# Patient Record
Sex: MALE | Race: WHITE | NOT HISPANIC OR LATINO | Employment: FULL TIME | URBAN - METROPOLITAN AREA
[De-identification: names, ages, dates, MRNs, and addresses within clinical notes are randomized per-mention and may not be internally consistent; named-entity substitution may affect disease eponyms.]

---

## 2017-01-19 ENCOUNTER — OFFICE VISIT (OUTPATIENT)
Dept: SPORTS MEDICINE | Facility: CLINIC | Age: 57
End: 2017-01-19

## 2017-01-19 VITALS
RESPIRATION RATE: 16 BRPM | WEIGHT: 236.7 LBS | SYSTOLIC BLOOD PRESSURE: 124 MMHG | BODY MASS INDEX: 32.06 KG/M2 | HEART RATE: 80 BPM | HEIGHT: 72 IN | DIASTOLIC BLOOD PRESSURE: 80 MMHG | OXYGEN SATURATION: 95 %

## 2017-01-19 DIAGNOSIS — I82.431 DEEP VEIN THROMBOSIS (DVT) OF POPLITEAL VEIN OF RIGHT LOWER EXTREMITY, UNSPECIFIED CHRONICITY (HCC): ICD-10-CM

## 2017-01-19 DIAGNOSIS — R06.83 SNORING: ICD-10-CM

## 2017-01-19 DIAGNOSIS — R10.11 RUQ ABDOMINAL PAIN: Primary | ICD-10-CM

## 2017-01-19 PROCEDURE — 99213 OFFICE O/P EST LOW 20 MIN: CPT | Performed by: FAMILY MEDICINE

## 2017-01-19 NOTE — MR AVS SNAPSHOT
Ck Arreguin   2017 11:40 AM   Office Visit    Dept Phone:  [phone]   Encounter #:  [id number]    Provider:  Eduard Bee MD   Department:  [facility]                Your Full Care Plan              Today's Medication Changes          These changes are accurate as of: 17 12:07 PM.  If you have any questions, ask your nurse or doctor.               Stop taking medication(s)listed here:     rivaroxaban 20 MG tablet   Commonly known as:  XARELTO   Stopped by:  Eduard Bee MD                      Your Updated Medication List      Notice  As of 2017 12:07 PM    You have not been prescribed any medications.            You Were Diagnosed With        Codes Comments    RUQ abdominal pain    -  Primary ICD-10-CM: R10.11  ICD-9-CM: 789.01       Instructions     None    Patient Instructions History      Upcoming Appointments     Visit Type Date Time Department    OFFICE VISIT 2017 11:40 AM [facility]      Mobilligy Signup     [facility] Mob[employer] allows you to send messages to your doctor, view your test results, renew your prescriptions, schedule appointments, and more. To sign up, go to [employer] and click on the Sign Up Now link in the New User? box. Enter your Mobilligy Activation Code exactly as it appears below along with the last four digits of your Social Security Number and your Date of Birth () to complete the sign-up process. If you do not sign up before the expiration date, you must request a new code.    Mobilligy Activation Code: [email or web address]  Expires: 2017 12:07 PM    If you have questions, you can email [email or web address] or call [phone] to talk to our Mobillig[employer] staff. Remember, Mobilligy is NOT to be used for urgent needs. For medical emergencies, dial 911.               Other Info from Your Visit           Allergies     No Known Allergies       "  Reason for Visit     Follow-up follow up from DVT last year      Vital Signs     Blood Pressure Pulse Respirations Height Weight Oxygen Saturation    124/80 (BP Location: Right arm, Patient Position: Sitting, Cuff Size: Adult) 80 16 72\" (182.9 cm) 236 lb 11.2 oz (107 kg) 95%    Body Mass Index Smoking Status                32.1 kg/m2 Current Some Day Smoker          Problems and Diagnoses Noted     Abdominal pain, right upper quadrant    -  Primary        "

## 2017-01-19 NOTE — PROGRESS NOTES
"Subjective   Ck Arreguin is a 56 y.o. male.     History of Present Illness   1.  R popliteal vein DVT, finished 3 months of Xarelto, He was unable to get the repeat doppler done due to work schedule but he is having no pain or swelling.   2.  4 years ago after eating Mexican food and \"lots of chips\"  patient had fairly significant right upper quadrant pain.  Also an episode of emesis.  A few weeks ago [place] patient had similar circumstances and had right upper quadrant pain with emesis.  Also had noticed some abdominal bloating.  After a period of about 12 hours symptoms resolved and he is no longer had any other episodes.  No fever or chills.  No change in color of his urine or stool.  No melena or hematochezia.  3.  Urologist found \"a hernia\" on his last exam.  He was told to observe for now.  It sounds as if it were an indirect hernia.  4.  Wife has noted episodes of snoring and possible apnea.  Patient is in the process of losing weight.  No daytime somnolence.  No trouble with drowsiness during driving.    I have reviewed the patient's medical history in detail and updated the computerized patient record.        Review of Systems   Constitutional: Negative.    HENT: Negative.    Respiratory: Negative.         Per history of present illness    Cardiovascular: Negative.    Gastrointestinal:        Per history of present illness     Visit Vitals   • /80 (BP Location: Right arm, Patient Position: Sitting, Cuff Size: Adult)   • Pulse 80   • Resp 16   • Ht 72\" (182.9 cm)   • Wt 236 lb 11.2 oz (107 kg)   • SpO2 95%   • BMI 32.1 kg/m2         Objective   Physical Exam   Constitutional: He is oriented to person, place, and time. He appears well-developed and well-nourished.   HENT:   Head: Normocephalic and atraumatic.   Eyes: Conjunctivae and EOM are normal. Pupils are equal, round, and reactive to light.   Cardiovascular: Normal rate and regular rhythm.    No peripheral edema   Pulmonary/Chest: Effort " normal and breath sounds normal.   Abdominal: Soft. Bowel sounds are normal.   Musculoskeletal:   Right lower extremity normal in general appearance, no edema, no cords, negative Hever.   Neurological: He is alert and oriented to person, place, and time.   Skin: Skin is warm and dry.   Psychiatric: He has a normal mood and affect. His behavior is normal.   Vitals reviewed.      Assessment/Plan   Ck was seen today for follow-up.    Diagnoses and all orders for this visit:    RUQ abdominal pain  -     US gallbladder; Future    Snoring    Deep vein thrombosis (DVT) of popliteal vein of right lower extremity, unspecified chronicity      1.  Right upper quadrant pain, sounds that this could be gallstone related, we'll check ultrasound and if it is clear or if there is just one large stone that we can observe.  If there are multiple stones then would consider general surgery evaluation.  Patient given warning signs of cholecystitis and these occur he'll go straight to the emergency room.  2.  Right popliteal vein DVT, clinically resolved, at this point I don't think a repeat Doppler is necessary we'll just observe.  3.  Snoring with possible witnessed apnea, recommend sleep study but patient prefers to see if it improves with weight loss, if it does not then he'll let me know.

## 2017-01-30 ENCOUNTER — HOSPITAL ENCOUNTER (OUTPATIENT)
Dept: ULTRASOUND IMAGING | Facility: HOSPITAL | Age: 57
Discharge: HOME | End: 2017-01-30
Admitting: FAMILY MEDICINE

## 2017-01-30 DIAGNOSIS — R10.11 RUQ ABDOMINAL PAIN: ICD-10-CM

## 2017-01-30 PROCEDURE — 76705 ECHO EXAM OF ABDOMEN: CPT

## 2017-02-08 ENCOUNTER — TELEPHONE (OUTPATIENT)
Dept: SPORTS MEDICINE | Facility: CLINIC | Age: 57
End: 2017-02-08

## 2017-02-08 NOTE — TELEPHONE ENCOUNTER
----- Message from Anjana Cross sent at 2/1/2017  3:10 PM EST -----      ----- Message -----     From: Eduard Bee MD     Sent: 2/1/2017   9:37 AM       To: Anjana Cross    Gallbladder US is normal

## 2018-07-23 ENCOUNTER — APPOINTMENT (OUTPATIENT)
Dept: GENERAL RADIOLOGY | Facility: HOSPITAL | Age: 58
End: 2018-07-23

## 2018-07-23 ENCOUNTER — HOSPITAL ENCOUNTER (OUTPATIENT)
Dept: CARDIOLOGY | Facility: HOSPITAL | Age: 58
Discharge: HOME | End: 2018-07-23
Admitting: FAMILY MEDICINE

## 2018-07-23 DIAGNOSIS — Z86.718 HISTORY OF DVT (DEEP VEIN THROMBOSIS): ICD-10-CM

## 2018-07-23 DIAGNOSIS — M79.89 CALF SWELLING: Primary | ICD-10-CM

## 2018-07-23 LAB
BH CV LOW VAS RIGHT GASTRONEMIUS VESSEL: 1
BH CV LOWER VASCULAR LEFT COMMON FEMORAL AUGMENT: NORMAL
BH CV LOWER VASCULAR LEFT COMMON FEMORAL COMPETENT: NORMAL
BH CV LOWER VASCULAR LEFT COMMON FEMORAL COMPRESS: NORMAL
BH CV LOWER VASCULAR LEFT COMMON FEMORAL PHASIC: NORMAL
BH CV LOWER VASCULAR LEFT COMMON FEMORAL SPONT: NORMAL
BH CV LOWER VASCULAR RIGHT COMMON FEMORAL AUGMENT: NORMAL
BH CV LOWER VASCULAR RIGHT COMMON FEMORAL COMPETENT: NORMAL
BH CV LOWER VASCULAR RIGHT COMMON FEMORAL COMPRESS: NORMAL
BH CV LOWER VASCULAR RIGHT COMMON FEMORAL PHASIC: NORMAL
BH CV LOWER VASCULAR RIGHT COMMON FEMORAL SPONT: NORMAL
BH CV LOWER VASCULAR RIGHT DISTAL FEMORAL COMPRESS: NORMAL
BH CV LOWER VASCULAR RIGHT GASTRONEMIUS COMPRESS: NORMAL
BH CV LOWER VASCULAR RIGHT GASTRONEMIUS THROMBUS: NORMAL
BH CV LOWER VASCULAR RIGHT GREATER SAPH AK COMPRESS: NORMAL
BH CV LOWER VASCULAR RIGHT GREATER SAPH BK COMPRESS: NORMAL
BH CV LOWER VASCULAR RIGHT LESSER SAPH COMPRESS: NORMAL
BH CV LOWER VASCULAR RIGHT MID FEMORAL AUGMENT: NORMAL
BH CV LOWER VASCULAR RIGHT MID FEMORAL COMPETENT: NORMAL
BH CV LOWER VASCULAR RIGHT MID FEMORAL COMPRESS: NORMAL
BH CV LOWER VASCULAR RIGHT MID FEMORAL PHASIC: NORMAL
BH CV LOWER VASCULAR RIGHT MID FEMORAL SPONT: NORMAL
BH CV LOWER VASCULAR RIGHT PERONEAL COMPRESS: NORMAL
BH CV LOWER VASCULAR RIGHT POPLITEAL AUGMENT: NORMAL
BH CV LOWER VASCULAR RIGHT POPLITEAL COMPETENT: NORMAL
BH CV LOWER VASCULAR RIGHT POPLITEAL COMPRESS: NORMAL
BH CV LOWER VASCULAR RIGHT POPLITEAL PHASIC: NORMAL
BH CV LOWER VASCULAR RIGHT POPLITEAL SPONT: NORMAL
BH CV LOWER VASCULAR RIGHT POSTERIOR TIBIAL COMPRESS: NORMAL
BH CV LOWER VASCULAR RIGHT PROXIMAL FEMORAL COMPRESS: NORMAL
BH CV LOWER VASCULAR RIGHT SAPHENOFEMORAL JUNCTION AUGMENT: NORMAL
BH CV LOWER VASCULAR RIGHT SAPHENOFEMORAL JUNCTION COMPETENT: NORMAL
BH CV LOWER VASCULAR RIGHT SAPHENOFEMORAL JUNCTION COMPRESS: NORMAL
BH CV LOWER VASCULAR RIGHT SAPHENOFEMORAL JUNCTION PHASIC: NORMAL
BH CV LOWER VASCULAR RIGHT SAPHENOFEMORAL JUNCTION SPONT: NORMAL

## 2018-07-23 PROCEDURE — 73630 X-RAY EXAM OF FOOT: CPT | Performed by: GENERAL PRACTICE

## 2018-07-23 PROCEDURE — 93971 EXTREMITY STUDY: CPT

## 2018-07-23 NOTE — PROGRESS NOTES
RLE Venous doppler study complete. Preliminary positive for chronic calf DVT called to dr. Bee cell phone and message left with results. Patient released

## 2018-07-24 ENCOUNTER — TELEPHONE (OUTPATIENT)
Dept: SPORTS MEDICINE | Facility: CLINIC | Age: 58
End: 2018-07-24

## 2018-07-24 NOTE — TELEPHONE ENCOUNTER
PT NOTIFIED OF RESULTS.  ----- Message from Eduard Bee MD sent at 7/24/2018  8:20 AM EDT -----  The ultrasound shows no acute blood clots.

## 2018-09-26 ENCOUNTER — HOSPITAL ENCOUNTER (OUTPATIENT)
Dept: CARDIOLOGY | Facility: HOSPITAL | Age: 58
Discharge: HOME | End: 2018-09-26
Admitting: FAMILY MEDICINE

## 2018-09-26 ENCOUNTER — OFFICE VISIT (OUTPATIENT)
Dept: SPORTS MEDICINE | Facility: CLINIC | Age: 58
End: 2018-09-26

## 2018-09-26 VITALS
WEIGHT: 231 LBS | DIASTOLIC BLOOD PRESSURE: 60 MMHG | BODY MASS INDEX: 31.29 KG/M2 | HEIGHT: 72 IN | SYSTOLIC BLOOD PRESSURE: 114 MMHG

## 2018-09-26 DIAGNOSIS — M79.662 PAIN OF LEFT CALF: Primary | ICD-10-CM

## 2018-09-26 DIAGNOSIS — Z86.718 HISTORY OF DEEP VENOUS THROMBOSIS (DVT) OF DISTAL VEIN OF RIGHT LOWER EXTREMITY: ICD-10-CM

## 2018-09-26 DIAGNOSIS — Z86.718 HISTORY OF DVT (DEEP VEIN THROMBOSIS): Primary | ICD-10-CM

## 2018-09-26 DIAGNOSIS — M79.662 PAIN OF LEFT CALF: ICD-10-CM

## 2018-09-26 DIAGNOSIS — Z86.718 HISTORY OF DVT (DEEP VEIN THROMBOSIS): ICD-10-CM

## 2018-09-26 LAB
BH CV LOW VAS LEFT GASTRONEMIUS VESSEL: 1
BH CV LOW VAS LEFT PERONEAL VESSEL: 1
BH CV LOW VAS LEFT POSTERIOR TIBIAL VESSEL: 1
BH CV LOWER VASCULAR LEFT COMMON FEMORAL AUGMENT: NORMAL
BH CV LOWER VASCULAR LEFT COMMON FEMORAL COMPETENT: NORMAL
BH CV LOWER VASCULAR LEFT COMMON FEMORAL COMPRESS: NORMAL
BH CV LOWER VASCULAR LEFT COMMON FEMORAL PHASIC: NORMAL
BH CV LOWER VASCULAR LEFT COMMON FEMORAL SPONT: NORMAL
BH CV LOWER VASCULAR LEFT DISTAL FEMORAL COMPRESS: NORMAL
BH CV LOWER VASCULAR LEFT GASTRONEMIUS COMPRESS: NORMAL
BH CV LOWER VASCULAR LEFT GASTRONEMIUS THROMBUS: NORMAL
BH CV LOWER VASCULAR LEFT GREATER SAPH AK COMPRESS: NORMAL
BH CV LOWER VASCULAR LEFT GREATER SAPH BK COMPRESS: NORMAL
BH CV LOWER VASCULAR LEFT LESSER SAPH COMPRESS: NORMAL
BH CV LOWER VASCULAR LEFT MID FEMORAL AUGMENT: NORMAL
BH CV LOWER VASCULAR LEFT MID FEMORAL COMPETENT: NORMAL
BH CV LOWER VASCULAR LEFT MID FEMORAL COMPRESS: NORMAL
BH CV LOWER VASCULAR LEFT MID FEMORAL PHASIC: NORMAL
BH CV LOWER VASCULAR LEFT MID FEMORAL SPONT: NORMAL
BH CV LOWER VASCULAR LEFT PERONEAL COMPRESS: NORMAL
BH CV LOWER VASCULAR LEFT PERONEAL THROMBUS: NORMAL
BH CV LOWER VASCULAR LEFT POPLITEAL AUGMENT: NORMAL
BH CV LOWER VASCULAR LEFT POPLITEAL COMPETENT: NORMAL
BH CV LOWER VASCULAR LEFT POPLITEAL COMPRESS: NORMAL
BH CV LOWER VASCULAR LEFT POPLITEAL PHASIC: NORMAL
BH CV LOWER VASCULAR LEFT POPLITEAL SPONT: NORMAL
BH CV LOWER VASCULAR LEFT POSTERIOR TIBIAL COMPRESS: NORMAL
BH CV LOWER VASCULAR LEFT POSTERIOR TIBIAL THROMBUS: NORMAL
BH CV LOWER VASCULAR LEFT PROXIMAL FEMORAL COMPRESS: NORMAL
BH CV LOWER VASCULAR LEFT SAPHENOFEMORAL JUNCTION AUGMENT: NORMAL
BH CV LOWER VASCULAR LEFT SAPHENOFEMORAL JUNCTION COMPETENT: NORMAL
BH CV LOWER VASCULAR LEFT SAPHENOFEMORAL JUNCTION COMPRESS: NORMAL
BH CV LOWER VASCULAR LEFT SAPHENOFEMORAL JUNCTION PHASIC: NORMAL
BH CV LOWER VASCULAR LEFT SAPHENOFEMORAL JUNCTION SPONT: NORMAL
BH CV LOWER VASCULAR RIGHT COMMON FEMORAL AUGMENT: NORMAL
BH CV LOWER VASCULAR RIGHT COMMON FEMORAL COMPETENT: NORMAL
BH CV LOWER VASCULAR RIGHT COMMON FEMORAL COMPRESS: NORMAL
BH CV LOWER VASCULAR RIGHT COMMON FEMORAL PHASIC: NORMAL
BH CV LOWER VASCULAR RIGHT COMMON FEMORAL SPONT: NORMAL

## 2018-09-26 PROCEDURE — 99214 OFFICE O/P EST MOD 30 MIN: CPT | Performed by: FAMILY MEDICINE

## 2018-09-26 PROCEDURE — 93971 EXTREMITY STUDY: CPT

## 2018-09-26 RX ORDER — TADALAFIL 20 MG
TABLET ORAL
COMMUNITY
Start: 2018-06-28 | End: 2022-07-25 | Stop reason: SDUPTHER

## 2018-09-26 NOTE — PROGRESS NOTES
"Ck is a 57 y.o. year old male    Chief Complaint   Patient presents with   • Leg Pain     (L) calf // x 2 Weeks Ago        History of Present Illness   HPI   Patient with a history of popliteal vein DVT on the right in January 2017.  2 weeks ago patient began to note pain in the left calf, originally is centered in the lower calf.  He has also begun to note some edema around the ankles at the end of a workday.  Pain in the calf is a little worse with dorsiflexion of the ankle and walking.  No known trauma.  Patient has taken ibuprofen for this discomfort and has been helpful.  Patient leaves town tomorrow at noon for business trip.  No chest pain or shortness of breath.  No hemoptysis.  I have reviewed the patient's medical, family, and social history in detail and updated the computerized patient record.    Review of Systems   Constitutional: Negative for fever.   Cardiovascular: Positive for leg swelling.   Skin: Negative for wound.   Neurological: Negative for numbness.   All other systems reviewed and are negative.      /60   Ht 182.9 cm (72.01\")   Wt 105 kg (231 lb)   BMI 31.32 kg/m²      Physical Exam   Constitutional: He is oriented to person, place, and time. He appears well-developed and well-nourished.   HENT:   Head: Normocephalic and atraumatic.   Eyes: Pupils are equal, round, and reactive to light. Conjunctivae and EOM are normal.   Cardiovascular: Normal rate, regular rhythm and normal heart sounds.    Pulmonary/Chest: Effort normal.   Musculoskeletal:   Left calf with mild tenderness to palpation in the center of the lower calf.  Positive Hever.  No pain with resisted plantar flexion.  Patient does have trace of pitting edema lower extremity.   Neurological: He is alert and oriented to person, place, and time.   Skin: Skin is warm and dry.   Psychiatric: He has a normal mood and affect. His behavior is normal.   Vitals reviewed.       Diagnoses and all orders for this visit:    Pain of " left calf    History of deep venous thrombosis (DVT) of distal vein of right lower extremity    Other orders  -     CIALIS 20 MG tablet;        Patient has been set up for venous Doppler left lower extremity, I do not know what time it is as of yet because he has to get insurance approval was that process has been started.  Because of the timing and him leaving town, past history, physical findings I think it would be prudent to start on Xarelto, I have given him a starter pack, we can always discontinue this if the Doppler is negative and then at that point would treat with anti-inflammatories.  Also if this is proven to be a DVT then would refer to hematology for hypercoagulable workup secondary to what would be his second unprovoked DVT.      EMR Dragon/Transcription disclaimer:    Much of this encounter note is an electronic transcription/translation of spoken language to printed text.  The electronic translation of spoken language may permit erroneous, or at times, nonsensical words or phrases to be inadvertently transcribed.  Although I have reviewed the note for such errors some may still exist.

## 2018-09-27 ENCOUNTER — APPOINTMENT (OUTPATIENT)
Dept: CARDIOLOGY | Facility: HOSPITAL | Age: 58
End: 2018-09-27

## 2018-10-04 ENCOUNTER — TELEPHONE (OUTPATIENT)
Dept: SPORTS MEDICINE | Facility: CLINIC | Age: 58
End: 2018-10-04

## 2018-10-04 NOTE — TELEPHONE ENCOUNTER
Patient's wife called with a couple of questions in regards to patient's blood clot.    How often and how much to wear compression socks?    Have a mini-marathon on 10/20/2018 that will be 13 miles. Will be walking and not running. Would patient be able to participate with the blood clot?

## 2018-10-05 NOTE — TELEPHONE ENCOUNTER
I would sugest wear the compression socks during the day    He can participate in the mini-marathon 10/20/2018

## 2018-10-22 ENCOUNTER — OFFICE VISIT (OUTPATIENT)
Dept: SPORTS MEDICINE | Facility: CLINIC | Age: 58
End: 2018-10-22

## 2018-10-22 VITALS
DIASTOLIC BLOOD PRESSURE: 72 MMHG | SYSTOLIC BLOOD PRESSURE: 118 MMHG | TEMPERATURE: 97.4 F | HEART RATE: 91 BPM | WEIGHT: 228 LBS | OXYGEN SATURATION: 99 % | HEIGHT: 72 IN | BODY MASS INDEX: 30.88 KG/M2

## 2018-10-22 DIAGNOSIS — I82.409 RECURRENT DEEP VEIN THROMBOSIS (DVT) (HCC): Primary | ICD-10-CM

## 2018-10-22 PROCEDURE — 99213 OFFICE O/P EST LOW 20 MIN: CPT | Performed by: FAMILY MEDICINE

## 2018-10-22 NOTE — PROGRESS NOTES
"Ck is a 57 y.o. year old male    Chief Complaint   Patient presents with   • Follow-up     [place] [employer]       History of Present Illness   HPI   Patient was diagnosed with thrombus in the left posterior tib, peroneal, gastrocsoleus September 26, 2018.  Patient was started on Xarelto.  Even though this is not into the deep venous system patient has had 2 unprovoked thrombi.  No complaints with Xarelto, no spontaneous bleeding.  No chest pain or shortness of breath.  He has less edema and no pain in the left lower leg at this time.    I have reviewed the patient's medical, family, and social history in detail and updated the computerized patient record.    Review of Systems   Constitutional: Negative.    HENT: Negative.    Respiratory: Negative.    Cardiovascular: Negative.    Gastrointestinal: Negative.    Genitourinary: Negative.    Musculoskeletal: Negative.        /72 (BP Location: Right arm, Patient Position: Sitting, Cuff Size: Large Adult)   Pulse 91   Temp 97.4 °F (36.3 °C) (Oral)   Ht 182.9 cm (72.01\")   Wt 103 kg (228 lb)   SpO2 99%   BMI 30.92 kg/m²      Physical Exam   Constitutional: He is oriented to person, place, and time. He appears well-developed and well-nourished.   HENT:   Head: Normocephalic and atraumatic.   Eyes: Pupils are equal, round, and reactive to light. Conjunctivae and EOM are normal.   Cardiovascular:   No peripheral edema   Pulmonary/Chest: Effort normal.   Musculoskeletal:   Left lower extremity normal in appearance, no edema.  No pain with palpation.  Negative Hever.   Neurological: He is alert and oriented to person, place, and time.   Skin: Skin is warm and dry.   Psychiatric: He has a normal mood and affect. His behavior is normal.   Vitals reviewed.       Diagnoses and all orders for this visit:    Recurrent deep vein thrombosis (DVT) (CMS/[facility])  -     rivaroxaban (XARELTO) 20 MG tablet; Take 1 tablet by mouth Daily.  -     Ambulatory Referral to Hematology     "     Because this is his second episode of unprovoked thrombi, will refer to hematology for further evaluation.    EMR Dragon/Transcription disclaimer:    Much of this encounter note is an electronic transcription/translation of spoken language to printed text.  The electronic translation of spoken language may permit erroneous, or at times, nonsensical words or phrases to be inadvertently transcribed.  Although I have reviewed the note for such errors some may still exist.

## 2018-11-05 ENCOUNTER — LAB (OUTPATIENT)
Dept: OTHER | Facility: HOSPITAL | Age: 58
End: 2018-11-05

## 2018-11-05 ENCOUNTER — CONSULT (OUTPATIENT)
Dept: ONCOLOGY | Facility: CLINIC | Age: 58
End: 2018-11-05

## 2018-11-05 VITALS
DIASTOLIC BLOOD PRESSURE: 77 MMHG | SYSTOLIC BLOOD PRESSURE: 122 MMHG | BODY MASS INDEX: 31.37 KG/M2 | WEIGHT: 231.6 LBS | HEIGHT: 72 IN | RESPIRATION RATE: 16 BRPM | HEART RATE: 67 BPM | TEMPERATURE: 98.4 F | OXYGEN SATURATION: 97 %

## 2018-11-05 DIAGNOSIS — I82.431 DEEP VEIN THROMBOSIS (DVT) OF POPLITEAL VEIN OF RIGHT LOWER EXTREMITY, UNSPECIFIED CHRONICITY (HCC): Primary | ICD-10-CM

## 2018-11-05 LAB
BASOPHILS # BLD AUTO: 0.03 10*3/MM3 (ref 0–0.2)
BASOPHILS NFR BLD AUTO: 0.5 % (ref 0–1.5)
DEPRECATED RDW RBC AUTO: 39.7 FL (ref 37–54)
EOSINOPHIL # BLD AUTO: 0.07 10*3/MM3 (ref 0–0.7)
EOSINOPHIL NFR BLD AUTO: 1.1 % (ref 0.3–6.2)
ERYTHROCYTE [DISTWIDTH] IN BLOOD BY AUTOMATED COUNT: 12.4 % (ref 11.5–14.5)
F5 GENE MUT ANL BLD/T: ABNORMAL
FACTOR II, DNA ANALYSIS: NORMAL
HCT VFR BLD AUTO: 46.1 % (ref 40.4–52.2)
HGB BLD-MCNC: 15.4 G/DL (ref 13.7–17.6)
IMM GRANULOCYTES # BLD: 0.01 10*3/MM3 (ref 0–0.03)
IMM GRANULOCYTES NFR BLD: 0.2 % (ref 0–0.5)
LYMPHOCYTES # BLD AUTO: 1.81 10*3/MM3 (ref 0.9–4.8)
LYMPHOCYTES NFR BLD AUTO: 29.3 % (ref 19.6–45.3)
MCH RBC QN AUTO: 29.2 PG (ref 27–32.7)
MCHC RBC AUTO-ENTMCNC: 33.4 G/DL (ref 32.6–36.4)
MCV RBC AUTO: 87.3 FL (ref 79.8–96.2)
MONOCYTES # BLD AUTO: 0.68 10*3/MM3 (ref 0.2–1.2)
MONOCYTES NFR BLD AUTO: 11 % (ref 5–12)
NEUTROPHILS # BLD AUTO: 3.57 10*3/MM3 (ref 1.9–8.1)
NEUTROPHILS NFR BLD AUTO: 57.9 % (ref 42.7–76)
NRBC BLD MANUAL-RTO: 0 /100 WBC (ref 0–0)
PLATELET # BLD AUTO: 259 10*3/MM3 (ref 140–500)
PMV BLD AUTO: 10.2 FL (ref 6–12)
PSA SERPL-MCNC: <0.014 NG/ML (ref 0–4)
RBC # BLD AUTO: 5.28 10*6/MM3 (ref 4.6–6)
TESTOST SERPL-MCNC: 501.8 NG/DL (ref 193–740)
WBC NRBC COR # BLD: 6.17 10*3/MM3 (ref 4.5–10.7)

## 2018-11-05 PROCEDURE — 86146 BETA-2 GLYCOPROTEIN ANTIBODY: CPT | Performed by: INTERNAL MEDICINE

## 2018-11-05 PROCEDURE — 99244 OFF/OP CNSLTJ NEW/EST MOD 40: CPT | Performed by: INTERNAL MEDICINE

## 2018-11-05 PROCEDURE — 81240 F2 GENE: CPT | Performed by: INTERNAL MEDICINE

## 2018-11-05 PROCEDURE — 85300 ANTITHROMBIN III ACTIVITY: CPT | Performed by: INTERNAL MEDICINE

## 2018-11-05 PROCEDURE — 84403 ASSAY OF TOTAL TESTOSTERONE: CPT | Performed by: INTERNAL MEDICINE

## 2018-11-05 PROCEDURE — 86147 CARDIOLIPIN ANTIBODY EA IG: CPT | Performed by: INTERNAL MEDICINE

## 2018-11-05 PROCEDURE — 36415 COLL VENOUS BLD VENIPUNCTURE: CPT

## 2018-11-05 PROCEDURE — 84153 ASSAY OF PSA TOTAL: CPT | Performed by: INTERNAL MEDICINE

## 2018-11-05 PROCEDURE — 81241 F5 GENE: CPT | Performed by: INTERNAL MEDICINE

## 2018-11-05 PROCEDURE — 85303 CLOT INHIBIT PROT C ACTIVITY: CPT | Performed by: INTERNAL MEDICINE

## 2018-11-05 PROCEDURE — 85732 THROMBOPLASTIN TIME PARTIAL: CPT | Performed by: INTERNAL MEDICINE

## 2018-11-05 PROCEDURE — 85306 CLOT INHIBIT PROT S FREE: CPT | Performed by: INTERNAL MEDICINE

## 2018-11-05 PROCEDURE — 85613 RUSSELL VIPER VENOM DILUTED: CPT | Performed by: INTERNAL MEDICINE

## 2018-11-05 PROCEDURE — 85025 COMPLETE CBC W/AUTO DIFF WBC: CPT | Performed by: INTERNAL MEDICINE

## 2018-11-06 LAB
AT III PPP CHRO-ACNC: 119 % (ref 90–134)
CARDIOLIPIN IGG SER IA-ACNC: <9 GPL U/ML (ref 0–14)
CARDIOLIPIN IGM SER IA-ACNC: <9 MPL U/ML (ref 0–12)
PROT C ACT/NOR PPP: 112 % (ref 86–163)
PROT S ACT/NOR PPP: 78 % (ref 70–127)
PROT S FREE PPP-ACNC: 104 % (ref 49–138)

## 2018-11-07 LAB
LA NT PLATELET PPP: 31.6 SEC (ref 0–51.9)
LUPUS ANTICOAGULANT REFLEX: NORMAL
SCREEN DRVVT: 46.6 SEC (ref 0–47)

## 2018-11-08 LAB
B2 GLYCOPROT1 IGA SER-ACNC: <9 GPI IGA UNITS (ref 0–25)
B2 GLYCOPROT1 IGG SER-ACNC: <9 GPI IGG UNITS (ref 0–20)
B2 GLYCOPROT1 IGM SER-ACNC: <9 GPI IGM UNITS (ref 0–32)

## 2018-11-09 NOTE — PROGRESS NOTES
REASON FOR CONSULTATION:     Recurrent DVT                             REQUESTING PHYSICIAN: Eduard Bee,*     RECORDS OBTAINED:  Records of the patients history including those obtained from the referring provider were reviewed and summarized in detail.    HISTORY OF PRESENT ILLNESS:  The patient is a 57 y.o. year old male who is here for initial review with the above-mentioned history. His thrombotic history dates back to 2016 with an acute right lower extremity deep vein thrombosis noted in the popliteal and gastrocnemius/soleal found at that time. More recently, chronic right lower extremity deep vein thrombosis noted in the gastrocnemius/soleal was found while evaluating swelling.   He is seen today in evaluation of hypercoagulable state.   As he is seen today, he has not taken his Xarelto in a few days and therefore lupus anticoagulant testing is also performed.    Past Medical History:   Diagnosis Date   • Cancer (CMS/HCC)     Prostate   • DVT (deep venous thrombosis) (CMS/[facility]) 08/2016    RLE   • DVT (deep venous thrombosis) (CMS/[facility]) 09/2018    LLE   • History of snoring      Past Surgical History:   Procedure Laterality Date   • PROSTATE SURGERY         HEMATOLOGIC/ONCOLOGIC HISTORY:  (History from previous dates can be found in the separate document.)    MEDICATIONS    Current Outpatient Prescriptions:   •  CIALIS 20 MG tablet, , Disp: , Rfl:   •  rivaroxaban (XARELTO) 20 MG tablet, Take 1 tablet by mouth Daily., Disp: 30 tablet, Rfl: 11    ALLERGIES:   No Known Allergies    SOCIAL HISTORY:       Social History     Social History   • Marital status:      Spouse name: Xi   • Number of children: N/A   • Years of education: N/A     Occupational History   •  [employer]     Social History Main Topics   • Smoking status: Current Some Day Smoker     Types: Cigars   • Smokeless tobacco: Never Used   • Alcohol use Yes      Comment: Weekly    • Drug use: No   • Sexual activity: Defer  "    Other Topics Concern   • Not on file     Social History Narrative   • No narrative on file         FAMILY HISTORY:  Family History   Problem Relation Age of Onset   • No Known Problems Mother    • No Known Problems Father        REVIEW OF SYSTEMS:  Review of Systems           Vitals:    11/05/18 1146   BP: 122/77   Pulse: 67   Resp: 16   Temp: 98.4 °F (36.9 °C)   TempSrc: Oral   SpO2: 97%   Weight: 105 kg (231 lb 9.6 oz)   Height: 183 cm (72.05\")   PainSc: 0-No pain     Current Status 11/5/2018   ECOG score 0      PHYSICAL EXAM:      CONSTITUTIONAL:  Vital signs reviewed.  No distress, looks comfortable.  EYES:  Conjunctiva and lids unremarkable.  PERRLA  EARS,NOSE,MOUTH,THROAT:  Ears and nose appear unremarkable.  Lips, teeth, gums appear unremarkable.  RESPIRATORY:  Normal respiratory effort.  Lungs clear to auscultation bilaterally.  CARDIOVASCULAR:  Normal S1, S2.  No murmurs rubs or gallops.  No significant lower extremity edema.  GASTROINTESTINAL: Abdomen appears unremarkable.  Nontender.  No hepatomegaly.  No splenomegaly.  LYMPHATIC:  No cervical, supraclavicular, axillary lymphadenopathy.  MUSCULOSKELETAL:  Unremarkable gait and station.  Unremarkable digits/nails.  No cyanosis or clubbing.  SKIN:  Warm.  No rashes.  PSYCHIATRIC:  Normal judgment and insight.  Normal mood and affect.      RECENT LABS:        WBC   Date Value Ref Range Status   11/05/2018 6.17 4.50 - 10.70 10*3/mm3 Final   09/09/2016 5.97 4.50 - 10.70 10*3/mm3 Final   09/09/2016 0-5 0 - 5 /hpf Final     Hemoglobin   Date Value Ref Range Status   11/05/2018 15.4 13.7 - 17.6 g/dL Final   09/09/2016 14.5 13.7 - 17.6 g/dL Final     Platelets   Date Value Ref Range Status   11/05/2018 259 140 - 500 10*3/mm3 Final   09/09/2016 265 140 - 500 10*3/mm3 Final       Assessment/Plan   Deep vein thrombosis (DVT) of popliteal vein of right lower extremity, unspecified chronicity (CMS/HCC)  - Duplex Venous Lower Extremity - Bilateral CAR  - Lupus " Anticoagulant Reflex  - Beta-2 Glycoprotein Antibodies  - Antithrombin III  - Protein C Activity  - Protein S Functional  - Protein S Antigen, Free  - PSA DIAGNOSTIC  - Factor 5 Leiden  - Factor II, DNA Analysis  - Anticardiolipin Antibody, IgG / M, Qn  - Testosterone  We will perform thrombophilia testing and resume Xarelto. A repeat Doppler study will confirm his clinical sense of improvement.

## 2018-11-12 ENCOUNTER — HOSPITAL ENCOUNTER (OUTPATIENT)
Dept: CARDIOLOGY | Facility: HOSPITAL | Age: 58
Discharge: HOME | End: 2018-11-12
Attending: INTERNAL MEDICINE | Admitting: INTERNAL MEDICINE

## 2018-11-12 DIAGNOSIS — I82.431 DEEP VEIN THROMBOSIS (DVT) OF POPLITEAL VEIN OF RIGHT LOWER EXTREMITY, UNSPECIFIED CHRONICITY (HCC): ICD-10-CM

## 2018-11-12 LAB
BH CV LOW VAS LEFT GASTRONEMIUS VESSEL: 1
BH CV LOW VAS LEFT PERONEAL VESSEL: 1
BH CV LOW VAS LEFT POSTERIOR TIBIAL VESSEL: 1
BH CV LOW VAS RIGHT GASTRONEMIUS VESSEL: 1
BH CV LOWER VASCULAR LEFT COMMON FEMORAL AUGMENT: NORMAL
BH CV LOWER VASCULAR LEFT COMMON FEMORAL COMPETENT: NORMAL
BH CV LOWER VASCULAR LEFT COMMON FEMORAL COMPRESS: NORMAL
BH CV LOWER VASCULAR LEFT COMMON FEMORAL PHASIC: NORMAL
BH CV LOWER VASCULAR LEFT COMMON FEMORAL SPONT: NORMAL
BH CV LOWER VASCULAR LEFT DISTAL FEMORAL COMPRESS: NORMAL
BH CV LOWER VASCULAR LEFT GASTRONEMIUS COMPRESS: NORMAL
BH CV LOWER VASCULAR LEFT GASTRONEMIUS THROMBUS: NORMAL
BH CV LOWER VASCULAR LEFT GREATER SAPH AK COMPRESS: NORMAL
BH CV LOWER VASCULAR LEFT GREATER SAPH BK COMPRESS: NORMAL
BH CV LOWER VASCULAR LEFT LESSER SAPH COMPRESS: NORMAL
BH CV LOWER VASCULAR LEFT MID FEMORAL AUGMENT: NORMAL
BH CV LOWER VASCULAR LEFT MID FEMORAL COMPETENT: NORMAL
BH CV LOWER VASCULAR LEFT MID FEMORAL COMPRESS: NORMAL
BH CV LOWER VASCULAR LEFT MID FEMORAL PHASIC: NORMAL
BH CV LOWER VASCULAR LEFT MID FEMORAL SPONT: NORMAL
BH CV LOWER VASCULAR LEFT PERONEAL COMPRESS: NORMAL
BH CV LOWER VASCULAR LEFT PERONEAL THROMBUS: NORMAL
BH CV LOWER VASCULAR LEFT POPLITEAL AUGMENT: NORMAL
BH CV LOWER VASCULAR LEFT POPLITEAL COMPETENT: NORMAL
BH CV LOWER VASCULAR LEFT POPLITEAL COMPRESS: NORMAL
BH CV LOWER VASCULAR LEFT POPLITEAL PHASIC: NORMAL
BH CV LOWER VASCULAR LEFT POPLITEAL SPONT: NORMAL
BH CV LOWER VASCULAR LEFT POSTERIOR TIBIAL COMPRESS: NORMAL
BH CV LOWER VASCULAR LEFT POSTERIOR TIBIAL THROMBUS: NORMAL
BH CV LOWER VASCULAR LEFT PROXIMAL FEMORAL COMPRESS: NORMAL
BH CV LOWER VASCULAR LEFT SAPHENOFEMORAL JUNCTION AUGMENT: NORMAL
BH CV LOWER VASCULAR LEFT SAPHENOFEMORAL JUNCTION COMPETENT: NORMAL
BH CV LOWER VASCULAR LEFT SAPHENOFEMORAL JUNCTION COMPRESS: NORMAL
BH CV LOWER VASCULAR LEFT SAPHENOFEMORAL JUNCTION PHASIC: NORMAL
BH CV LOWER VASCULAR LEFT SAPHENOFEMORAL JUNCTION SPONT: NORMAL
BH CV LOWER VASCULAR RIGHT COMMON FEMORAL AUGMENT: NORMAL
BH CV LOWER VASCULAR RIGHT COMMON FEMORAL COMPETENT: NORMAL
BH CV LOWER VASCULAR RIGHT COMMON FEMORAL COMPRESS: NORMAL
BH CV LOWER VASCULAR RIGHT COMMON FEMORAL PHASIC: NORMAL
BH CV LOWER VASCULAR RIGHT COMMON FEMORAL SPONT: NORMAL
BH CV LOWER VASCULAR RIGHT DISTAL FEMORAL COMPRESS: NORMAL
BH CV LOWER VASCULAR RIGHT GASTRONEMIUS COMPRESS: NORMAL
BH CV LOWER VASCULAR RIGHT GASTRONEMIUS THROMBUS: NORMAL
BH CV LOWER VASCULAR RIGHT GREATER SAPH AK COMPRESS: NORMAL
BH CV LOWER VASCULAR RIGHT GREATER SAPH BK COMPRESS: NORMAL
BH CV LOWER VASCULAR RIGHT LESSER SAPH COMPRESS: NORMAL
BH CV LOWER VASCULAR RIGHT MID FEMORAL AUGMENT: NORMAL
BH CV LOWER VASCULAR RIGHT MID FEMORAL COMPETENT: NORMAL
BH CV LOWER VASCULAR RIGHT MID FEMORAL COMPRESS: NORMAL
BH CV LOWER VASCULAR RIGHT MID FEMORAL PHASIC: NORMAL
BH CV LOWER VASCULAR RIGHT MID FEMORAL SPONT: NORMAL
BH CV LOWER VASCULAR RIGHT PERONEAL COMPRESS: NORMAL
BH CV LOWER VASCULAR RIGHT POPLITEAL AUGMENT: NORMAL
BH CV LOWER VASCULAR RIGHT POPLITEAL COMPETENT: NORMAL
BH CV LOWER VASCULAR RIGHT POPLITEAL COMPRESS: NORMAL
BH CV LOWER VASCULAR RIGHT POPLITEAL PHASIC: NORMAL
BH CV LOWER VASCULAR RIGHT POPLITEAL SPONT: NORMAL
BH CV LOWER VASCULAR RIGHT POSTERIOR TIBIAL COMPRESS: NORMAL
BH CV LOWER VASCULAR RIGHT PROXIMAL FEMORAL COMPRESS: NORMAL
BH CV LOWER VASCULAR RIGHT SAPHENOFEMORAL JUNCTION AUGMENT: NORMAL
BH CV LOWER VASCULAR RIGHT SAPHENOFEMORAL JUNCTION COMPETENT: NORMAL
BH CV LOWER VASCULAR RIGHT SAPHENOFEMORAL JUNCTION COMPRESS: NORMAL
BH CV LOWER VASCULAR RIGHT SAPHENOFEMORAL JUNCTION PHASIC: NORMAL
BH CV LOWER VASCULAR RIGHT SAPHENOFEMORAL JUNCTION SPONT: NORMAL

## 2018-11-12 PROCEDURE — 93970 EXTREMITY STUDY: CPT

## 2018-11-26 ENCOUNTER — LAB (OUTPATIENT)
Dept: OTHER | Facility: HOSPITAL | Age: 58
End: 2018-11-26

## 2018-11-26 ENCOUNTER — OFFICE VISIT (OUTPATIENT)
Dept: ONCOLOGY | Facility: CLINIC | Age: 58
End: 2018-11-26

## 2018-11-26 VITALS
HEIGHT: 72 IN | DIASTOLIC BLOOD PRESSURE: 74 MMHG | WEIGHT: 233.8 LBS | TEMPERATURE: 98.2 F | OXYGEN SATURATION: 95 % | HEART RATE: 81 BPM | SYSTOLIC BLOOD PRESSURE: 115 MMHG | RESPIRATION RATE: 16 BRPM | BODY MASS INDEX: 31.67 KG/M2

## 2018-11-26 DIAGNOSIS — D68.51 FACTOR V LEIDEN MUTATION (HCC): Primary | ICD-10-CM

## 2018-11-26 DIAGNOSIS — I82.431 DEEP VEIN THROMBOSIS (DVT) OF POPLITEAL VEIN OF RIGHT LOWER EXTREMITY, UNSPECIFIED CHRONICITY (HCC): Primary | ICD-10-CM

## 2018-11-26 DIAGNOSIS — I82.431 DEEP VEIN THROMBOSIS (DVT) OF POPLITEAL VEIN OF RIGHT LOWER EXTREMITY, UNSPECIFIED CHRONICITY (HCC): ICD-10-CM

## 2018-11-26 LAB
BASOPHILS # BLD AUTO: 0.02 10*3/MM3 (ref 0–0.2)
BASOPHILS NFR BLD AUTO: 0.3 % (ref 0–1.5)
DEPRECATED RDW RBC AUTO: 39.8 FL (ref 37–54)
EOSINOPHIL # BLD AUTO: 0.14 10*3/MM3 (ref 0–0.7)
EOSINOPHIL NFR BLD AUTO: 1.9 % (ref 0.3–6.2)
ERYTHROCYTE [DISTWIDTH] IN BLOOD BY AUTOMATED COUNT: 12.4 % (ref 11.5–14.5)
HCT VFR BLD AUTO: 46.3 % (ref 40.4–52.2)
HGB BLD-MCNC: 15.5 G/DL (ref 13.7–17.6)
IMM GRANULOCYTES # BLD: 0.03 10*3/MM3 (ref 0–0.03)
IMM GRANULOCYTES NFR BLD: 0.4 % (ref 0–0.5)
LYMPHOCYTES # BLD AUTO: 1.99 10*3/MM3 (ref 0.9–4.8)
LYMPHOCYTES NFR BLD AUTO: 26.9 % (ref 19.6–45.3)
MCH RBC QN AUTO: 29.2 PG (ref 27–32.7)
MCHC RBC AUTO-ENTMCNC: 33.5 G/DL (ref 32.6–36.4)
MCV RBC AUTO: 87.2 FL (ref 79.8–96.2)
MONOCYTES # BLD AUTO: 0.73 10*3/MM3 (ref 0.2–1.2)
MONOCYTES NFR BLD AUTO: 9.9 % (ref 5–12)
NEUTROPHILS # BLD AUTO: 4.5 10*3/MM3 (ref 1.9–8.1)
NEUTROPHILS NFR BLD AUTO: 60.6 % (ref 42.7–76)
NRBC BLD MANUAL-RTO: 0 /100 WBC (ref 0–0)
PLATELET # BLD AUTO: 257 10*3/MM3 (ref 140–500)
PMV BLD AUTO: 10.1 FL (ref 6–12)
RBC # BLD AUTO: 5.31 10*6/MM3 (ref 4.6–6)
WBC NRBC COR # BLD: 7.41 10*3/MM3 (ref 4.5–10.7)

## 2018-11-26 PROCEDURE — 36415 COLL VENOUS BLD VENIPUNCTURE: CPT

## 2018-11-26 PROCEDURE — 85025 COMPLETE CBC W/AUTO DIFF WBC: CPT | Performed by: INTERNAL MEDICINE

## 2018-11-26 PROCEDURE — 99213 OFFICE O/P EST LOW 20 MIN: CPT | Performed by: INTERNAL MEDICINE

## 2018-11-26 NOTE — PROGRESS NOTES
REASON FOR CONSULTATION:     Recurrent DVT  Factor V Leiden heterozygosity                             REQUESTING PHYSICIAN: Eduard Bee,*     RECORDS OBTAINED:  Records of the patients history including those obtained from the referring provider were reviewed and summarized in detail.    HISTORY OF PRESENT ILLNESS:  The patient is a 57 y.o. year old male who is here for initial review with the above-mentioned history. His thrombotic history dates back to 2016 with an acute right lower extremity deep vein thrombosis noted in the popliteal and gastrocnemius/soleal found at that time. More recently, chronic right lower extremity deep vein thrombosis noted in the gastrocnemius/soleal was found while evaluating swelling.   He is seen today in evaluation of hypercoagulable state.   As he is seen today, he has not taken his Xarelto in a few days and therefore lupus anticoagulant testing is also performed.    Past Medical History:   Diagnosis Date   • Cancer (CMS/[facility]) 09/2008    Prostate   • DVT (deep venous thrombosis) (CMS/[facility]) 08/2016    RLE   • DVT (deep venous thrombosis) (CMS/[facility]) 09/2018    LLE   • History of foreign travel 02/2018    [place]   • History of snoring      Past Surgical History:   Procedure Laterality Date   • PROSTATE SURGERY  09/2008       HEMATOLOGIC/ONCOLOGIC HISTORY:  (History from previous dates can be found in the separate document.)    MEDICATIONS    Current Outpatient Medications:   •  CIALIS 20 MG tablet, , Disp: , Rfl:   •  rivaroxaban (XARELTO) 20 MG tablet, Take 1 tablet by mouth Daily., Disp: 30 tablet, Rfl: 11    ALLERGIES:   No Known Allergies    SOCIAL HISTORY:       Social History     Socioeconomic History   • Marital status:      Spouse name: Xi   • Number of children: Not on file   • Years of education: College   • Highest education level: Not on file   Social Needs   • Financial resource strain: Not on file   • Food insecurity - worry: Not on file  "  • Food insecurity - inability: Not on file   • Transportation needs - medical: Not on file   • Transportation needs - non-medical: Not on file   Occupational History   • Occupation:      Employer: [employer]   Tobacco Use   • Smoking status: Current Some Day Smoker     Types: Cigars   • Smokeless tobacco: Never Used   Substance and Sexual Activity   • Alcohol use: Yes     Comment: Weekly    • Drug use: No   • Sexual activity: Defer   Other Topics Concern   • Not on file   Social History Narrative   • Not on file         FAMILY HISTORY:  Family History   Problem Relation Age of Onset   • No Known Problems Mother    • No Known Problems Father    • Cholelithiasis Sister        REVIEW OF SYSTEMS:  Review of Systems           Vitals:    11/26/18 0804   BP: 115/74   Pulse: 81   Resp: 16   Temp: 98.2 °F (36.8 °C)   SpO2: 95%   Weight: 106 kg (233 lb 12.8 oz)   Height: 183 cm (72.05\")   PainSc: 0-No pain     Current Status 11/26/2018   ECOG score 0      PHYSICAL EXAM:      CONSTITUTIONAL:  Vital signs reviewed.  No distress, looks comfortable.  EYES:  Conjunctiva and lids unremarkable.  PERRLA  EARS,NOSE,MOUTH,THROAT:  Ears and nose appear unremarkable.  Lips, teeth, gums appear unremarkable.  RESPIRATORY:  Normal respiratory effort.  Lungs clear to auscultation bilaterally.  CARDIOVASCULAR:  Normal S1, S2.  No murmurs rubs or gallops.  No significant lower extremity edema.  GASTROINTESTINAL: Abdomen appears unremarkable.  Nontender.  No hepatomegaly.  No splenomegaly.  LYMPHATIC:  No cervical, supraclavicular, axillary lymphadenopathy.  MUSCULOSKELETAL:  Unremarkable gait and station.  Unremarkable digits/nails.  No cyanosis or clubbing.  SKIN:  Warm.  No rashes.  PSYCHIATRIC:  Normal judgment and insight.  Normal mood and affect.      RECENT LABS:        WBC   Date Value Ref Range Status   11/26/2018 7.41 4.50 - 10.70 10*3/mm3 Final   11/05/2018 6.17 4.50 - 10.70 10*3/mm3 Final   09/09/2016 5.97 4.50 - " 10.70 10*3/mm3 Final   09/09/2016 0-5 0 - 5 /hpf Final     Hemoglobin   Date Value Ref Range Status   11/26/2018 15.5 13.7 - 17.6 g/dL Final   11/05/2018 15.4 13.7 - 17.6 g/dL Final   09/09/2016 14.5 13.7 - 17.6 g/dL Final     Platelets   Date Value Ref Range Status   11/26/2018 257 140 - 500 10*3/mm3 Final   11/05/2018 259 140 - 500 10*3/mm3 Final   09/09/2016 265 140 - 500 10*3/mm3 Final       Assessment/Plan   There are no diagnoses linked to this encounter.We will perform thrombophilia testing and resume Xarelto. A repeat Doppler study will confirm his clinical sense of improvement.                       REASON FOR CONSULTATION:     Recurrent DVT                             REQUESTING PHYSICIAN: Eduard Bee,*     RECORDS OBTAINED:  Records of the patients history including those obtained from the referring provider were reviewed and summarized in detail.    HISTORY OF PRESENT ILLNESS:  The patient is a 57 y.o. year old male who is here for initial review with the above-mentioned history. His thrombotic history dates back to 2016 with an acute right lower extremity deep vein thrombosis noted in the popliteal and gastrocnemius/soleal found at that time. More recently, chronic right lower extremity deep vein thrombosis noted in the gastrocnemius/soleal was found while evaluating swelling.   He is seen today in evaluation of hypercoagulable state.   As he is seen today, he has not taken his Xarelto in a few days and therefore lupus anticoagulant testing is also performed.    Past Medical History:   Diagnosis Date   • Cancer (CMS/[facility]) 09/2008    Prostate   • DVT (deep venous thrombosis) (CMS/[facility]) 08/2016    RLE   • DVT (deep venous thrombosis) (CMS/[facility]) 09/2018    LLE   • History of foreign travel 02/2018    [place]   • History of snoring      Past Surgical History:   Procedure Laterality Date   • PROSTATE SURGERY  09/2008       HEMATOLOGIC/ONCOLOGIC HISTORY:  (History from previous dates can be found  "in the separate document.)    MEDICATIONS    Current Outpatient Medications:   •  CIALIS 20 MG tablet, , Disp: , Rfl:   •  rivaroxaban (XARELTO) 20 MG tablet, Take 1 tablet by mouth Daily., Disp: 30 tablet, Rfl: 11    ALLERGIES:   No Known Allergies    SOCIAL HISTORY:       Social History     Socioeconomic History   • Marital status:      Spouse name: Xi   • Number of children: Not on file   • Years of education: College   • Highest education level: Not on file   Social Needs   • Financial resource strain: Not on file   • Food insecurity - worry: Not on file   • Food insecurity - inability: Not on file   • Transportation needs - medical: Not on file   • Transportation needs - non-medical: Not on file   Occupational History   • Occupation:      Employer: [employer]   Tobacco Use   • Smoking status: Current Some Day Smoker     Types: Cigars   • Smokeless tobacco: Never Used   Substance and Sexual Activity   • Alcohol use: Yes     Comment: Weekly    • Drug use: No   • Sexual activity: Defer   Other Topics Concern   • Not on file   Social History Narrative   • Not on file         FAMILY HISTORY:  Family History   Problem Relation Age of Onset   • No Known Problems Mother    • No Known Problems Father    • Cholelithiasis Sister        REVIEW OF SYSTEMS:  Review of Systems           Vitals:    11/26/18 0804   BP: 115/74   Pulse: 81   Resp: 16   Temp: 98.2 °F (36.8 °C)   SpO2: 95%   Weight: 106 kg (233 lb 12.8 oz)   Height: 183 cm (72.05\")   PainSc: 0-No pain     Current Status 11/26/2018   ECOG score 0      PHYSICAL EXAM:      CONSTITUTIONAL:  Vital signs reviewed.  No distress, looks comfortable.  EYES:  Conjunctiva and lids unremarkable.  PERRLA  EARS,NOSE,MOUTH,THROAT:  Ears and nose appear unremarkable.  Lips, teeth, gums appear unremarkable.  RESPIRATORY:  Normal respiratory effort.  Lungs clear to auscultation bilaterally.  CARDIOVASCULAR:  Normal S1, S2.  No murmurs rubs or gallops.  No " significant lower extremity edema.  GASTROINTESTINAL: Abdomen appears unremarkable.  Nontender.  No hepatomegaly.  No splenomegaly.  LYMPHATIC:  No cervical, supraclavicular, axillary lymphadenopathy.  MUSCULOSKELETAL:  Unremarkable gait and station.  Unremarkable digits/nails.  No cyanosis or clubbing.  SKIN:  Warm.  No rashes.  PSYCHIATRIC:  Normal judgment and insight.  Normal mood and affect.      RECENT LABS:        WBC   Date Value Ref Range Status   11/26/2018 7.41 4.50 - 10.70 10*3/mm3 Final   11/05/2018 6.17 4.50 - 10.70 10*3/mm3 Final   09/09/2016 5.97 4.50 - 10.70 10*3/mm3 Final   09/09/2016 0-5 0 - 5 /hpf Final     Hemoglobin   Date Value Ref Range Status   11/26/2018 15.5 13.7 - 17.6 g/dL Final   11/05/2018 15.4 13.7 - 17.6 g/dL Final   09/09/2016 14.5 13.7 - 17.6 g/dL Final     Platelets   Date Value Ref Range Status   11/26/2018 257 140 - 500 10*3/mm3 Final   11/05/2018 259 140 - 500 10*3/mm3 Final   09/09/2016 265 140 - 500 10*3/mm3 Final       Assessment/Plan   There are no diagnoses linked to this encounter.We will perform thrombophilia testing and resume Xarelto. A repeat Doppler study will confirm his clinical sense of improvement.

## 2019-05-13 ENCOUNTER — APPOINTMENT (OUTPATIENT)
Dept: ONCOLOGY | Facility: CLINIC | Age: 59
End: 2019-05-13

## 2019-05-13 ENCOUNTER — APPOINTMENT (OUTPATIENT)
Dept: OTHER | Facility: HOSPITAL | Age: 59
End: 2019-05-13

## 2019-06-03 ENCOUNTER — LAB (OUTPATIENT)
Dept: OTHER | Facility: HOSPITAL | Age: 59
End: 2019-06-03

## 2019-06-03 ENCOUNTER — OFFICE VISIT (OUTPATIENT)
Dept: ONCOLOGY | Facility: CLINIC | Age: 59
End: 2019-06-03

## 2019-06-03 VITALS
BODY MASS INDEX: 31.45 KG/M2 | DIASTOLIC BLOOD PRESSURE: 81 MMHG | HEIGHT: 72 IN | RESPIRATION RATE: 16 BRPM | OXYGEN SATURATION: 98 % | SYSTOLIC BLOOD PRESSURE: 122 MMHG | HEART RATE: 66 BPM | WEIGHT: 232.2 LBS | TEMPERATURE: 98.2 F

## 2019-06-03 DIAGNOSIS — I82.431 DEEP VEIN THROMBOSIS (DVT) OF POPLITEAL VEIN OF RIGHT LOWER EXTREMITY, UNSPECIFIED CHRONICITY (HCC): Primary | ICD-10-CM

## 2019-06-03 DIAGNOSIS — D68.51 FACTOR V LEIDEN MUTATION (HCC): Primary | ICD-10-CM

## 2019-06-03 DIAGNOSIS — I82.409 RECURRENT DEEP VEIN THROMBOSIS (DVT) (HCC): ICD-10-CM

## 2019-06-03 LAB
BASOPHILS # BLD AUTO: 0.04 10*3/MM3 (ref 0–0.2)
BASOPHILS NFR BLD AUTO: 0.6 % (ref 0–1.5)
DEPRECATED RDW RBC AUTO: 39.8 FL (ref 37–54)
EOSINOPHIL # BLD AUTO: 0.12 10*3/MM3 (ref 0–0.4)
EOSINOPHIL NFR BLD AUTO: 1.8 % (ref 0.3–6.2)
ERYTHROCYTE [DISTWIDTH] IN BLOOD BY AUTOMATED COUNT: 12.3 % (ref 12.3–15.4)
HCT VFR BLD AUTO: 47.6 % (ref 37.5–51)
HGB BLD-MCNC: 15.9 G/DL (ref 13–17.7)
IMM GRANULOCYTES # BLD AUTO: 0.02 10*3/MM3 (ref 0–0.05)
IMM GRANULOCYTES NFR BLD AUTO: 0.3 % (ref 0–0.5)
LYMPHOCYTES # BLD AUTO: 1.93 10*3/MM3 (ref 0.7–3.1)
LYMPHOCYTES NFR BLD AUTO: 28.4 % (ref 19.6–45.3)
MCH RBC QN AUTO: 29.5 PG (ref 26.6–33)
MCHC RBC AUTO-ENTMCNC: 33.4 G/DL (ref 31.5–35.7)
MCV RBC AUTO: 88.3 FL (ref 79–97)
MONOCYTES # BLD AUTO: 0.79 10*3/MM3 (ref 0.1–0.9)
MONOCYTES NFR BLD AUTO: 11.6 % (ref 5–12)
NEUTROPHILS # BLD AUTO: 3.9 10*3/MM3 (ref 1.7–7)
NEUTROPHILS NFR BLD AUTO: 57.3 % (ref 42.7–76)
NRBC BLD AUTO-RTO: 0 /100 WBC (ref 0–0.2)
PLATELET # BLD AUTO: 261 10*3/MM3 (ref 140–450)
PMV BLD AUTO: 10.2 FL (ref 6–12)
RBC # BLD AUTO: 5.39 10*6/MM3 (ref 4.14–5.8)
WBC NRBC COR # BLD: 6.8 10*3/MM3 (ref 3.4–10.8)

## 2019-06-03 PROCEDURE — 85025 COMPLETE CBC W/AUTO DIFF WBC: CPT | Performed by: NURSE PRACTITIONER

## 2019-06-03 PROCEDURE — 36415 COLL VENOUS BLD VENIPUNCTURE: CPT

## 2019-06-03 PROCEDURE — 99213 OFFICE O/P EST LOW 20 MIN: CPT | Performed by: NURSE PRACTITIONER

## 2019-06-03 NOTE — PROGRESS NOTES
Subjective .     REASONS FOR FOLLOWUP:    Recurrent DVT  Factor V Leiden Heterozygosity     HISTORY OF PRESENT ILLNESS:  The patient is a 58 y.o. year old male who is here for follow-up with the above-mentioned history.    History of Present Illness    The patient returns today for six-month follow-up of the above-mentioned history.  He continues on Xarelto, 20 mg daily with no issues.  His thrombotic history dates back to 2016 with an acute right lower extremity DVT in the popliteal and gastrocnemius/soleal regions found.  On repeat Doppler performed in September 2018 there was evidence of a new, unsolicited acute left lower extremity DVT in the posterior tibial, peroneal, and gastrocnemius veins.  Of note, patient was not on anticoagulation at that time.  This was consistent again on Doppler in November 2018.    Complete anticoagulation work-up was performed in November with only pertinent positive being evidence of factor V Leiden heterozygosity.  He denies any precipitating factors for clot propagation.  He does travel quite often and sits for extended periods of time in his car, but otherwise denies any risk factors for clot.    Today, he is feeling well and is trying to stay more active.  He denies any new lower extremity swelling.  No associated chest pain, shortness of breath, or bleeding issues noted.       He has no other concerns today.  Past Medical History:   Diagnosis Date   • Cancer (CMS/[facility]) 09/2008    Prostate   • DVT (deep venous thrombosis) (CMS/[facility]) 08/2016    RLE   • DVT (deep venous thrombosis) (CMS/[facility]) 09/2018    LLE   • History of foreign travel 02/2018    [place]   • History of snoring        ONCOLOGIC HISTORY:  (History from previous dates can be found in the separate document.)    Current Outpatient Medications on File Prior to Visit   Medication Sig Dispense Refill   • CIALIS 20 MG tablet      • rivaroxaban (XARELTO) 20 MG tablet Take 1 tablet by mouth Daily. 30 tablet 11     No  "current facility-administered medications on file prior to visit.        ALLERGIES:   No Known Allergies    Social History     Socioeconomic History   • Marital status:      Spouse name: Xi   • Number of children: Not on file   • Years of education: College   • Highest education level: Not on file   Occupational History   • Occupation:      Employer: [employer]   Tobacco Use   • Smoking status: Current Some Day Smoker     Types: Cigars   • Smokeless tobacco: Never Used   Substance and Sexual Activity   • Alcohol use: Yes     Comment: Weekly    • Drug use: No   • Sexual activity: Defer         Cancer-related family history is not on file.     Review of Systems  A comprehensive 14 point review of systems was performed and was negative except as mentioned.    Objective      Vitals:    06/03/19 0849   BP: 122/81   Pulse: 66   Resp: 16   Temp: 98.2 °F (36.8 °C)   TempSrc: Oral   SpO2: 98%   Weight: 105 kg (232 lb 3.2 oz)   Height: 183 cm (72.05\")   PainSc: 0-No pain     Current Status 6/3/2019   ECOG score 0       Physical Exam   Constitutional: He is oriented to person, place, and time. He appears well-developed and well-nourished.   HENT:   Head: Normocephalic and atraumatic.   Right Ear: External ear normal.   Left Ear: External ear normal.   Nose: Nose normal.   Eyes: EOM are normal. Pupils are equal, round, and reactive to light.   Neck: Normal range of motion. Neck supple.   Cardiovascular: Normal rate, regular rhythm and normal heart sounds.   Pulmonary/Chest: Effort normal and breath sounds normal. No respiratory distress.   Abdominal: Soft. Bowel sounds are normal.   Musculoskeletal: Normal range of motion. He exhibits no edema or tenderness.   Neurological: He is alert and oriented to person, place, and time.   Skin: Skin is warm and dry.   Psychiatric: He has a normal mood and affect. His behavior is normal.           RECENT LABS:  Hematology WBC   Date Value Ref Range Status "   06/03/2019 6.80 3.40 - 10.80 10*3/mm3 Final     RBC   Date Value Ref Range Status   06/03/2019 5.39 4.14 - 5.80 10*6/mm3 Final     Hemoglobin   Date Value Ref Range Status   06/03/2019 15.9 13.0 - 17.7 g/dL Final     Hematocrit   Date Value Ref Range Status   06/03/2019 47.6 37.5 - 51.0 % Final     Platelets   Date Value Ref Range Status   06/03/2019 261 140 - 450 10*3/mm3 Final        Assessment/Plan      The patient is here today for six-month follow-up of recurrent DVT in the setting of factor V Leiden heterozygous mutation.  He continues on anticoagulation with Xarelto, 20 mg daily.  He has been attempting to stay more active, particularly taking more frequent walk breaks with his work travel.    At this time, we will not repeat additional bilateral lower extremity Dopplers unless the patient presents with symptoms (lower extremity swelling, discomfort).  Given his history of recurrent, unprovoked DVT, the patient will likely require lifelong anticoagulation with Xarelto.    We will continue to follow him every 6 months.  The patient knows that he should call our office or go to the emergency room with any sudden lower extremity swelling or pain, shortness of breath, chest pain, or with any bleeding issues.    We did review the possible side effects of Xarelto today including an increased risk of bleeding.  I further explained that there is now a reversal agent for Xarelto should there be any emergency requiring imnmediate reversal of anticoagulation.    I have asked the patient to call our office with any questions or concerns prior to his next office visit.  He has verbalized understanding.    PLAN:  1. MD visit in 6 months for review  2. Call with any concerns prior to this time             Cc:  Eduard Bee*

## 2019-11-12 DIAGNOSIS — I82.409 RECURRENT DEEP VEIN THROMBOSIS (DVT) (HCC): ICD-10-CM

## 2019-11-12 RX ORDER — RIVAROXABAN 20 MG/1
TABLET, FILM COATED ORAL
Qty: 30 TABLET | Refills: 11 | OUTPATIENT
Start: 2019-11-12

## 2019-11-18 ENCOUNTER — LAB (OUTPATIENT)
Dept: OTHER | Facility: HOSPITAL | Age: 59
End: 2019-11-18

## 2019-11-18 ENCOUNTER — OFFICE VISIT (OUTPATIENT)
Dept: ONCOLOGY | Facility: CLINIC | Age: 59
End: 2019-11-18

## 2019-11-18 VITALS
RESPIRATION RATE: 16 BRPM | WEIGHT: 229.7 LBS | OXYGEN SATURATION: 97 % | HEART RATE: 69 BPM | TEMPERATURE: 97.8 F | BODY MASS INDEX: 31.11 KG/M2 | HEIGHT: 72 IN | SYSTOLIC BLOOD PRESSURE: 127 MMHG | DIASTOLIC BLOOD PRESSURE: 73 MMHG

## 2019-11-18 DIAGNOSIS — I82.409 RECURRENT DEEP VEIN THROMBOSIS (DVT) (HCC): ICD-10-CM

## 2019-11-18 DIAGNOSIS — I82.431 DEEP VEIN THROMBOSIS (DVT) OF POPLITEAL VEIN OF RIGHT LOWER EXTREMITY, UNSPECIFIED CHRONICITY (HCC): Primary | ICD-10-CM

## 2019-11-18 DIAGNOSIS — D68.51 FACTOR V LEIDEN MUTATION (HCC): Primary | ICD-10-CM

## 2019-11-18 LAB
BASOPHILS # BLD AUTO: 0.03 10*3/MM3 (ref 0–0.2)
BASOPHILS NFR BLD AUTO: 0.4 % (ref 0–1.5)
DEPRECATED RDW RBC AUTO: 40 FL (ref 37–54)
EOSINOPHIL # BLD AUTO: 0.08 10*3/MM3 (ref 0–0.4)
EOSINOPHIL NFR BLD AUTO: 1.1 % (ref 0.3–6.2)
ERYTHROCYTE [DISTWIDTH] IN BLOOD BY AUTOMATED COUNT: 12.2 % (ref 12.3–15.4)
HCT VFR BLD AUTO: 45.5 % (ref 37.5–51)
HGB BLD-MCNC: 15.1 G/DL (ref 13–17.7)
IMM GRANULOCYTES # BLD AUTO: 0.01 10*3/MM3 (ref 0–0.05)
IMM GRANULOCYTES NFR BLD AUTO: 0.1 % (ref 0–0.5)
LYMPHOCYTES # BLD AUTO: 1.43 10*3/MM3 (ref 0.7–3.1)
LYMPHOCYTES NFR BLD AUTO: 20.3 % (ref 19.6–45.3)
MCH RBC QN AUTO: 29.5 PG (ref 26.6–33)
MCHC RBC AUTO-ENTMCNC: 33.2 G/DL (ref 31.5–35.7)
MCV RBC AUTO: 88.9 FL (ref 79–97)
MONOCYTES # BLD AUTO: 0.73 10*3/MM3 (ref 0.1–0.9)
MONOCYTES NFR BLD AUTO: 10.4 % (ref 5–12)
NEUTROPHILS # BLD AUTO: 4.75 10*3/MM3 (ref 1.7–7)
NEUTROPHILS NFR BLD AUTO: 67.7 % (ref 42.7–76)
NRBC BLD AUTO-RTO: 0 /100 WBC (ref 0–0.2)
PLATELET # BLD AUTO: 234 10*3/MM3 (ref 140–450)
PMV BLD AUTO: 10.7 FL (ref 6–12)
RBC # BLD AUTO: 5.12 10*6/MM3 (ref 4.14–5.8)
WBC NRBC COR # BLD: 7.03 10*3/MM3 (ref 3.4–10.8)

## 2019-11-18 PROCEDURE — 85025 COMPLETE CBC W/AUTO DIFF WBC: CPT | Performed by: INTERNAL MEDICINE

## 2019-11-18 PROCEDURE — 36415 COLL VENOUS BLD VENIPUNCTURE: CPT

## 2019-11-18 PROCEDURE — 99214 OFFICE O/P EST MOD 30 MIN: CPT | Performed by: INTERNAL MEDICINE

## 2019-11-19 NOTE — PROGRESS NOTES
Subjective .     REASONS FOR FOLLOWUP:    1. 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  Patient reports anticoagulation x3 to 4 months.  · Acute left calf DVT 9/26/2018 while off anticoagulation, treated with Xarelto.  Follow-up Doppler 11/12/2018 with chronic right calf DVT, acute left calf DVT  · Hypercoagulable evaluation 11/5/2018 with factor V Leiden heterozygosity, negative prothrombin gene mutation, protein C activity 112%, protein S free 104%, protein S activity 78%, negative anticardiolipin antibody panel, negative beta-2 GP 1 antibody panel, negative lupus anticoagulant.  · Due to unprovoked recurrent DVT in the setting of factor V Leiden heterozygosity, recommended long-term anticoagulation with Xarelto 20 mg daily.    HISTORY OF PRESENT ILLNESS:  The patient is a 58 y.o. year old male who is here for follow-up with the above-mentioned history.    History of Present Illness   patient returns today in follow-up, previously having been followed by Dr. Hilario in our office.  I am seeing the patient today for the first time.  He is continuing on chronic anticoagulation with Xarelto 20 mg daily.  The patient has been doing quite well on this, denies any bleeding issues.  He has no significant chronic lower extremity edema.  He has not experienced any other medical issues recently.    Past Medical History:   Diagnosis Date   • Cancer (CMS/[facility]) 09/2008    Prostate   • DVT (deep venous thrombosis) (CMS/[facility]) 08/2016    RLE   • DVT (deep venous thrombosis) (CMS/[facility]) 09/2018    LLE   • History of foreign travel 02/2018    [place]   • History of snoring      Past Surgical History:   Procedure Laterality Date   • PROSTATE SURGERY  09/2008         Current Outpatient Medications on File Prior to Visit   Medication Sig Dispense Refill   • CIALIS 20 MG tablet      • [DISCONTINUED] rivaroxaban (XARELTO) 20 MG tablet Take 1  tablet by mouth Daily. 30 tablet 11     No current facility-administered medications on file prior to visit.        ALLERGIES:   No Known Allergies    Social History     Socioeconomic History   • Marital status:      Spouse name: Xi   • Number of children: Not on file   • Years of education: College   • Highest education level: Not on file   Occupational History   • Occupation:      Employer: [employer]   Tobacco Use   • Smoking status: Current Some Day Smoker     Types: Cigars   • Smokeless tobacco: Never Used   Substance and Sexual Activity   • Alcohol use: Yes     Comment: Weekly    • Drug use: No   • Sexual activity: Defer     Family History   Problem Relation Age of Onset   • No Known Problems Mother    • No Known Problems Father    • Cholelithiasis Sister            Review of Systems   Constitutional: Negative for activity change, appetite change, fatigue, fever and unexpected weight change.   HENT: Negative for congestion, mouth sores, nosebleeds, sore throat and voice change.    Respiratory: Negative for cough, shortness of breath and wheezing.    Cardiovascular: Negative for chest pain, palpitations and leg swelling.   Gastrointestinal: Negative for abdominal distention, abdominal pain, blood in stool, constipation, diarrhea, nausea and vomiting.   Endocrine: Negative for cold intolerance and heat intolerance.   Genitourinary: Negative for difficulty urinating, dysuria, frequency and hematuria.   Musculoskeletal: Negative for arthralgias, back pain, joint swelling and myalgias.   Skin: Negative for rash.   Neurological: Negative for dizziness, syncope, weakness, light-headedness, numbness and headaches.   Hematological: Negative for adenopathy. Does not bruise/bleed easily.   Psychiatric/Behavioral: Negative for confusion and sleep disturbance. The patient is not nervous/anxious.          Objective      Vitals:    11/18/19 0949   BP: 127/73   Pulse: 69   Resp: 16   Temp: 97.8 °F  (36.6 °C)   SpO2: 97%      Current Status 11/18/2019   ECOG score 0   Pain 0/10    Physical Exam   Constitutional: He is oriented to person, place, and time. He appears well-developed and well-nourished.   HENT:   Mouth/Throat: Oropharynx is clear and moist.   Eyes: Conjunctivae are normal.   Neck: No thyromegaly present.   Cardiovascular: Normal rate and regular rhythm. Exam reveals no gallop and no friction rub.   No murmur heard.  Pulmonary/Chest: Breath sounds normal. No respiratory distress.   Abdominal: Soft. Bowel sounds are normal. He exhibits no distension. There is no tenderness.   Musculoskeletal: He exhibits no edema.   Lymphadenopathy:        Head (right side): No submandibular adenopathy present.     He has no cervical adenopathy.     He has no axillary adenopathy.        Right: No inguinal and no supraclavicular adenopathy present.        Left: No inguinal and no supraclavicular adenopathy present.   Neurological: He is alert and oriented to person, place, and time. He displays normal reflexes. No cranial nerve deficit. He exhibits normal muscle tone.   Skin: Skin is warm and dry. No rash noted.   Psychiatric: He has a normal mood and affect. His behavior is normal.       RECENT LABS:  Hematology WBC   Date Value Ref Range Status   11/18/2019 7.03 3.40 - 10.80 10*3/mm3 Final     RBC   Date Value Ref Range Status   11/18/2019 5.12 4.14 - 5.80 10*6/mm3 Final     Hemoglobin   Date Value Ref Range Status   11/18/2019 15.1 13.0 - 17.7 g/dL Final     Hematocrit   Date Value Ref Range Status   11/18/2019 45.5 37.5 - 51.0 % Final     Platelets   Date Value Ref Range Status   11/18/2019 234 140 - 450 10*3/mm3 Final        Assessment/Plan     1. 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  Patient reports anticoagulation x3 to 4 months.  · Acute left calf DVT 9/26/2018 while off anticoagulation, treated with Xarelto.   Follow-up Doppler 11/12/2018 with chronic right calf DVT, acute left calf DVT  · Hypercoagulable evaluation 11/5/2018 with factor V Leiden heterozygosity, negative prothrombin gene mutation, protein C activity 112%, protein S free 104%, protein S activity 78%, negative anticardiolipin antibody panel, negative beta-2 GP 1 antibody panel, negative lupus anticoagulant.  · Due to unprovoked recurrent DVT in the setting of factor V Leiden heterozygosity, recommended long-term anticoagulation with Xarelto 20 mg daily.  · The patient returns today for a six-month follow-up visit.  He has done well in the interim continuing on Xarelto 20 mg daily with no bleeding issues.  He has no chronic lower extremity edema.  We will continue on with treatment as planned.  He will return here in 6 months for follow-up with CBC, CMP.      Plan:  1. Continue long-term anticoagulation with Xarelto 20 mg daily  2. MD visit in 6 months with CBC, CMP.      The patient and all the above issues were new to me today.  I reviewed multiple records including progress notes, laboratory studies, radiographic studies including Doppler results as outlined above.

## 2020-05-11 ENCOUNTER — APPOINTMENT (OUTPATIENT)
Dept: OTHER | Facility: HOSPITAL | Age: 60
End: 2020-05-11

## 2020-06-22 ENCOUNTER — OFFICE VISIT (OUTPATIENT)
Dept: ONCOLOGY | Facility: CLINIC | Age: 60
End: 2020-06-22

## 2020-06-22 ENCOUNTER — LAB (OUTPATIENT)
Dept: OTHER | Facility: HOSPITAL | Age: 60
End: 2020-06-22

## 2020-06-22 VITALS
WEIGHT: 224.6 LBS | RESPIRATION RATE: 16 BRPM | BODY MASS INDEX: 30.42 KG/M2 | SYSTOLIC BLOOD PRESSURE: 133 MMHG | DIASTOLIC BLOOD PRESSURE: 82 MMHG | TEMPERATURE: 97.5 F | HEIGHT: 72 IN | HEART RATE: 72 BPM | OXYGEN SATURATION: 97 %

## 2020-06-22 DIAGNOSIS — I82.409 RECURRENT DEEP VEIN THROMBOSIS (DVT) (HCC): ICD-10-CM

## 2020-06-22 DIAGNOSIS — D68.51 FACTOR V LEIDEN MUTATION (HCC): ICD-10-CM

## 2020-06-22 DIAGNOSIS — D68.51 FACTOR V LEIDEN MUTATION (HCC): Primary | ICD-10-CM

## 2020-06-22 LAB
ALBUMIN SERPL-MCNC: 4.5 G/DL (ref 3.5–5.2)
ALBUMIN/GLOB SERPL: 1.7 G/DL
ALP SERPL-CCNC: 63 U/L (ref 39–117)
ALT SERPL W P-5'-P-CCNC: 16 U/L (ref 1–41)
ANION GAP SERPL CALCULATED.3IONS-SCNC: 10.3 MMOL/L (ref 5–15)
AST SERPL-CCNC: 22 U/L (ref 1–40)
BASOPHILS # BLD AUTO: 0.03 10*3/MM3 (ref 0–0.2)
BASOPHILS NFR BLD AUTO: 0.4 % (ref 0–1.5)
BILIRUB SERPL-MCNC: 0.4 MG/DL (ref 0.1–1.2)
BUN BLD-MCNC: 22 MG/DL (ref 6–20)
BUN/CREAT SERPL: 19 (ref 7–25)
CALCIUM SPEC-SCNC: 9.3 MG/DL (ref 8.6–10.5)
CHLORIDE SERPL-SCNC: 102 MMOL/L (ref 98–107)
CO2 SERPL-SCNC: 25.7 MMOL/L (ref 22–29)
CREAT BLD-MCNC: 1.16 MG/DL (ref 0.76–1.27)
DEPRECATED RDW RBC AUTO: 39.9 FL (ref 37–54)
EOSINOPHIL # BLD AUTO: 0.08 10*3/MM3 (ref 0–0.4)
EOSINOPHIL NFR BLD AUTO: 1.1 % (ref 0.3–6.2)
ERYTHROCYTE [DISTWIDTH] IN BLOOD BY AUTOMATED COUNT: 12.2 % (ref 12.3–15.4)
GFR SERPL CREATININE-BSD FRML MDRD: 65 ML/MIN/1.73
GLOBULIN UR ELPH-MCNC: 2.6 GM/DL
GLUCOSE BLD-MCNC: 124 MG/DL (ref 65–99)
HCT VFR BLD AUTO: 45.2 % (ref 37.5–51)
HGB BLD-MCNC: 15 G/DL (ref 13–17.7)
IMM GRANULOCYTES # BLD AUTO: 0.02 10*3/MM3 (ref 0–0.05)
IMM GRANULOCYTES NFR BLD AUTO: 0.3 % (ref 0–0.5)
LYMPHOCYTES # BLD AUTO: 2.07 10*3/MM3 (ref 0.7–3.1)
LYMPHOCYTES NFR BLD AUTO: 29.2 % (ref 19.6–45.3)
MCH RBC QN AUTO: 29.9 PG (ref 26.6–33)
MCHC RBC AUTO-ENTMCNC: 33.2 G/DL (ref 31.5–35.7)
MCV RBC AUTO: 90 FL (ref 79–97)
MONOCYTES # BLD AUTO: 0.67 10*3/MM3 (ref 0.1–0.9)
MONOCYTES NFR BLD AUTO: 9.4 % (ref 5–12)
NEUTROPHILS # BLD AUTO: 4.22 10*3/MM3 (ref 1.7–7)
NEUTROPHILS NFR BLD AUTO: 59.6 % (ref 42.7–76)
NRBC BLD AUTO-RTO: 0 /100 WBC (ref 0–0.2)
PLATELET # BLD AUTO: 261 10*3/MM3 (ref 140–450)
PMV BLD AUTO: 10.3 FL (ref 6–12)
POTASSIUM BLD-SCNC: 4.1 MMOL/L (ref 3.5–5.2)
PROT SERPL-MCNC: 7.1 G/DL (ref 6–8.5)
RBC # BLD AUTO: 5.02 10*6/MM3 (ref 4.14–5.8)
SODIUM BLD-SCNC: 138 MMOL/L (ref 136–145)
WBC NRBC COR # BLD: 7.09 10*3/MM3 (ref 3.4–10.8)

## 2020-06-22 PROCEDURE — 80053 COMPREHEN METABOLIC PANEL: CPT | Performed by: INTERNAL MEDICINE

## 2020-06-22 PROCEDURE — 85025 COMPLETE CBC W/AUTO DIFF WBC: CPT | Performed by: INTERNAL MEDICINE

## 2020-06-22 PROCEDURE — 99213 OFFICE O/P EST LOW 20 MIN: CPT | Performed by: INTERNAL MEDICINE

## 2020-06-22 PROCEDURE — 36415 COLL VENOUS BLD VENIPUNCTURE: CPT

## 2020-06-22 NOTE — PROGRESS NOTES
Subjective .     REASONS FOR FOLLOWUP:    1. 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  Patient reports anticoagulation x3 to 4 months.  · Acute left calf DVT 9/26/2018 while off anticoagulation, treated with Xarelto.  Follow-up Doppler 11/12/2018 with chronic right calf DVT, acute left calf DVT  · Hypercoagulable evaluation 11/5/2018 with factor V Leiden heterozygosity, negative prothrombin gene mutation, protein C activity 112%, protein S free 104%, protein S activity 78%, negative anticardiolipin antibody panel, negative beta-2 GP 1 antibody panel, negative lupus anticoagulant.  · Due to unprovoked recurrent DVT in the setting of factor V Leiden heterozygosity, recommended long-term anticoagulation with Xarelto 20 mg daily.    HISTORY OF PRESENT ILLNESS:  The patient is a 59 y.o. year old male who is here for follow-up with the above-mentioned history.    History of Present Illness   the patient returns today in follow-up with laboratory studies to review continuing on chronic anticoagulation with Xarelto 20 mg daily.  He reports doing very well on this with no bleeding issues.  He in fact has no significant complaints today.  He did undergo removal of a basal cell carcinoma in the recent past.    Past Medical History:   Diagnosis Date   • Cancer (CMS/HCC) 09/2008    Prostate   • DVT (deep venous thrombosis) (CMS/HCC) 08/2016    RLE   • DVT (deep venous thrombosis) (CMS/HCC) 09/2018    LLE   • History of foreign travel 02/2018    [place]   • History of snoring      Past Surgical History:   Procedure Laterality Date   • PROSTATE SURGERY  09/2008         Current Outpatient Medications on File Prior to Visit   Medication Sig Dispense Refill   • CIALIS 20 MG tablet      • rivaroxaban (XARELTO) 20 MG tablet Take 1 tablet by mouth Daily. 30 tablet 11     No current facility-administered medications on file prior to visit.         ALLERGIES:   No Known Allergies    Social History     Socioeconomic History   • Marital status:      Spouse name: Xi   • Number of children: Not on file   • Years of education: College   • Highest education level: Not on file   Occupational History   • Occupation:      Employer: [employer]   Tobacco Use   • Smoking status: Current Some Day Smoker     Types: Cigars   • Smokeless tobacco: Never Used   Substance and Sexual Activity   • Alcohol use: Yes     Comment: Weekly    • Drug use: No   • Sexual activity: Defer     Family History   Problem Relation Age of Onset   • No Known Problems Mother    • No Known Problems Father    • Cholelithiasis Sister            Review of Systems   Constitutional: Negative for activity change, appetite change, fatigue, fever and unexpected weight change.   HENT: Negative for congestion, mouth sores, nosebleeds, sore throat and voice change.    Respiratory: Negative for cough, shortness of breath and wheezing.    Cardiovascular: Negative for chest pain, palpitations and leg swelling.   Gastrointestinal: Negative for abdominal distention, abdominal pain, blood in stool, constipation, diarrhea, nausea and vomiting.   Endocrine: Negative for cold intolerance and heat intolerance.   Genitourinary: Negative for difficulty urinating, dysuria, frequency and hematuria.   Musculoskeletal: Negative for arthralgias, back pain, joint swelling and myalgias.   Skin: Negative for rash.   Neurological: Negative for dizziness, syncope, weakness, light-headedness, numbness and headaches.   Hematological: Negative for adenopathy. Does not bruise/bleed easily.   Psychiatric/Behavioral: Negative for confusion and sleep disturbance. The patient is not nervous/anxious.          Objective      Vitals:    06/22/20 1528   BP: 133/82   Pulse: 72   Resp: 16   Temp: 97.5 °F (36.4 °C)   SpO2: 97%      Current Status 6/22/2020   ECOG score 0   Pain 0/10    Physical Exam    Constitutional: He is oriented to person, place, and time. He appears well-developed and well-nourished.   HENT:   Mouth/Throat: Oropharynx is clear and moist.   Eyes: Conjunctivae are normal.   Neck: No thyromegaly present.   Cardiovascular: Normal rate and regular rhythm. Exam reveals no gallop and no friction rub.   No murmur heard.  Pulmonary/Chest: Breath sounds normal. No respiratory distress.   Abdominal: Soft. Bowel sounds are normal. He exhibits no distension. There is no tenderness.   Musculoskeletal: He exhibits no edema.   Lymphadenopathy:        Head (right side): No submandibular adenopathy present.     He has no cervical adenopathy.     He has no axillary adenopathy.        Right: No inguinal and no supraclavicular adenopathy present.        Left: No inguinal and no supraclavicular adenopathy present.   Neurological: He is alert and oriented to person, place, and time. He displays normal reflexes. No cranial nerve deficit. He exhibits normal muscle tone.   Skin: Skin is warm and dry. No rash noted.   Psychiatric: He has a normal mood and affect. His behavior is normal.   The patient was examined today, unchanged from above.    RECENT LABS:  Hematology WBC   Date Value Ref Range Status   06/22/2020 7.09 3.40 - 10.80 10*3/mm3 Final     RBC   Date Value Ref Range Status   06/22/2020 5.02 4.14 - 5.80 10*6/mm3 Final     Hemoglobin   Date Value Ref Range Status   06/22/2020 15.0 13.0 - 17.7 g/dL Final     Hematocrit   Date Value Ref Range Status   06/22/2020 45.2 37.5 - 51.0 % Final     Platelets   Date Value Ref Range Status   06/22/2020 261 140 - 450 10*3/mm3 Final        Lab Results   Component Value Date    GLUCOSE 124 (H) 06/22/2020    BUN 22 (H) 06/22/2020    CREATININE 1.16 06/22/2020    EGFRIFNONA 65 06/22/2020    EGFRIFAFRI 92 09/09/2016    BCR 19.0 06/22/2020    K 4.1 06/22/2020    CO2 25.7 06/22/2020    CALCIUM 9.3 06/22/2020    PROTENTOTREF 7.0 09/09/2016    ALBUMIN 4.50 06/22/2020    LABIL2  2.0 09/09/2016    AST 22 06/22/2020    ALT 16 06/22/2020         Assessment/Plan     1. 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  Patient reports anticoagulation x3 to 4 months.  · Acute left calf DVT 9/26/2018 while off anticoagulation, treated with Xarelto.  Follow-up Doppler 11/12/2018 with chronic right calf DVT, acute left calf DVT  · Hypercoagulable evaluation 11/5/2018 with factor V Leiden heterozygosity, negative prothrombin gene mutation, protein C activity 112%, protein S free 104%, protein S activity 78%, negative anticardiolipin antibody panel, negative beta-2 GP 1 antibody panel, negative lupus anticoagulant.  · Due to unprovoked recurrent DVT in the setting of factor V Leiden heterozygosity, recommended long-term anticoagulation with Xarelto 20 mg daily.  · The patient returns today for a six-month follow-up visit.  He has done well in the interim continuing on Xarelto 20 mg daily with no bleeding issues.  He has no chronic lower extremity edema.  We will continue on with treatment as planned.  He will return here in 6 months for follow-up with CBC, CMP.      Plan:  1. Continue long-term anticoagulation with Xarelto 20 mg daily.  2. MD visit in 6 months with CBC, CMP.

## 2020-11-17 DIAGNOSIS — Z00.00 PREVENTATIVE HEALTH CARE: Primary | ICD-10-CM

## 2020-11-17 DIAGNOSIS — Z12.5 SCREENING FOR MALIGNANT NEOPLASM OF PROSTATE: ICD-10-CM

## 2020-11-20 LAB
ALBUMIN SERPL-MCNC: 4.7 G/DL (ref 3.5–5.2)
ALBUMIN/GLOB SERPL: 2.1 G/DL
ALP SERPL-CCNC: 59 U/L (ref 39–117)
ALT SERPL-CCNC: 16 U/L (ref 1–41)
APPEARANCE UR: CLEAR
AST SERPL-CCNC: 19 U/L (ref 1–40)
BACTERIA #/AREA URNS HPF: NORMAL /HPF
BASOPHILS # BLD AUTO: 0.03 10*3/MM3 (ref 0–0.2)
BASOPHILS NFR BLD AUTO: 0.5 % (ref 0–1.5)
BILIRUB SERPL-MCNC: 0.6 MG/DL (ref 0–1.2)
BILIRUB UR QL STRIP: NEGATIVE
BUN SERPL-MCNC: 20 MG/DL (ref 6–20)
BUN/CREAT SERPL: 20.2 (ref 7–25)
CALCIUM SERPL-MCNC: 9 MG/DL (ref 8.6–10.5)
CHLORIDE SERPL-SCNC: 101 MMOL/L (ref 98–107)
CHOLEST SERPL-MCNC: 191 MG/DL (ref 0–200)
CO2 SERPL-SCNC: 27 MMOL/L (ref 22–29)
COLOR UR: YELLOW
CREAT SERPL-MCNC: 0.99 MG/DL (ref 0.76–1.27)
EOSINOPHIL # BLD AUTO: 0.08 10*3/MM3 (ref 0–0.4)
EOSINOPHIL NFR BLD AUTO: 1.4 % (ref 0.3–6.2)
EPI CELLS #/AREA URNS HPF: NORMAL /HPF (ref 0–10)
ERYTHROCYTE [DISTWIDTH] IN BLOOD BY AUTOMATED COUNT: 11.7 % (ref 12.3–15.4)
GLOBULIN SER CALC-MCNC: 2.2 GM/DL
GLUCOSE SERPL-MCNC: 107 MG/DL (ref 65–99)
GLUCOSE UR QL: NEGATIVE
HBA1C MFR BLD: 6.1 % (ref 4.8–5.6)
HCT VFR BLD AUTO: 44.3 % (ref 37.5–51)
HDLC SERPL-MCNC: 53 MG/DL (ref 40–60)
HGB BLD-MCNC: 14.8 G/DL (ref 13–17.7)
HGB UR QL STRIP: NEGATIVE
IMM GRANULOCYTES # BLD AUTO: 0.03 10*3/MM3 (ref 0–0.05)
IMM GRANULOCYTES NFR BLD AUTO: 0.5 % (ref 0–0.5)
KETONES UR QL STRIP: NEGATIVE
LDLC SERPL CALC-MCNC: 127 MG/DL (ref 0–100)
LDLC/HDLC SERPL: 2.38 {RATIO}
LEUKOCYTE ESTERASE UR QL STRIP: NEGATIVE
LYMPHOCYTES # BLD AUTO: 1.4 10*3/MM3 (ref 0.7–3.1)
LYMPHOCYTES NFR BLD AUTO: 23.9 % (ref 19.6–45.3)
MCH RBC QN AUTO: 29 PG (ref 26.6–33)
MCHC RBC AUTO-ENTMCNC: 33.4 G/DL (ref 31.5–35.7)
MCV RBC AUTO: 86.9 FL (ref 79–97)
MICRO URNS: NORMAL
MICRO URNS: NORMAL
MONOCYTES # BLD AUTO: 0.61 10*3/MM3 (ref 0.1–0.9)
MONOCYTES NFR BLD AUTO: 10.4 % (ref 5–12)
MUCOUS THREADS URNS QL MICRO: PRESENT /HPF
NEUTROPHILS # BLD AUTO: 3.71 10*3/MM3 (ref 1.7–7)
NEUTROPHILS NFR BLD AUTO: 63.3 % (ref 42.7–76)
NITRITE UR QL STRIP: NEGATIVE
NRBC BLD AUTO-RTO: 0 /100 WBC (ref 0–0.2)
PH UR STRIP: 5.5 [PH] (ref 5–7.5)
PLATELET # BLD AUTO: 262 10*3/MM3 (ref 140–450)
POTASSIUM SERPL-SCNC: 4.5 MMOL/L (ref 3.5–5.2)
PROT SERPL-MCNC: 6.9 G/DL (ref 6–8.5)
PROT UR QL STRIP: NEGATIVE
PSA SERPL-MCNC: <0.014 NG/ML (ref 0–4)
RBC # BLD AUTO: 5.1 10*6/MM3 (ref 4.14–5.8)
RBC #/AREA URNS HPF: NORMAL /HPF (ref 0–2)
SODIUM SERPL-SCNC: 139 MMOL/L (ref 136–145)
SP GR UR: 1.03 (ref 1–1.03)
TRIGL SERPL-MCNC: 59 MG/DL (ref 0–150)
TSH SERPL DL<=0.005 MIU/L-ACNC: 3.03 UIU/ML (ref 0.27–4.2)
URINALYSIS REFLEX: NORMAL
UROBILINOGEN UR STRIP-MCNC: 0.2 MG/DL (ref 0.2–1)
VLDLC SERPL CALC-MCNC: 11 MG/DL (ref 5–40)
WBC # BLD AUTO: 5.86 10*3/MM3 (ref 3.4–10.8)
WBC #/AREA URNS HPF: NORMAL /HPF (ref 0–5)

## 2020-12-01 ENCOUNTER — OFFICE VISIT (OUTPATIENT)
Dept: SPORTS MEDICINE | Facility: CLINIC | Age: 60
End: 2020-12-01

## 2020-12-01 VITALS
OXYGEN SATURATION: 99 % | HEIGHT: 72 IN | TEMPERATURE: 97.3 F | HEART RATE: 93 BPM | SYSTOLIC BLOOD PRESSURE: 158 MMHG | BODY MASS INDEX: 30.48 KG/M2 | WEIGHT: 225 LBS | RESPIRATION RATE: 16 BRPM | DIASTOLIC BLOOD PRESSURE: 78 MMHG

## 2020-12-01 DIAGNOSIS — M79.662 BILATERAL CALF PAIN: Primary | ICD-10-CM

## 2020-12-01 DIAGNOSIS — G89.29 CHRONIC RUQ PAIN: ICD-10-CM

## 2020-12-01 DIAGNOSIS — R10.11 CHRONIC RUQ PAIN: ICD-10-CM

## 2020-12-01 DIAGNOSIS — N50.89 ENLARGED TESTICLE: ICD-10-CM

## 2020-12-01 DIAGNOSIS — M79.661 BILATERAL CALF PAIN: Primary | ICD-10-CM

## 2020-12-01 PROCEDURE — 99214 OFFICE O/P EST MOD 30 MIN: CPT | Performed by: FAMILY MEDICINE

## 2020-12-01 NOTE — PROGRESS NOTES
"Ck is a 59 y.o. year old male    Chief Complaint   Patient presents with   • Leg Pain     Bilateral leg pain for a few weeks, believes he had a circulaion problem.        History of Present Illness  1.  Bilateral leg pain that began approximately 4 weeks ago.  No known injury.  Does have a hypercoagulability, bilateral DVT in the past.  Has been on Xarelto ever since.  Does not associate swelling and pain to the severity that he had when he had his acute DVTs.  The pain seems to be worse at nighttime.  He states that it has been improving and essentially resolved at this time.  2.  Complains of enlarged right testicle, \"4 times the size of the left\".  Has urologist and pending annual follow-up in January though he will call for sooner appointment.  3.  Chronic right upper quadrant, postprandial pain.  Worse after eating at nighttime and when laying on the side.  \"It is my gallbladder?\"    I have reviewed the patient's medical, family, and social history in detail and updated the computerized patient record.    Review of Systems  Constitutional: Negative for fever.   Musculoskeletal:        Per HPI   Skin: Negative for rash.   Neurological: Negative for weakness and numbness.   Psychiatric/Behavioral: Negative for sleep disturbance.   All other systems reviewed and are negative.    /78 (BP Location: Left arm, Patient Position: Sitting, Cuff Size: Adult)   Pulse 93   Temp 97.3 °F (36.3 °C)   Resp 16   Ht 183 cm (72.05\")   Wt 102 kg (225 lb)   SpO2 99%   BMI 30.47 kg/m²      Physical Exam    Mask worn thru encounter  Vital signs reviewed.   General: No acute distress.  Eyes: conjunctiva clear; pupils equally round and reactive  ENT: external ears atraumatic  CV: no peripheral edema  Resp: normal respiratory effort, no use of accessory muscles  Skin: no rashes or wounds; normal turgor  Psych: mood and affect appropriate; recent and remote memory intact  Neuro: sensation to light touch intact    MSK " Exam:  Bilateral lower extremity demonstrate no gross unreality.  No swelling.  No tenderness along the calf musculature.  Negative Homans' sign bilateral.    Diagnoses and all orders for this visit:    Bilateral calf pain    Enlarged testicle    Chronic RUQ pain  -     US liver; Future      1.  Discussed differential.  Doubt new DVT given that he has been on anticoagulants.  Could be RLS.  2.  He will contact his urologist.  3.  Discussed possible gallbladder etiology.  Ultrasound liver.    I spent greater than 50% of this 25 minute visit discussing the diagnosis, prognosis, treatment plan, etc. Patient's questions were answered in detail with appropriate counseling and anticipatory guidance.       EMR Dragon/Transcription disclaimer:    Much of this encounter note is an electronic transcription/translation of spoken language to printed text.  The electronic translation of spoken language may permit erroneous, or at times, nonsensical words or phrases to be inadvertently transcribed.  Although I have reviewed the note for such errors some may still exist.

## 2020-12-08 NOTE — PROGRESS NOTES
Subjective .     REASONS FOR FOLLOWUP:    1. 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  Patient reports anticoagulation x3 to 4 months.  · Acute left calf DVT 9/26/2018 while off anticoagulation, treated with Xarelto.  Follow-up Doppler 11/12/2018 with chronic right calf DVT, acute left calf DVT  · Hypercoagulable evaluation 11/5/2018 with factor V Leiden heterozygosity, negative prothrombin gene mutation, protein C activity 112%, protein S free 104%, protein S activity 78%, negative anticardiolipin antibody panel, negative beta-2 GP 1 antibody panel, negative lupus anticoagulant.  · Due to unprovoked recurrent DVT in the setting of factor V Leiden heterozygosity, recommended long-term anticoagulation with Xarelto 20 mg daily.    HISTORY OF PRESENT ILLNESS:  The patient is a 59 y.o. year old male who is here for follow-up with the above-mentioned history.    History of Present Illness   the patient returns today in follow-up with laboratory studies to review continuing on chronic anticoagulation with Xarelto 20 mg daily.  The patient has continued to do well on anticoagulation with no bleeding issues.  He he has had a number of other medical issues however.  He did see his primary care physician recently to address these.  He was experiencing some bilateral distal lower extremity pain at night however reports that this is improved.  He did experience some swelling in his right testicle, was seen by urology to have hydrocele.  An ultrasound was performed.  He was also placed on an antibiotic, question epididymitis and has noted improvement in the swelling.  He was experiencing some abdominal pain as well and is scheduled for upcoming right upper quadrant ultrasound later this week.  Pain occurs after eating late at night and is somewhat positional.  He has had some back pain as well and did receive a prednisone taper pack  which has helped.    Past Medical History:   Diagnosis Date   • Cancer (CMS/HCC) 09/2008    Prostate   • DVT (deep venous thrombosis) (CMS/[facility]) 08/2016    RLE   • DVT (deep venous thrombosis) (CMS/[facility]) 09/2018    LLE   • History of foreign travel 02/2018    [place]   • History of snoring      Past Surgical History:   Procedure Laterality Date   • PROSTATE SURGERY  09/2008         Current Outpatient Medications on File Prior to Visit   Medication Sig Dispense Refill   • CIALIS 20 MG tablet      • doxycycline (VIBRAMYCIN) 100 MG capsule Take 100 mg by mouth 2 (Two) Times a Day.     • rivaroxaban (XARELTO) 20 MG tablet Take 1 tablet by mouth Daily. 30 tablet 11   • methylPREDNISolone (MEDROL) 4 MG dose pack Take as directed on package instructions. 21 tablet 0     No current facility-administered medications on file prior to visit.        ALLERGIES:     Allergies   Allergen Reactions   • Bactrim [Sulfamethoxazole-Trimethoprim] Rash       Social History     Socioeconomic History   • Marital status:      Spouse name: Xi   • Number of children: Not on file   • Years of education: College   • Highest education level: Not on file   Occupational History   • Occupation:      Employer: [employer]   Tobacco Use   • Smoking status: Current Some Day Smoker     Types: Cigars   • Smokeless tobacco: Never Used   Substance and Sexual Activity   • Alcohol use: Yes     Comment: Weekly    • Drug use: No   • Sexual activity: Defer     Family History   Problem Relation Age of Onset   • No Known Problems Mother    • No Known Problems Father    • Cholelithiasis Sister            Review of Systems   Constitutional: Negative for activity change, appetite change, fatigue, fever and unexpected weight change.   HENT: Negative for congestion, mouth sores, nosebleeds, sore throat and voice change.    Respiratory: Negative for cough, shortness of breath and wheezing.    Cardiovascular: Negative for chest pain,  palpitations and leg swelling.   Gastrointestinal: Positive for abdominal pain. Negative for abdominal distention, blood in stool, constipation, diarrhea, nausea and vomiting.   Endocrine: Negative for cold intolerance and heat intolerance.   Genitourinary: Positive for scrotal swelling. Negative for difficulty urinating, dysuria, frequency and hematuria.   Musculoskeletal: Positive for arthralgias, back pain and myalgias. Negative for joint swelling.   Skin: Negative for rash.   Neurological: Negative for dizziness, syncope, weakness, light-headedness, numbness and headaches.   Hematological: Negative for adenopathy. Does not bruise/bleed easily.   Psychiatric/Behavioral: Negative for confusion and sleep disturbance. The patient is not nervous/anxious.          Objective      Vitals:    12/14/20 1033   BP: 149/86   Pulse: 87   Resp: 16   Temp: 97.3 °F (36.3 °C)   SpO2: 97%      Current Status 12/14/2020   ECOG score 0   Pain 0/10    Physical Exam   Constitutional: He is oriented to person, place, and time. He appears well-developed.   Eyes: Conjunctivae are normal.   Neck: No thyromegaly present.   Cardiovascular: Normal rate and regular rhythm. Exam reveals no gallop and no friction rub.   No murmur heard.  Pulmonary/Chest: Breath sounds normal. No respiratory distress.   Abdominal: Soft. Bowel sounds are normal. He exhibits no distension. There is no abdominal tenderness.   Lymphadenopathy:        Head (right side): No submandibular adenopathy present.     He has no cervical adenopathy. No inguinal adenopathy noted on the right or left side.        Right: No supraclavicular adenopathy present.        Left: No supraclavicular adenopathy present.   Neurological: He is alert and oriented to person, place, and time. He displays normal reflexes. No cranial nerve deficit. He exhibits normal muscle tone.   Skin: Skin is warm and dry. No rash noted.   Psychiatric: His behavior is normal.   Patient was examined today,  unchanged from above    RECENT LABS:  Hematology WBC   Date Value Ref Range Status   12/14/2020 8.50 3.40 - 10.80 10*3/mm3 Final   11/19/2020 5.86 3.40 - 10.80 10*3/mm3 Final     RBC   Date Value Ref Range Status   12/14/2020 5.36 4.14 - 5.80 10*6/mm3 Final   11/19/2020 5.10 4.14 - 5.80 10*6/mm3 Final     Hemoglobin   Date Value Ref Range Status   12/14/2020 15.7 13.0 - 17.7 g/dL Final     Hematocrit   Date Value Ref Range Status   12/14/2020 48.2 37.5 - 51.0 % Final     Platelets   Date Value Ref Range Status   12/14/2020 302 140 - 450 10*3/mm3 Final        Lab Results   Component Value Date    GLUCOSE 119 (H) 12/14/2020    BUN 26 (H) 12/14/2020    CREATININE 1.00 12/14/2020    EGFRIFNONA 77 12/14/2020    EGFRIFAFRI 94 11/19/2020    BCR 26.0 (H) 12/14/2020    K 4.2 12/14/2020    CO2 31.4 (H) 12/14/2020    CALCIUM 9.8 12/14/2020    PROTENTOTREF 6.9 11/19/2020    ALBUMIN 4.70 12/14/2020    LABIL2 2.1 11/19/2020    AST 22 12/14/2020    ALT 24 12/14/2020         Assessment/Plan     1. 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  Patient reports anticoagulation x3 to 4 months.  · Acute left calf DVT 9/26/2018 while off anticoagulation, treated with Xarelto.  Follow-up Doppler 11/12/2018 with chronic right calf DVT, acute left calf DVT  · Hypercoagulable evaluation 11/5/2018 with factor V Leiden heterozygosity, negative prothrombin gene mutation, protein C activity 112%, protein S free 104%, protein S activity 78%, negative anticardiolipin antibody panel, negative beta-2 GP 1 antibody panel, negative lupus anticoagulant.  · Due to unprovoked recurrent DVT in the setting of factor V Leiden heterozygosity, recommended long-term anticoagulation with Xarelto 20 mg daily.  · The patient returns today for a six-month follow-up visit.  He has done well in the interim continuing on Xarelto 20 mg daily with no bleeding issues.  The patient  will continue on Xarelto as planned and I will see him back for further follow-up in 6 months again.        Plan:  1. Continue long-term anticoagulation with Xarelto 20 mg daily.  2. MD visit in 6 months with BK PRICE    I did spend 15 minutes with the patient today, 12 minutes in face-to-face consultation and coordination of care reviewing issues outlined the assessment and plan above.  Patient did have questions regarding his other medical issues which I answered to his satisfaction.

## 2020-12-10 ENCOUNTER — APPOINTMENT (OUTPATIENT)
Dept: ULTRASOUND IMAGING | Facility: HOSPITAL | Age: 60
End: 2020-12-10

## 2020-12-14 ENCOUNTER — OFFICE VISIT (OUTPATIENT)
Dept: ONCOLOGY | Facility: CLINIC | Age: 60
End: 2020-12-14

## 2020-12-14 ENCOUNTER — LAB (OUTPATIENT)
Dept: OTHER | Facility: HOSPITAL | Age: 60
End: 2020-12-14

## 2020-12-14 VITALS
WEIGHT: 232 LBS | SYSTOLIC BLOOD PRESSURE: 149 MMHG | HEIGHT: 72 IN | RESPIRATION RATE: 16 BRPM | HEART RATE: 87 BPM | BODY MASS INDEX: 31.42 KG/M2 | TEMPERATURE: 97.3 F | OXYGEN SATURATION: 97 % | DIASTOLIC BLOOD PRESSURE: 86 MMHG

## 2020-12-14 DIAGNOSIS — I82.409 RECURRENT DEEP VEIN THROMBOSIS (DVT) (HCC): ICD-10-CM

## 2020-12-14 DIAGNOSIS — D68.51 FACTOR V LEIDEN MUTATION (HCC): ICD-10-CM

## 2020-12-14 DIAGNOSIS — D68.51 FACTOR V LEIDEN MUTATION (HCC): Primary | ICD-10-CM

## 2020-12-14 LAB
ALBUMIN SERPL-MCNC: 4.7 G/DL (ref 3.5–5.2)
ALBUMIN/GLOB SERPL: 1.6 G/DL
ALP SERPL-CCNC: 66 U/L (ref 39–117)
ALT SERPL W P-5'-P-CCNC: 24 U/L (ref 1–41)
ANION GAP SERPL CALCULATED.3IONS-SCNC: 5.6 MMOL/L (ref 5–15)
AST SERPL-CCNC: 22 U/L (ref 1–40)
BASOPHILS # BLD AUTO: 0.07 10*3/MM3 (ref 0–0.2)
BASOPHILS NFR BLD AUTO: 0.8 % (ref 0–1.5)
BILIRUB SERPL-MCNC: 0.5 MG/DL (ref 0–1.2)
BUN SERPL-MCNC: 26 MG/DL (ref 6–20)
BUN/CREAT SERPL: 26 (ref 7–25)
CALCIUM SPEC-SCNC: 9.8 MG/DL (ref 8.6–10.5)
CHLORIDE SERPL-SCNC: 100 MMOL/L (ref 98–107)
CO2 SERPL-SCNC: 31.4 MMOL/L (ref 22–29)
CREAT SERPL-MCNC: 1 MG/DL (ref 0.76–1.27)
DEPRECATED RDW RBC AUTO: 39.9 FL (ref 37–54)
EOSINOPHIL # BLD AUTO: 0.09 10*3/MM3 (ref 0–0.4)
EOSINOPHIL NFR BLD AUTO: 1.1 % (ref 0.3–6.2)
ERYTHROCYTE [DISTWIDTH] IN BLOOD BY AUTOMATED COUNT: 12.1 % (ref 12.3–15.4)
GFR SERPL CREATININE-BSD FRML MDRD: 77 ML/MIN/1.73
GLOBULIN UR ELPH-MCNC: 3 GM/DL
GLUCOSE SERPL-MCNC: 119 MG/DL (ref 65–99)
HCT VFR BLD AUTO: 48.2 % (ref 37.5–51)
HGB BLD-MCNC: 15.7 G/DL (ref 13–17.7)
IMM GRANULOCYTES # BLD AUTO: 0.05 10*3/MM3 (ref 0–0.05)
IMM GRANULOCYTES NFR BLD AUTO: 0.6 % (ref 0–0.5)
LYMPHOCYTES # BLD AUTO: 2.96 10*3/MM3 (ref 0.7–3.1)
LYMPHOCYTES NFR BLD AUTO: 34.8 % (ref 19.6–45.3)
MCH RBC QN AUTO: 29.3 PG (ref 26.6–33)
MCHC RBC AUTO-ENTMCNC: 32.6 G/DL (ref 31.5–35.7)
MCV RBC AUTO: 89.9 FL (ref 79–97)
MONOCYTES # BLD AUTO: 0.6 10*3/MM3 (ref 0.1–0.9)
MONOCYTES NFR BLD AUTO: 7.1 % (ref 5–12)
NEUTROPHILS NFR BLD AUTO: 4.73 10*3/MM3 (ref 1.7–7)
NEUTROPHILS NFR BLD AUTO: 55.6 % (ref 42.7–76)
NRBC BLD AUTO-RTO: 0 /100 WBC (ref 0–0.2)
PLATELET # BLD AUTO: 302 10*3/MM3 (ref 140–450)
PMV BLD AUTO: 9.2 FL (ref 6–12)
POTASSIUM SERPL-SCNC: 4.2 MMOL/L (ref 3.5–5.2)
PROT SERPL-MCNC: 7.7 G/DL (ref 6–8.5)
RBC # BLD AUTO: 5.36 10*6/MM3 (ref 4.14–5.8)
SODIUM SERPL-SCNC: 137 MMOL/L (ref 136–145)
WBC # BLD AUTO: 8.5 10*3/MM3 (ref 3.4–10.8)

## 2020-12-14 PROCEDURE — 99213 OFFICE O/P EST LOW 20 MIN: CPT | Performed by: INTERNAL MEDICINE

## 2020-12-14 PROCEDURE — 80053 COMPREHEN METABOLIC PANEL: CPT | Performed by: INTERNAL MEDICINE

## 2020-12-14 PROCEDURE — 85025 COMPLETE CBC W/AUTO DIFF WBC: CPT | Performed by: INTERNAL MEDICINE

## 2020-12-14 PROCEDURE — 36415 COLL VENOUS BLD VENIPUNCTURE: CPT

## 2020-12-14 RX ORDER — DOXYCYCLINE HYCLATE 100 MG/1
100 CAPSULE ORAL 2 TIMES DAILY
COMMUNITY
Start: 2020-12-11 | End: 2020-12-14

## 2020-12-15 ENCOUNTER — TELEPHONE (OUTPATIENT)
Dept: SPORTS MEDICINE | Facility: CLINIC | Age: 60
End: 2020-12-15

## 2020-12-15 RX ORDER — TIZANIDINE 4 MG/1
TABLET ORAL
Qty: 30 TABLET | Refills: 0 | Status: SHIPPED | OUTPATIENT
Start: 2020-12-15 | End: 2021-01-21

## 2020-12-15 RX ORDER — PREDNISONE 10 MG/1
TABLET ORAL
Qty: 30 TABLET | Refills: 0 | Status: SHIPPED | OUTPATIENT
Start: 2020-12-15 | End: 2021-01-21

## 2020-12-15 NOTE — TELEPHONE ENCOUNTER
Patient's wife called and wanted to know what the patient should do or if you can call something in for him because the patient is having some severe back pain. He had originally scheduled an appointment with  but the pain got worse so he went to Urgent care. He has finished his medrol dose pack that was prescribed by urgent care but it is still pretty severe pain. The patient states that the pain is right side of the lower back into the buttock area.

## 2020-12-15 NOTE — TELEPHONE ENCOUNTER
Patients wife called and stated that they got the notice from [facility] that there was another prednisone rx called in for Ck.   She wanted to know if it was ok to take that prednisone pack even though he just finished a prednisone pack on Saturday.   Please advise, thank you.

## 2020-12-18 ENCOUNTER — HOSPITAL ENCOUNTER (OUTPATIENT)
Dept: ULTRASOUND IMAGING | Facility: HOSPITAL | Age: 60
Discharge: HOME | End: 2020-12-18
Admitting: FAMILY MEDICINE

## 2020-12-18 DIAGNOSIS — G89.29 CHRONIC RUQ PAIN: ICD-10-CM

## 2020-12-18 DIAGNOSIS — R10.11 CHRONIC RUQ PAIN: ICD-10-CM

## 2020-12-18 PROCEDURE — 76705 ECHO EXAM OF ABDOMEN: CPT

## 2020-12-19 DIAGNOSIS — I82.409 RECURRENT DEEP VEIN THROMBOSIS (DVT) (HCC): ICD-10-CM

## 2020-12-21 RX ORDER — RIVAROXABAN 20 MG/1
TABLET, FILM COATED ORAL
Qty: 90 TABLET | Refills: 1 | Status: SHIPPED | OUTPATIENT
Start: 2020-12-21 | End: 2021-07-28

## 2020-12-28 ENCOUNTER — TELEPHONE (OUTPATIENT)
Dept: SPORTS MEDICINE | Facility: CLINIC | Age: 60
End: 2020-12-28

## 2020-12-28 DIAGNOSIS — M54.16 PAIN, RADICULAR, LUMBAR: Primary | ICD-10-CM

## 2020-12-28 DIAGNOSIS — Z85.46 HISTORY OF PROSTATE CANCER: ICD-10-CM

## 2020-12-28 NOTE — TELEPHONE ENCOUNTER
Patient's wife called and wanted to know what they should do next in regards to the patient's back pain. Patient states that he has not gotten better

## 2021-01-04 ENCOUNTER — TELEPHONE (OUTPATIENT)
Dept: SPORTS MEDICINE | Facility: CLINIC | Age: 61
End: 2021-01-04

## 2021-01-04 NOTE — TELEPHONE ENCOUNTER
Patient's wife called and states that she has no heard anything back from the MRI and the pain is now traveling down to his glutes. She states that this has been going on for a month and doesn't know what they need to do or if you can see him this week for this issue. Please advise, thank you!

## 2021-01-06 ENCOUNTER — HOSPITAL ENCOUNTER (OUTPATIENT)
Dept: MRI IMAGING | Facility: HOSPITAL | Age: 61
Discharge: HOME | End: 2021-01-06
Admitting: FAMILY MEDICINE

## 2021-01-06 DIAGNOSIS — M48.062 SPINAL STENOSIS OF LUMBAR REGION WITH NEUROGENIC CLAUDICATION: Primary | ICD-10-CM

## 2021-01-06 DIAGNOSIS — Z85.46 HISTORY OF PROSTATE CANCER: ICD-10-CM

## 2021-01-06 DIAGNOSIS — M54.16 PAIN, RADICULAR, LUMBAR: ICD-10-CM

## 2021-01-06 PROCEDURE — 0 GADOBENATE DIMEGLUMINE 529 MG/ML SOLUTION: Performed by: FAMILY MEDICINE

## 2021-01-06 PROCEDURE — A9577 INJ MULTIHANCE: HCPCS | Performed by: FAMILY MEDICINE

## 2021-01-06 PROCEDURE — 82565 ASSAY OF CREATININE: CPT

## 2021-01-06 PROCEDURE — 72158 MRI LUMBAR SPINE W/O & W/DYE: CPT

## 2021-01-06 RX ADMIN — GADOBENATE DIMEGLUMINE 20 ML: 529 INJECTION, SOLUTION INTRAVENOUS at 09:32

## 2021-01-07 LAB — CREAT BLDA-MCNC: 0.9 MG/DL (ref 0.6–1.3)

## 2021-01-11 NOTE — PROGRESS NOTES
Subjective   History of Present Illness: Ck Arreguin is a 60 y.o. male is being seen for consultation today at the request of Eduard Bee,* for constant back pain to the right side of the back mostly in the buttocks began spontaneously without event.  Initially was quite severe and radiating into the right thigh and even below the knee on the lateral surface of the right calf for a time.  He had to go to urgent treatment because of the severity after he went to a concert at [facility] in [place], [place].  Urgent treatment placed him on low-dose prednisone pack and he contacted Dr. Bee who put him on a high-dose prednisone pack.  He reports that within the last week his leg pain has improved. He denies numbness, tingling and weakness. He has tried oral steroids and muscle relaxer's. He finds rheumatic relief with sitting.  Throughout all this he has never had any left-sided or left leg symptoms.  He denies any bowel or bladder symptoms.    While in the room and during my examination of the patient I wore a mask and eye protection.  I washed my hands before and after this patient encounter.  The patient was also wearing a mask.    Back Pain  The problem occurs constantly. The problem has been gradually worsening since onset. The pain is present in the lumbar spine. The quality of the pain is described as aching. The pain is moderate. The symptoms are aggravated by position and standing (walking). Pertinent negatives include no bladder incontinence, bowel incontinence, chest pain, fever, leg pain, numbness, tingling or weakness. He has tried muscle relaxant (oral steroids) for the symptoms.       The following portions of the patient's history were reviewed and updated as appropriate: allergies, current medications, past family history, past medical history, past social history, past surgical history and problem list.    Review of Systems   Constitutional: Positive for activity change.  "Negative for fever.   HENT: Negative.  Negative for congestion.    Eyes: Negative.  Negative for visual disturbance.   Respiratory: Negative.  Negative for chest tightness and shortness of breath.    Cardiovascular: Negative.  Negative for chest pain.   Gastrointestinal: Negative.  Negative for bowel incontinence and nausea.   Endocrine: Negative.  Negative for cold intolerance.   Genitourinary: Negative.  Negative for bladder incontinence and difficulty urinating.   Musculoskeletal: Positive for back pain.   Skin: Negative.  Negative for rash.   Allergic/Immunologic: Negative.  Negative for environmental allergies.   Neurological: Negative.  Negative for tingling, weakness and numbness.   Hematological: Negative.  Does not bruise/bleed easily.   Psychiatric/Behavioral: Negative.  Negative for sleep disturbance.       Objective     Vitals:    01/21/21 1512   BP: 156/82   Pulse: 83   Temp: 98.2 °F (36.8 °C)   Weight: 104 kg (230 lb)   Height: 182.9 cm (72\")     Body mass index is 31.19 kg/m².      Physical Exam  Neurologic Exam    Physical Exam:    CONSTITUTIONAL: This 60 year old right handed  male appears well developed, well-nourished and in no acute distress.    HEAD & FACE: the head and face are symmetric, normocephalic and atraumatic.    EYES: Inspection of the conjunctivae and lids reveals no swelling, erythema or discharge.  Pupils are round, equal and reactive to light and there is no scleral icterus.    EARS, NOSE, MOUTH & THROAT: On inspection, the ears and nose are within normal limits.    NECK: the neck is supple and symmetric. The trachea is midline with no masses.    PULMONARY: Respiratory effort is normal with no increased work of breathing or signs of respiratory distress.    CARDIOVASCULAR: Pedal pulses are +2/4 bilaterally. Examination of the extremities shows no edema or varicosities.    LYMPHATIC: There is no palpable lymphadenopathy of the neck.    MUSCULOSKELETAL: Gait and station are " within normal limits. The spine has normal alignment and range of motion.  There is no palpable tenderness of the SI joints or midline lumbar spine.    SKIN: The skin is warm, dry and intact    NEUROLOGIC:   Cranial Nerves 2-12 intact  Normal motor strength noted. Muscle bulk and tone are normal.  Sensory exam is normal to fine touch to confrontational testing bilaterally  Reflexes on the right side demonstrates 1/4 Knee Jerk Reflex, 1/4 Ankle Jerk Reflex and no ankle clonus on the right.   Reflexes on the left side demonstrates 1/4 Knee Jerk Reflex, 1/4 Ankle Jerk Reflex and no ankle clonus on the left.  Superficial/Primitive Reflexes: primitive reflexes were absent.  Stevens's, Babinski, and Clonus signs all negative.  No coordination deficit observed.  Radicular testing showed a negative Herve (DEBBIE) test and negative straight leg raise.  Cortical function is intact and without deficits. Speech is normal.    PSYCHIATRIC: oriented to person, place and time. Patient's mood and affect are normal.    Assessment/Plan   Independent Review of Radiographic Studies:      I personally reviewed the images from the following studies.    MRI of the lumbar spine done at [facility] on January 6, 2021 reveals fairly considerable degenerative change throughout the lumbar spine.  In the lower 2 lumbar levels there appears to be more severe stenosis as well as lateral recess narrowing.  To the left at L5-S1 there appears to be a disc extrusion superior to the disc space but the radiologist has described it as a posterior disc bulge creating severe left foraminal encroachment.    Medical Decision Making:      Patient described spontaneous onset of right sided buttock pain that went into the hamstring area severely 6 weeks ago.  It did extend below the knee on the lateral surface of the leg for a period of time but that is also resolved.  Now the pain is only in the right buttock and is alleviated by sitting.  He never had  left leg symptoms.  So compared to my clinical exam the MRI and his description I think this is piriformis syndrome rather than a specific radiculopathy.  He has no severe neuroforaminal narrowing on the right side on the MRI to correlate with his pattern of symptoms.    Therefore he should be a good candidate for physical therapy to resolve his symptoms completely.  I discussed with him what to look out for if he became symptomatic for lumbar spinal stenosis with neurogenic claudication.  He is going to follow-up in 3 to 4 weeks after he completes a course of physical therapy directed at piriformis syndrome and sciatica in the right leg.    Return in about 4 weeks (around 2/18/2021) for discussion of Physical Therapy results.    Diagnoses and all orders for this visit:    1. Piriformis syndrome, right (Primary)  -     Ambulatory Referral to Physical Therapy Evaluate and treat    2. Lumbar degenerative disc disease  -     Ambulatory Referral to Physical Therapy Evaluate and treat    3. Spinal stenosis, lumbar region, without neurogenic claudication  -     Ambulatory Referral to Physical Therapy Evaluate and treat           Edgardo Chu MD FACS FAANS  Neurological Surgery

## 2021-01-21 ENCOUNTER — OFFICE VISIT (OUTPATIENT)
Dept: NEUROSURGERY | Facility: CLINIC | Age: 61
End: 2021-01-21

## 2021-01-21 VITALS
TEMPERATURE: 98.2 F | HEART RATE: 83 BPM | WEIGHT: 230 LBS | DIASTOLIC BLOOD PRESSURE: 82 MMHG | BODY MASS INDEX: 31.15 KG/M2 | HEIGHT: 72 IN | SYSTOLIC BLOOD PRESSURE: 156 MMHG

## 2021-01-21 DIAGNOSIS — M51.36 LUMBAR DEGENERATIVE DISC DISEASE: ICD-10-CM

## 2021-01-21 DIAGNOSIS — G57.01 PIRIFORMIS SYNDROME, RIGHT: Primary | ICD-10-CM

## 2021-01-21 DIAGNOSIS — M48.061 SPINAL STENOSIS, LUMBAR REGION, WITHOUT NEUROGENIC CLAUDICATION: ICD-10-CM

## 2021-01-21 PROBLEM — M51.369 LUMBAR DEGENERATIVE DISC DISEASE: Status: ACTIVE | Noted: 2021-01-21

## 2021-01-21 PROCEDURE — 99203 OFFICE O/P NEW LOW 30 MIN: CPT | Performed by: NEUROLOGICAL SURGERY

## 2021-01-27 ENCOUNTER — TREATMENT (OUTPATIENT)
Dept: PHYSICAL THERAPY | Facility: CLINIC | Age: 61
End: 2021-01-27

## 2021-01-27 DIAGNOSIS — M54.41 ACUTE BILATERAL LOW BACK PAIN WITH RIGHT-SIDED SCIATICA: ICD-10-CM

## 2021-01-27 DIAGNOSIS — G57.01 PIRIFORMIS SYNDROME OF RIGHT SIDE: Primary | ICD-10-CM

## 2021-01-27 PROCEDURE — 97110 THERAPEUTIC EXERCISES: CPT | Performed by: PHYSICAL THERAPIST

## 2021-01-27 PROCEDURE — 97161 PT EVAL LOW COMPLEX 20 MIN: CPT | Performed by: PHYSICAL THERAPIST

## 2021-01-27 PROCEDURE — 97035 APP MDLTY 1+ULTRASOUND EA 15: CPT | Performed by: PHYSICAL THERAPIST

## 2021-01-27 PROCEDURE — 97140 MANUAL THERAPY 1/> REGIONS: CPT | Performed by: PHYSICAL THERAPIST

## 2021-01-27 NOTE — PROGRESS NOTES
Physical Therapy Initial Evaluation and Plan of Care    Patient: Ck Arreguin   : 1960  Diagnosis/ICD-10 Code:  Piriformis syndrome of right side [G57.01]  Referring practitioner: Edgardo Chu,*    Subjective Evaluation    History of Present Illness  Mechanism of injury: Insidious onset LBP mid November - awakened with pain.  Few weeks later was up on his feet for prolonged period while out of town.  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  Current pain ratin  At best pain ratin  At worst pain ratin  Location: Right upper buttocks  and into deep butocks, last week into right posteiror thigh and lateral calf, has had some weakness in the leg  Quality: dull ache, discomfort and radiating  Relieving factors: relaxation and medications (sitting)  Aggravating factors: ambulation and prolonged positioning (get in/out of car)  Progression: improved    Social Support  Lives in: multiple-level home  Lives with: spouse    Diagnostic Tests  X-ray: abnormal  MRI studies: abnormal    Treatments  Previous treatment: medication  Current treatment: medication  Patient Goals  Patient goal: NOrmal activity without pain           Objective        Special Questions      Additional Special Questions  Negative responses to lumbar special questions      Postural Observations  Seated posture: fair  Standing posture: fair    Additional Postural Observation Details  Slouched seated posture    Palpation   Left   Hypertonic in the erector spinae, lumbar paraspinals and quadratus lumborum.     Right   Hypertonic in the erector spinae,  lumbar paraspinals and quadratus lumborum.     Additional Palpation Details  Tenderness in the deep posterior hip rotator muscles     Tenderness     Lumbar Spine  No tenderness in the spinous process.     Right Hip   No tenderness in the PSIS.     Additional Tenderness Details  No tenderness in the spine or sacral region    Neurological Testing     Sensation     Lumbar   Left   Intact: light touch    Right   Intact: light touch    Active Range of Motion     Lumbar   Flexion: 75 degrees   Extension: 75 degrees   Left lateral flexion: 100 degrees   Right lateral flexion: 75 degrees   Left rotation: 100 degrees   Right rotation: 100 degrees     Additional Active Range of Motion Details  Measurements listed as taken in percentages      Strength/Myotome Testing     Lumbar   Left   Normal strength    Right   Normal strength    Tests     Lumbar     Right   Positive quadrant.   Negative femoral stretch, passive SLR and valsalva.     Right Pelvic Girdle/Sacrum   Negative: sacrum compression and sacral spring.     Lumbar Flexibility Comments:   Restricted mobility in internal and external rotationi as well as hamstrings and ITB bilaterally           Assessment & Plan     Assessment  Impairments: abnormal muscle firing, abnormal muscle tone, abnormal or restricted ROM, lacks appropriate home exercise program and weight-bearing intolerance  Assessment details: 60 y.o. male with insidious onset LBP and right buttocks and LE pain presents with: 1.  Intermittent LBP and right buttocks pain, 2. Resolving LE symptoms, 3. Increased muscle tone in spinal PVM and posterior hip mm, 4. Decreased flexibility of hip and other LE musculature, 5. Tenderness to deep palpation of the right (piriformis and gemelli muscles, 6. Decreased tolerance for WB and many other normal ADL's  Prognosis: good  Functional Limitations: carrying objects, lifting, walking, standing and stooping  Goals  Plan Goals: Short Term Goals: 2 weeks  Patient will be able  to tolerate initial exercises  Patient will have pain <4/10  Patient will be able to stand/walk for >15 minutes without increased symptoms   Patient will be able to lift 10# to waist without increased symptoms     Long Term Goals: 4 weeks  Patient will be independent in performing home exercise program.  Patient will have functional pain free spinal and LE AROM  Patient will be able to stand/walk for >30 minutes without increased symptoms   Patient will be able to do work out in the yard to include carry firewood without increased symptoms         Plan  Therapy options: will be seen for skilled physical therapy services  Planned modality interventions: ultrasound and dry needling  Planned therapy interventions: manual therapy, soft tissue mobilization, joint mobilization, home exercise program, strengthening, stretching and flexibility  Frequency: 2x week  Duration in visits: 8  Duration in weeks: 4  Plan details: Access Code: [email or web address]   URL: [email or web address]   Date: 01/27/2021   Prepared by: Kayleen Johnson     Exercises  Seated Hip External Rotation Stretch - 3 reps - 1 sets - 20 hold - 3x daily - 7x weekly  Supine ITB Stretch with Strap - 3 reps - 1 sets - 20 hold - 3x daily - 7x weekly  Previously issued SKC, LTR, Piriformis stretch and Figure 4 stretch (via wife, who is also a current patient)        Manual Therapy:    15     mins  89203;  Therapeutic Exercise:    15     mins  [id number];     Neuromuscular Jay:    0    mins  33207;    Therapeutic Activity:     5     mins  66863;   Anatomy review with atlas    Evaluation Time:     25  mins  Timed Treatment:   43   mins   Total Treatment:     70   mins    PT SIGNATURE: Kayleen Johnson, PT   DATE TREATMENT INITIATED: 1/27/2021    Initial Certification  Certification Period: 4/27/2021  I certify that the therapy services are furnished while this patient is under my care.  The services outlined above are required by this patient, and will be reviewed every 90  days.     PHYSICIAN: Edgardo Chu MD      DATE:     Please sign and return via fax to [phone].. Thank you, [facility].

## 2021-02-02 ENCOUNTER — TREATMENT (OUTPATIENT)
Dept: PHYSICAL THERAPY | Facility: CLINIC | Age: 61
End: 2021-02-02

## 2021-02-02 DIAGNOSIS — M54.41 ACUTE BILATERAL LOW BACK PAIN WITH RIGHT-SIDED SCIATICA: ICD-10-CM

## 2021-02-02 DIAGNOSIS — G57.01 PIRIFORMIS SYNDROME OF RIGHT SIDE: Primary | ICD-10-CM

## 2021-02-02 PROCEDURE — 97110 THERAPEUTIC EXERCISES: CPT | Performed by: PHYSICAL THERAPIST

## 2021-02-02 PROCEDURE — 97035 APP MDLTY 1+ULTRASOUND EA 15: CPT | Performed by: PHYSICAL THERAPIST

## 2021-02-02 PROCEDURE — 97140 MANUAL THERAPY 1/> REGIONS: CPT | Performed by: PHYSICAL THERAPIST

## 2021-02-02 NOTE — PROGRESS NOTES
Physical Therapy Daily Progress Note    VISIT#: 2    Subjective   Ck Arreguin reports: that he is continuing to get quite a bit better.  The sharp tightness and pain have greatly decreased.  States that the stretches have seemed to help.  Denies NT in the leg now.       Objective   Full spinal flexion except for flexion, limited by hamstrings tightness and RSB with some left lumbar tightness    Minimal tenderness in the piriformis muscle     See Exercise, Manual, and Modality Logs for complete treatment.     Patient Education: new home exercises  Access Code: [email or web address]   URL: [email or web address]   Date: 02/02/2021   Prepared by: Kayleen Johnson     Exercises  Supine Bridge - 15 reps - 2 sets - 1x daily - 7x weekly  Clamshell in Abduction - 15 reps - 2 sets - 1x daily - 7x weekly  Standing Repeated Hip Extension with Resistance - 10 reps - 2 sets - 1x daily - 7x weekly  Standing Hip Abduction with Anchored Resistance - 10 reps - 2 sets - 1x daily - 7x weekly  Standing Repeated Hip Flexion with Resistance - 10 reps - 2 sets - 1x daily - 7x weekly  Standing Hip Adduction with Resistance - 10 reps - 2 sets - 1x daily - 7x weekly    Assessment/Plan  Much improved.  Minimal tenderness in the piriformis and has increased spinal mobilty with minimal/no pain.  Increased exercise tolerances.  Good compliance to HEP    Progress strengthening /stabilization /functional activity           Manual Therapy:    10     mins  22588;  Therapeutic Exercise:    30     mins  53505;     Neuromuscular Jay:    0    mins  89977;    Therapeutic Activity:    5     mins  47062;     Dry Needling                  0_  mins    Timed Treatment:   53   mins   Total Treatment:     55   mins    Kayleen Johnson, PT  [place] License # [id number]  Physical Therapist

## 2021-02-04 ENCOUNTER — TREATMENT (OUTPATIENT)
Dept: PHYSICAL THERAPY | Facility: CLINIC | Age: 61
End: 2021-02-04

## 2021-02-04 DIAGNOSIS — G57.01 PIRIFORMIS SYNDROME OF RIGHT SIDE: Primary | ICD-10-CM

## 2021-02-04 DIAGNOSIS — M54.41 ACUTE BILATERAL LOW BACK PAIN WITH RIGHT-SIDED SCIATICA: ICD-10-CM

## 2021-02-04 PROCEDURE — 97110 THERAPEUTIC EXERCISES: CPT | Performed by: PHYSICAL THERAPIST

## 2021-02-04 NOTE — PROGRESS NOTES
Physical Therapy Daily Progress Note    VISIT#: 3    Subjective   Ck Arreguin reports: that he continues to improve.  States that he was able to walk for .5 miles last night without pain.  Able to sit for prolonged periods without pain.Has some muscle soreness from exercise but no pain.       Objective   No tenderness in right buttocks with deep palpation    Full spinal flexion, extension 75% but no pain    See Exercise, Manual, and Modality Logs for complete treatment.     Patient Education: need for further core stabilization     Assessment/Plan  Ck is much better this week than last.  NO tenderness and increased activity tolerance.  Needs additional core stabilizaiton     Progress strengthening /stabilization /functional activity           Manual Therapy:    0     mins  92531;  Therapeutic Exercise:    30/40     mins  46977;     Neuromuscular Jay:    0    mins  74260;    Therapeutic Activity:     0     mins  41157;     Dry Needling                  0_  mins    Timed Treatment:   30   mins   Total Treatment:     45   mins    Kayleen Johnson, PT  [place] License # [id number]  Physical Therapist

## 2021-02-09 ENCOUNTER — TREATMENT (OUTPATIENT)
Dept: PHYSICAL THERAPY | Facility: CLINIC | Age: 61
End: 2021-02-09

## 2021-02-09 DIAGNOSIS — G57.01 PIRIFORMIS SYNDROME OF RIGHT SIDE: Primary | ICD-10-CM

## 2021-02-09 DIAGNOSIS — M54.41 ACUTE BILATERAL LOW BACK PAIN WITH RIGHT-SIDED SCIATICA: ICD-10-CM

## 2021-02-09 PROCEDURE — 97140 MANUAL THERAPY 1/> REGIONS: CPT | Performed by: PHYSICAL THERAPIST

## 2021-02-09 PROCEDURE — 97110 THERAPEUTIC EXERCISES: CPT | Performed by: PHYSICAL THERAPIST

## 2021-02-09 PROCEDURE — 97530 THERAPEUTIC ACTIVITIES: CPT | Performed by: PHYSICAL THERAPIST

## 2021-02-09 NOTE — PROGRESS NOTES
MD Letter - Reassessment  Patient: Ck Arreguin   : 1960    Date of Initial Visit: Type: THERAPY  Noted: 2021  Today's Date: 2021  Patient seen for 4 sessions    Treatment has included: therapeutic exercise, neuromuscular re-education, manual therapy and ultrasound    Subjective   Ck states that his back is feeling quite good now.  States that he no longer has the posterior thigh pain or lower pain with walking, although has not been standing or walking for greater than 1 hour since he last saw you.   He states that he occasionally feels light numbness in the upper buttocks but it did not last long.  He states that he was able to carry small amounts of firewood without pain.     Objective   Spinal AROM -Full in all planes of spinal motion except for extension which is only slightly limited, but pain free  Strength - 5/5 throughout LE's  Sensation - intact  Palpation - slight increased tone in bilateral lumbar PVM  Special tests - negative for any signs of abnormal neural involvement  Activity tolerances - he is able to perform all of his normal ADL's without increased pain but has not been doing anything very challenging    Assessment/Plan  Patient has demonstrated significant improvement since the initiation of therapy.  The pain has essentially reolved.  The motion has increased but still slightly limited due to poor flexibility.  The activity tolerances have increased.  I feel that the patient would benefit from continuing his HEP but discontinuing his formal therapy.  If you have any questions concerning the care, please do not hesitate to contact me.          PT Signature: Kayleen Johnson, PT    Manual Therapy:    15     mins  63049;  Therapeutic Exercise:    20     mins  66885;     Neuromuscular Jay:    0    mins  27063;    Therapeutic Activity:     10     mins  51364;   Assessed status and discussed progression of HEP    Timed Treatment:   45   mins   Total Treatment:     50   mins

## 2021-02-11 NOTE — PROGRESS NOTES
"Subjective   History of Present Illness: Ck Arreguin is a 60 y.o. male is here today for follow-up after going to physical therapy that was ordered for constant back pain. He has tried oral steroids and muscle relaxer's.     Today, Mr. Arreguin reports that he received 90% relief with physical therapy. He reports that he has back pain very rarely.  He has been getting back into everyday activities including carrying firewood and blowing snow off of his driveway.  He has tolerated those activities without acceleration of symptoms.    While in the room and during my examination of the patient I wore a mask and eye protection.  I washed my hands before and after this patient encounter.  The patient was also wearing a mask.    Back Pain  The problem occurs rarely. The problem has been rapidly improving since onset. The pain is present in the lumbar spine. Pertinent negatives include no bladder incontinence, bowel incontinence, chest pain, leg pain, numbness or tingling. He has tried muscle relaxant (physical therapy, oral steroids) for the symptoms.       The following portions of the patient's history were reviewed and updated as appropriate: allergies, current medications, past family history, past medical history, past social history, past surgical history and problem list.    Review of Systems   Respiratory: Negative for chest tightness and shortness of breath.    Cardiovascular: Negative for chest pain.   Gastrointestinal: Negative for bowel incontinence.   Genitourinary: Negative for bladder incontinence.   Musculoskeletal: Positive for back pain.   Neurological: Negative for tingling and numbness.       Objective     Vitals:    02/16/21 1042   BP: 162/82   Pulse: 84   Temp: 98 °F (36.7 °C)   Weight: 104 kg (230 lb)   Height: 182.9 cm (72\")     Body mass index is 31.19 kg/m².      Physical Exam  Neurologic Exam    Physical Exam:    CONSTITUTIONAL:  appears well developed, well-nourished and in no acute " distress.    NECK: the neck is supple and symmetric. The trachea is midline with no masses.      PULMONARY: Respiratory effort is normal with no increased work of breathing or signs of respiratory distress.    CARDIOVASCULAR: Pedal pulses are +2/4 bilaterally. Examination of the extremities shows no edema or varicosities.    MUSCULOSKELETAL: Gait normal    SKIN: The skin is warm, dry and intact.      NEUROLOGIC:   Normal motor strength noted. Muscle bulk and tone are normal.  Sensory exam is normal to all modalities.    Cortical function is intact and without deficits. Speech is normal.    PSYCHIATRIC: oriented to person, place and time. Patient's mood and affect are normal.    Assessment/Plan   Independent Review of Radiographic Studies:      I personally reviewed the images from the following studies.    MRI of the lumbar spine done at [facility] on January 6, 2021 reveals fairly considerable degenerative change throughout the lumbar spine.  In the lower 2 lumbar levels there appears to be more severe stenosis as well as lateral recess narrowing.  To the left at L5-S1 there appears to be a disc extrusion superior to the disc space but the radiologist has described it as a posterior disc bulge creating severe left foraminal encroachment.    Medical Decision Making:      He responded very well to the physical therapy which also confirmed my suspicion of piriformis syndrome on the right.  At this point he has no radicular symptoms in the lower extremities and is starting to get back into normal activity including blowing snow and carrying firewood into his house.  I have suggested that he continue to accelerate his activity as he tolerates with a commonsense attitude continue the therapy exercises outlined by the physical therapist.  If he develops recurrent or progressive symptoms involving the back or legs I can always reevaluate.    Return if symptoms worsen or fail to improve.    Diagnoses and all orders for  this visit:    1. Piriformis syndrome, right (Primary)    2. Spinal stenosis, lumbar region, without neurogenic claudication    3. Lumbar degenerative disc disease             Edgardo Chu MD FACS FAANS  Neurological Surgery

## 2021-02-16 ENCOUNTER — OFFICE VISIT (OUTPATIENT)
Dept: NEUROSURGERY | Facility: CLINIC | Age: 61
End: 2021-02-16

## 2021-02-16 VITALS
HEIGHT: 72 IN | BODY MASS INDEX: 31.15 KG/M2 | TEMPERATURE: 98 F | SYSTOLIC BLOOD PRESSURE: 162 MMHG | WEIGHT: 230 LBS | DIASTOLIC BLOOD PRESSURE: 82 MMHG | HEART RATE: 84 BPM

## 2021-02-16 DIAGNOSIS — G57.01 PIRIFORMIS SYNDROME, RIGHT: Primary | ICD-10-CM

## 2021-02-16 DIAGNOSIS — M48.061 SPINAL STENOSIS, LUMBAR REGION, WITHOUT NEUROGENIC CLAUDICATION: ICD-10-CM

## 2021-02-16 DIAGNOSIS — M51.36 LUMBAR DEGENERATIVE DISC DISEASE: ICD-10-CM

## 2021-02-16 PROCEDURE — 99213 OFFICE O/P EST LOW 20 MIN: CPT | Performed by: NEUROLOGICAL SURGERY

## 2021-03-19 ENCOUNTER — OFFICE VISIT (OUTPATIENT)
Dept: SPORTS MEDICINE | Facility: CLINIC | Age: 61
End: 2021-03-19

## 2021-03-19 VITALS
WEIGHT: 230 LBS | TEMPERATURE: 98.6 F | HEIGHT: 72 IN | SYSTOLIC BLOOD PRESSURE: 128 MMHG | DIASTOLIC BLOOD PRESSURE: 82 MMHG | BODY MASS INDEX: 31.15 KG/M2 | OXYGEN SATURATION: 98 % | HEART RATE: 85 BPM | RESPIRATION RATE: 16 BRPM

## 2021-03-19 DIAGNOSIS — Z23 IMMUNIZATION DUE: ICD-10-CM

## 2021-03-19 DIAGNOSIS — Z12.11 SCREEN FOR COLON CANCER: ICD-10-CM

## 2021-03-19 DIAGNOSIS — Z00.00 PREVENTATIVE HEALTH CARE: Primary | ICD-10-CM

## 2021-03-19 PROCEDURE — 99396 PREV VISIT EST AGE 40-64: CPT | Performed by: FAMILY MEDICINE

## 2021-03-19 PROCEDURE — 90471 IMMUNIZATION ADMIN: CPT | Performed by: FAMILY MEDICINE

## 2021-03-19 PROCEDURE — 90715 TDAP VACCINE 7 YRS/> IM: CPT | Performed by: FAMILY MEDICINE

## 2021-03-19 NOTE — PROGRESS NOTES
"Ck Arreguin is here today for an annual physical exam.     Piriformis is doing  Much better.    Recent treatment with antibiotics for hydrocele R testicle.     PHQ-2 Depression Screening  Little interest or pleasure in doing things? 0   Feeling down, depressed, or hopeless? 0   PHQ-2 Total Score 0        I have reviewed the patient's medical, family, and social history in detail and updated the computerized patient record.    Screening history:  Colonoscopy - due  Prostate - done  Metabolic - last year    Health Maintenance   Topic Date Due   • COLONOSCOPY  Never done   • Pneumococcal Vaccine 0-64 (1 of 1 - PPSV23) Never done   • TDAP/TD VACCINES (1 - Tdap) Never done   • ZOSTER VACCINE (1 of 2) Never done   • ANNUAL PHYSICAL  09/17/2017   • HEPATITIS C SCREENING  Never done   • INFLUENZA VACCINE  Never done   • PT PLAN OF CARE  Never done   • MENINGOCOCCAL VACCINE  Aged Out       Review of Systems    /82 (BP Location: Left arm, Patient Position: Sitting, Cuff Size: Adult)   Pulse 85   Temp 98.6 °F (37 °C)   Resp 16   Ht 182.9 cm (72\")   Wt 104 kg (230 lb)   SpO2 98%   BMI 31.19 kg/m²      Physical Exam    Vital signs reviewed.  General appearance: No acute distress  Eyes: conjunctiva clear without erythema; pupils equally round and reactive  ENT: external ears normal; hearing normal  Neck: supple; no thyromegaly  CV: normal rate and rhythm; no peripheral edema  Respiratory: normal respiratory effort; lungs clear to auscultation bilaterally  MSK: normal gait and station; no focal joint deformity or swelling  Skin: no rash or wounds; normal turgor  Neuro: cranial nerves 2-12 grossly intact; normal sensation to light touch  Psych: mood and affect normal; recent and remote memory intact    No visits with results within 2 Week(s) from this visit.   Latest known visit with results is:   Hospital Outpatient Visit on 01/06/2021   Component Date Value Ref Range Status   • Creatinine 01/06/2021 0.90  0.60 - " 1.30 mg/dL Final    Serial Number: [id number]:  [id number]          Current Outpatient Medications:   •  CIALIS 20 MG tablet, , Disp: , Rfl:   •  Xarelto 20 MG tablet, TAKE 1 TABLET BY MOUTH EVERY DAY, Disp: 90 tablet, Rfl: 1    Diagnoses and all orders for this visit:    1. Preventative health care (Primary)    2. Screen for colon cancer  -     Ambulatory Referral to Gastroenterology    3. Immunization due  -     Tdap Vaccine Greater Than or Equal To 6yo IM          Shingrix d/w patient.       Encourage healthy diet and exercise. Plan to repeat a1c in 6 months.     Encourage patient to stay up to date on screening examinations as indicated based on age and risk factors.    EMR Dragon/Transcription disclaimer:    Much of this encounter note is an electronic transcription/translation of spoken language to printed text.  The electronic translation of spoken language may permit erroneous, or at times, nonsensical words or phrases to be inadvertently transcribed.  Although I have reviewed the note for such errors some may still exist.

## 2021-03-30 ENCOUNTER — BULK ORDERING (OUTPATIENT)
Dept: CASE MANAGEMENT | Facility: OTHER | Age: 61
End: 2021-03-30

## 2021-03-30 DIAGNOSIS — Z23 IMMUNIZATION DUE: ICD-10-CM

## 2021-03-31 ENCOUNTER — TELEPHONE (OUTPATIENT)
Dept: ONCOLOGY | Facility: CLINIC | Age: 61
End: 2021-03-31

## 2021-03-31 NOTE — TELEPHONE ENCOUNTER
Pt's spouse called to see if our office recommends a certain covid-19 vaccine. No answer, left detailed vm that we don't recommend one over another and to get what is available to them. Instructions left to call w/ questions.

## 2021-03-31 NOTE — TELEPHONE ENCOUNTER
Provider: MARTIN  Caller: EDWIN   Relationship to Patient: SPOUSE  Phone Number: [phone]  Reason for Call: PT WANTS TO KNOW IF THERE IS A CERTAIN COVID 19 SHOT TO GET THAT IS BETTER THAN ANOTHER, PT HAS BLOOD CLOTS.

## 2021-04-02 ENCOUNTER — IMMUNIZATION (OUTPATIENT)
Dept: VACCINE CLINIC | Facility: HOSPITAL | Age: 61
End: 2021-04-02

## 2021-04-02 DIAGNOSIS — Z23 IMMUNIZATION DUE: ICD-10-CM

## 2021-04-02 PROCEDURE — 0001A: CPT | Performed by: INTERNAL MEDICINE

## 2021-04-02 PROCEDURE — 91300 HC SARSCOV02 VAC 30MCG/0.3ML IM: CPT | Performed by: INTERNAL MEDICINE

## 2021-04-23 ENCOUNTER — APPOINTMENT (OUTPATIENT)
Dept: VACCINE CLINIC | Facility: HOSPITAL | Age: 61
End: 2021-04-23

## 2021-04-30 ENCOUNTER — TELEPHONE (OUTPATIENT)
Dept: VACCINE CLINIC | Facility: HOSPITAL | Age: 61
End: 2021-04-30

## 2021-05-19 ENCOUNTER — IMMUNIZATION (OUTPATIENT)
Dept: VACCINE CLINIC | Facility: HOSPITAL | Age: 61
End: 2021-05-19

## 2021-05-19 PROCEDURE — 0002A: CPT | Performed by: INTERNAL MEDICINE

## 2021-05-19 PROCEDURE — 91300 HC SARSCOV02 VAC 30MCG/0.3ML IM: CPT | Performed by: INTERNAL MEDICINE

## 2021-06-14 ENCOUNTER — OFFICE VISIT (OUTPATIENT)
Dept: ONCOLOGY | Facility: CLINIC | Age: 61
End: 2021-06-14

## 2021-06-14 ENCOUNTER — LAB (OUTPATIENT)
Dept: OTHER | Facility: HOSPITAL | Age: 61
End: 2021-06-14

## 2021-06-14 VITALS
OXYGEN SATURATION: 94 % | HEIGHT: 72 IN | BODY MASS INDEX: 31.64 KG/M2 | TEMPERATURE: 97.5 F | WEIGHT: 233.6 LBS | SYSTOLIC BLOOD PRESSURE: 123 MMHG | HEART RATE: 68 BPM | RESPIRATION RATE: 18 BRPM | DIASTOLIC BLOOD PRESSURE: 80 MMHG

## 2021-06-14 DIAGNOSIS — I82.409 RECURRENT DEEP VEIN THROMBOSIS (DVT) (HCC): Primary | ICD-10-CM

## 2021-06-14 DIAGNOSIS — D68.51 FACTOR V LEIDEN MUTATION (HCC): ICD-10-CM

## 2021-06-14 DIAGNOSIS — I82.409 RECURRENT DEEP VEIN THROMBOSIS (DVT) (HCC): ICD-10-CM

## 2021-06-14 LAB
ALBUMIN SERPL-MCNC: 4.5 G/DL (ref 3.5–5.2)
ALBUMIN/GLOB SERPL: 1.6 G/DL
ALP SERPL-CCNC: 62 U/L (ref 39–117)
ALT SERPL W P-5'-P-CCNC: 15 U/L (ref 1–41)
ANION GAP SERPL CALCULATED.3IONS-SCNC: 8.5 MMOL/L (ref 5–15)
AST SERPL-CCNC: 25 U/L (ref 1–40)
BASOPHILS # BLD AUTO: 0.02 10*3/MM3 (ref 0–0.2)
BASOPHILS NFR BLD AUTO: 0.3 % (ref 0–1.5)
BILIRUB SERPL-MCNC: 0.4 MG/DL (ref 0–1.2)
BUN SERPL-MCNC: 28 MG/DL (ref 6–20)
BUN/CREAT SERPL: 29.2 (ref 7–25)
CALCIUM SPEC-SCNC: 9.5 MG/DL (ref 8.6–10.5)
CHLORIDE SERPL-SCNC: 108 MMOL/L (ref 98–107)
CO2 SERPL-SCNC: 24.5 MMOL/L (ref 22–29)
CREAT SERPL-MCNC: 0.96 MG/DL (ref 0.76–1.27)
DEPRECATED RDW RBC AUTO: 41.5 FL (ref 37–54)
EOSINOPHIL # BLD AUTO: 0.07 10*3/MM3 (ref 0–0.4)
EOSINOPHIL NFR BLD AUTO: 1.2 % (ref 0.3–6.2)
ERYTHROCYTE [DISTWIDTH] IN BLOOD BY AUTOMATED COUNT: 12.5 % (ref 12.3–15.4)
GFR SERPL CREATININE-BSD FRML MDRD: 80 ML/MIN/1.73
GLOBULIN UR ELPH-MCNC: 2.9 GM/DL
GLUCOSE SERPL-MCNC: 103 MG/DL (ref 65–99)
HCT VFR BLD AUTO: 44.2 % (ref 37.5–51)
HGB BLD-MCNC: 14.2 G/DL (ref 13–17.7)
IMM GRANULOCYTES # BLD AUTO: 0.02 10*3/MM3 (ref 0–0.05)
IMM GRANULOCYTES NFR BLD AUTO: 0.3 % (ref 0–0.5)
LYMPHOCYTES # BLD AUTO: 1.5 10*3/MM3 (ref 0.7–3.1)
LYMPHOCYTES NFR BLD AUTO: 25.5 % (ref 19.6–45.3)
MCH RBC QN AUTO: 29.1 PG (ref 26.6–33)
MCHC RBC AUTO-ENTMCNC: 32.1 G/DL (ref 31.5–35.7)
MCV RBC AUTO: 90.6 FL (ref 79–97)
MONOCYTES # BLD AUTO: 0.58 10*3/MM3 (ref 0.1–0.9)
MONOCYTES NFR BLD AUTO: 9.8 % (ref 5–12)
NEUTROPHILS NFR BLD AUTO: 3.7 10*3/MM3 (ref 1.7–7)
NEUTROPHILS NFR BLD AUTO: 62.9 % (ref 42.7–76)
NRBC BLD AUTO-RTO: 0 /100 WBC (ref 0–0.2)
PLATELET # BLD AUTO: 273 10*3/MM3 (ref 140–450)
PMV BLD AUTO: 10.3 FL (ref 6–12)
POTASSIUM SERPL-SCNC: 4.2 MMOL/L (ref 3.5–5.2)
PROT SERPL-MCNC: 7.4 G/DL (ref 6–8.5)
RBC # BLD AUTO: 4.88 10*6/MM3 (ref 4.14–5.8)
SODIUM SERPL-SCNC: 141 MMOL/L (ref 136–145)
WBC # BLD AUTO: 5.89 10*3/MM3 (ref 3.4–10.8)

## 2021-06-14 PROCEDURE — 99214 OFFICE O/P EST MOD 30 MIN: CPT | Performed by: INTERNAL MEDICINE

## 2021-06-14 PROCEDURE — 85025 COMPLETE CBC W/AUTO DIFF WBC: CPT | Performed by: INTERNAL MEDICINE

## 2021-06-14 PROCEDURE — 80053 COMPREHEN METABOLIC PANEL: CPT | Performed by: INTERNAL MEDICINE

## 2021-06-14 PROCEDURE — 36415 COLL VENOUS BLD VENIPUNCTURE: CPT

## 2021-06-14 NOTE — PROGRESS NOTES
"Chief Complaint  Recurrent unprovoked DVT in the setting of factor V Leiden heterozygosity    Subjective        History of Present Illness  Patient returns today in follow-up continuing chronic anticoagulation with Xarelto 20 mg daily with laboratory studies to review.  In the interval, the patient reports that he has done fairly well.  He did have some difficulty with orthopedic issues involving his left shoulder and back however reports that both of these are improved after course of physical therapy.  He has not experienced any bleeding issues on Xarelto.  He remains active.  He has not experienced any significant swelling issues in his lower extremities.  He does wear compression stockings but only when he has to drive a long distance.  It appears that his primary care physician has recently referred him to GI to discuss screening colonoscopy however that has not been scheduled at this point.  He did receive his COVID-19 vaccine in April ([employer]) and did well without any difficulties      Objective   Vital Signs:   /80   Pulse 68   Temp 97.5 °F (36.4 °C) (Temporal)   Resp 18   Ht 183 cm (72.05\")   Wt 106 kg (233 lb 9.6 oz)   SpO2 94%   BMI 31.64 kg/m²     Physical Exam  Constitutional:       Appearance: He is well-developed.   Eyes:      Conjunctiva/sclera: Conjunctivae normal.   Neck:      Thyroid: No thyromegaly.   Cardiovascular:      Rate and Rhythm: Normal rate and regular rhythm.      Heart sounds: No murmur heard.   No friction rub. No gallop.    Pulmonary:      Effort: No respiratory distress.      Breath sounds: Normal breath sounds.   Abdominal:      General: Bowel sounds are normal. There is no distension.      Palpations: Abdomen is soft.      Tenderness: There is no abdominal tenderness.   Lymphadenopathy:      Head:      Right side of head: No submandibular adenopathy.      Cervical: No cervical adenopathy.      Upper Body:      Right upper body: No supraclavicular adenopathy.      " Left upper body: No supraclavicular adenopathy.   Skin:     General: Skin is warm and dry.      Findings: No rash.   Neurological:      Mental Status: He is alert and oriented to person, place, and time.      Cranial Nerves: No cranial nerve deficit.      Motor: No abnormal muscle tone.      Deep Tendon Reflexes: Reflexes normal.   Psychiatric:         Behavior: Behavior normal.        Result Review : Reviewed CBC, CMP from today.  Reviewed progress note from PCP 3/19/2021 and from urology.       Assessment and Plan   1. 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  Patient reports anticoagulation x3 to 4 months.  · Acute left calf DVT 9/26/2018 while off anticoagulation, treated with Xarelto.  Follow-up Doppler 11/12/2018 with chronic right calf DVT, acute left calf DVT  · Hypercoagulable evaluation 11/5/2018 with factor V Leiden heterozygosity, negative prothrombin gene mutation, protein C activity 112%, protein S free 104%, protein S activity 78%, negative anticardiolipin antibody panel, negative beta-2 GP 1 antibody panel, negative lupus anticoagulant.  · Due to unprovoked recurrent DVT in the setting of factor V Leiden heterozygosity, recommended long-term anticoagulation with Xarelto 20 mg daily.  · The patient returns today in follow-up continuing on long-term anticoagulation with Xarelto 20 mg daily.  He has not experienced any significant bleeding issues.  He has no difficulty with any postphlebitic symptoms in his lower extremities, does wear compression stockings only when he is on a long drive.  He has no edema evident today.  Renal and hepatic function are normal.  BUN is slightly elevated and I did encourage him to adequately hydrate.  I will plan to see him back in 6 months again for routine follow-up.  We did discuss that if a procedure is required we certainly can hold Xarelto briefly, noting that he has been  referred to GI to discuss colonoscopy.         Plan:  1. Continue long-term anticoagulation with Xarelto 20 mg daily.  2. MD visit in 6 months with CBC, CMP

## 2021-07-28 DIAGNOSIS — I82.409 RECURRENT DEEP VEIN THROMBOSIS (DVT) (HCC): ICD-10-CM

## 2021-07-28 RX ORDER — RIVAROXABAN 20 MG/1
TABLET, FILM COATED ORAL
Qty: 90 TABLET | Refills: 1 | Status: SHIPPED | OUTPATIENT
Start: 2021-07-28 | End: 2022-02-14

## 2021-08-28 ENCOUNTER — APPOINTMENT (OUTPATIENT)
Dept: GENERAL RADIOLOGY | Facility: HOSPITAL | Age: 61
End: 2021-08-28

## 2021-08-28 PROCEDURE — 73610 X-RAY EXAM OF ANKLE: CPT | Performed by: FAMILY MEDICINE

## 2021-12-06 ENCOUNTER — APPOINTMENT (OUTPATIENT)
Dept: OTHER | Facility: HOSPITAL | Age: 61
End: 2021-12-06

## 2022-01-10 ENCOUNTER — LAB (OUTPATIENT)
Dept: OTHER | Facility: HOSPITAL | Age: 62
End: 2022-01-10

## 2022-01-10 ENCOUNTER — OFFICE VISIT (OUTPATIENT)
Dept: ONCOLOGY | Facility: CLINIC | Age: 62
End: 2022-01-10

## 2022-01-10 VITALS
HEIGHT: 72 IN | HEART RATE: 74 BPM | RESPIRATION RATE: 18 BRPM | SYSTOLIC BLOOD PRESSURE: 134 MMHG | TEMPERATURE: 97.3 F | BODY MASS INDEX: 32.38 KG/M2 | OXYGEN SATURATION: 96 % | WEIGHT: 239.1 LBS | DIASTOLIC BLOOD PRESSURE: 87 MMHG

## 2022-01-10 DIAGNOSIS — I82.409 RECURRENT DEEP VEIN THROMBOSIS (DVT): Primary | ICD-10-CM

## 2022-01-10 DIAGNOSIS — D68.51 FACTOR V LEIDEN MUTATION: ICD-10-CM

## 2022-01-10 DIAGNOSIS — I82.409 RECURRENT DEEP VEIN THROMBOSIS (DVT): ICD-10-CM

## 2022-01-10 LAB
ALBUMIN SERPL-MCNC: 5 G/DL (ref 3.5–5.2)
ALBUMIN/GLOB SERPL: 1.8 G/DL
ALP SERPL-CCNC: 70 U/L (ref 39–117)
ALT SERPL W P-5'-P-CCNC: 14 U/L (ref 1–41)
ANION GAP SERPL CALCULATED.3IONS-SCNC: 11.2 MMOL/L (ref 5–15)
AST SERPL-CCNC: 21 U/L (ref 1–40)
BASOPHILS # BLD AUTO: 0.03 10*3/MM3 (ref 0–0.2)
BASOPHILS NFR BLD AUTO: 0.5 % (ref 0–1.5)
BILIRUB SERPL-MCNC: 0.5 MG/DL (ref 0–1.2)
BUN SERPL-MCNC: 18 MG/DL (ref 8–23)
BUN/CREAT SERPL: 17.3 (ref 7–25)
CALCIUM SPEC-SCNC: 9.7 MG/DL (ref 8.6–10.5)
CHLORIDE SERPL-SCNC: 100 MMOL/L (ref 98–107)
CO2 SERPL-SCNC: 26.8 MMOL/L (ref 22–29)
CREAT SERPL-MCNC: 1.04 MG/DL (ref 0.76–1.27)
DEPRECATED RDW RBC AUTO: 39.9 FL (ref 37–54)
EOSINOPHIL # BLD AUTO: 0.05 10*3/MM3 (ref 0–0.4)
EOSINOPHIL NFR BLD AUTO: 0.8 % (ref 0.3–6.2)
ERYTHROCYTE [DISTWIDTH] IN BLOOD BY AUTOMATED COUNT: 12 % (ref 12.3–15.4)
GFR SERPL CREATININE-BSD FRML MDRD: 73 ML/MIN/1.73
GLOBULIN UR ELPH-MCNC: 2.8 GM/DL
GLUCOSE SERPL-MCNC: 133 MG/DL (ref 65–99)
HCT VFR BLD AUTO: 48.5 % (ref 37.5–51)
HGB BLD-MCNC: 15.7 G/DL (ref 13–17.7)
IMM GRANULOCYTES # BLD AUTO: 0.01 10*3/MM3 (ref 0–0.05)
IMM GRANULOCYTES NFR BLD AUTO: 0.2 % (ref 0–0.5)
LYMPHOCYTES # BLD AUTO: 1.65 10*3/MM3 (ref 0.7–3.1)
LYMPHOCYTES NFR BLD AUTO: 25.7 % (ref 19.6–45.3)
MCH RBC QN AUTO: 29.3 PG (ref 26.6–33)
MCHC RBC AUTO-ENTMCNC: 32.4 G/DL (ref 31.5–35.7)
MCV RBC AUTO: 90.5 FL (ref 79–97)
MONOCYTES # BLD AUTO: 0.66 10*3/MM3 (ref 0.1–0.9)
MONOCYTES NFR BLD AUTO: 10.3 % (ref 5–12)
NEUTROPHILS NFR BLD AUTO: 4.02 10*3/MM3 (ref 1.7–7)
NEUTROPHILS NFR BLD AUTO: 62.5 % (ref 42.7–76)
NRBC BLD AUTO-RTO: 0 /100 WBC (ref 0–0.2)
PLATELET # BLD AUTO: 289 10*3/MM3 (ref 140–450)
PMV BLD AUTO: 10 FL (ref 6–12)
POTASSIUM SERPL-SCNC: 4.4 MMOL/L (ref 3.5–5.2)
PROT SERPL-MCNC: 7.8 G/DL (ref 6–8.5)
RBC # BLD AUTO: 5.36 10*6/MM3 (ref 4.14–5.8)
SODIUM SERPL-SCNC: 138 MMOL/L (ref 136–145)
WBC NRBC COR # BLD: 6.42 10*3/MM3 (ref 3.4–10.8)

## 2022-01-10 PROCEDURE — 36415 COLL VENOUS BLD VENIPUNCTURE: CPT

## 2022-01-10 PROCEDURE — 99214 OFFICE O/P EST MOD 30 MIN: CPT | Performed by: INTERNAL MEDICINE

## 2022-01-10 PROCEDURE — 85025 COMPLETE CBC W/AUTO DIFF WBC: CPT | Performed by: INTERNAL MEDICINE

## 2022-01-10 PROCEDURE — 80053 COMPREHEN METABOLIC PANEL: CPT | Performed by: INTERNAL MEDICINE

## 2022-01-10 RX ORDER — SILDENAFIL CITRATE 20 MG/1
TABLET ORAL
COMMUNITY
Start: 2021-12-20 | End: 2023-01-30

## 2022-01-10 NOTE — PROGRESS NOTES
"Chief Complaint  Recurrent unprovoked DVT in the setting of factor V Leiden heterozygosity    Subjective        History of Present Illness  Patient returns today in follow-up continuing chronic anticoagulation with Xarelto 20 mg daily with laboratory studies to review.  Since his last visit he reports that he has done relatively well.  He notes that there has been improvement in his left shoulder and back pain after undergoing a course of physical therapy.  He has not required any procedures and does not anticipate needing any in the near future.  He continues to tolerate anticoagulation well with no bleeding complications.  He does have some very minimal bilateral lower extremity edema at the end of the day, does not wear his compression stockings on a regular basis.      Objective   Vital Signs:   /87   Pulse 74   Temp 97.3 °F (36.3 °C) (Temporal)   Resp 18   Ht 183 cm (72.05\")   Wt 108 kg (239 lb 1.6 oz)   SpO2 96%   BMI 32.39 kg/m²     Physical Exam  Constitutional:       Appearance: He is well-developed.   Eyes:      Conjunctiva/sclera: Conjunctivae normal.   Neck:      Thyroid: No thyromegaly.   Cardiovascular:      Rate and Rhythm: Normal rate and regular rhythm.      Heart sounds: No murmur heard.  No friction rub. No gallop.    Pulmonary:      Effort: No respiratory distress.      Breath sounds: Normal breath sounds.   Chest:   Breasts:      Right: No supraclavicular adenopathy.      Left: No supraclavicular adenopathy.       Abdominal:      General: Bowel sounds are normal. There is no distension.      Palpations: Abdomen is soft.      Tenderness: There is no abdominal tenderness.   Lymphadenopathy:      Head:      Right side of head: No submandibular adenopathy.      Cervical: No cervical adenopathy.      Upper Body:      Right upper body: No supraclavicular adenopathy.      Left upper body: No supraclavicular adenopathy.   Skin:     General: Skin is warm and dry.      Findings: No rash. "   Neurological:      Mental Status: He is alert and oriented to person, place, and time.      Cranial Nerves: No cranial nerve deficit.      Motor: No abnormal muscle tone.      Deep Tendon Reflexes: Reflexes normal.   Psychiatric:         Behavior: Behavior normal.        Patient was examined today, unchanged from above    Result Review : Reviewed CBC, CMP from today.  Reviewed notes from PCP and urology.       Assessment and Plan   1. 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  Patient reports anticoagulation x3 to 4 months.  · Acute left calf DVT 9/26/2018 while off anticoagulation, treated with Xarelto.  Follow-up Doppler 11/12/2018 with chronic right calf DVT, acute left calf DVT  · Hypercoagulable evaluation 11/5/2018 with factor V Leiden heterozygosity, negative prothrombin gene mutation, protein C activity 112%, protein S free 104%, protein S activity 78%, negative anticardiolipin antibody panel, negative beta-2 GP 1 antibody panel, negative lupus anticoagulant.  · Due to unprovoked recurrent DVT in the setting of factor V Leiden heterozygosity, recommended long-term anticoagulation with Xarelto 20 mg daily.  · The patient returns today in follow-up continuing on long-term anticoagulation with Xarelto 20 mg daily.  Patient returns today reporting that he has tolerated anticoagulation well with no evidence of bleeding complications.  He has some minimal edema in his lower extremities usually at the end of the day, does not routinely wear his compression stockings but does have some.  I encouraged him to use these during times when he is going to be up on his feet for longer periods of time.  He does not anticipate any  upcoming procedures but is aware to contact us for instructions to hold Xarelto if anything arises in the future.  2. Prostate cancer  · Status post prostatectomy in 2008  · Patient continues routine  follow-up on an annual basis with urology, last PSA on 12/13/2021 was 0.        Plan:  1. Continue long-term anticoagulation with Xarelto 20 mg daily.  2. MD visit in 6 months with CBC, CMP

## 2022-02-12 DIAGNOSIS — I82.409 RECURRENT DEEP VEIN THROMBOSIS (DVT): ICD-10-CM

## 2022-02-14 RX ORDER — RIVAROXABAN 20 MG/1
TABLET, FILM COATED ORAL
Qty: 90 TABLET | Refills: 1 | Status: SHIPPED | OUTPATIENT
Start: 2022-02-14 | End: 2022-07-25

## 2022-07-18 ENCOUNTER — APPOINTMENT (OUTPATIENT)
Dept: OTHER | Facility: HOSPITAL | Age: 62
End: 2022-07-18

## 2022-07-18 ENCOUNTER — TELEPHONE (OUTPATIENT)
Dept: ONCOLOGY | Facility: CLINIC | Age: 62
End: 2022-07-18

## 2022-07-25 ENCOUNTER — LAB (OUTPATIENT)
Dept: OTHER | Facility: HOSPITAL | Age: 62
End: 2022-07-25

## 2022-07-25 ENCOUNTER — OFFICE VISIT (OUTPATIENT)
Dept: ONCOLOGY | Facility: CLINIC | Age: 62
End: 2022-07-25

## 2022-07-25 VITALS
RESPIRATION RATE: 16 BRPM | HEART RATE: 68 BPM | HEIGHT: 72 IN | DIASTOLIC BLOOD PRESSURE: 82 MMHG | TEMPERATURE: 97.1 F | SYSTOLIC BLOOD PRESSURE: 146 MMHG | BODY MASS INDEX: 31.53 KG/M2 | WEIGHT: 232.8 LBS | OXYGEN SATURATION: 97 %

## 2022-07-25 DIAGNOSIS — I82.409 RECURRENT DEEP VEIN THROMBOSIS (DVT): ICD-10-CM

## 2022-07-25 DIAGNOSIS — D68.51 FACTOR V LEIDEN MUTATION: Primary | ICD-10-CM

## 2022-07-25 LAB
ALBUMIN SERPL-MCNC: 4.5 G/DL (ref 3.5–5.2)
ALBUMIN/GLOB SERPL: 1.7 G/DL
ALP SERPL-CCNC: 65 U/L (ref 39–117)
ALT SERPL W P-5'-P-CCNC: 13 U/L (ref 1–41)
ANION GAP SERPL CALCULATED.3IONS-SCNC: 9.4 MMOL/L (ref 5–15)
AST SERPL-CCNC: 19 U/L (ref 1–40)
BASOPHILS # BLD AUTO: 0.02 10*3/MM3 (ref 0–0.2)
BASOPHILS NFR BLD AUTO: 0.3 % (ref 0–1.5)
BILIRUB SERPL-MCNC: 0.4 MG/DL (ref 0–1.2)
BUN SERPL-MCNC: 17 MG/DL (ref 8–23)
BUN/CREAT SERPL: 16.8 (ref 7–25)
CALCIUM SPEC-SCNC: 9.5 MG/DL (ref 8.6–10.5)
CHLORIDE SERPL-SCNC: 104 MMOL/L (ref 98–107)
CO2 SERPL-SCNC: 27.6 MMOL/L (ref 22–29)
CREAT SERPL-MCNC: 1.01 MG/DL (ref 0.76–1.27)
DEPRECATED RDW RBC AUTO: 40.9 FL (ref 37–54)
EGFRCR SERPLBLD CKD-EPI 2021: 85.1 ML/MIN/1.73
EOSINOPHIL # BLD AUTO: 0.07 10*3/MM3 (ref 0–0.4)
EOSINOPHIL NFR BLD AUTO: 1.1 % (ref 0.3–6.2)
ERYTHROCYTE [DISTWIDTH] IN BLOOD BY AUTOMATED COUNT: 12.4 % (ref 12.3–15.4)
GLOBULIN UR ELPH-MCNC: 2.7 GM/DL
GLUCOSE SERPL-MCNC: 112 MG/DL (ref 65–99)
HCT VFR BLD AUTO: 47.6 % (ref 37.5–51)
HGB BLD-MCNC: 15.5 G/DL (ref 13–17.7)
IMM GRANULOCYTES # BLD AUTO: 0.01 10*3/MM3 (ref 0–0.05)
IMM GRANULOCYTES NFR BLD AUTO: 0.2 % (ref 0–0.5)
LYMPHOCYTES # BLD AUTO: 1.85 10*3/MM3 (ref 0.7–3.1)
LYMPHOCYTES NFR BLD AUTO: 29.4 % (ref 19.6–45.3)
MCH RBC QN AUTO: 29.6 PG (ref 26.6–33)
MCHC RBC AUTO-ENTMCNC: 32.6 G/DL (ref 31.5–35.7)
MCV RBC AUTO: 90.8 FL (ref 79–97)
MONOCYTES # BLD AUTO: 0.5 10*3/MM3 (ref 0.1–0.9)
MONOCYTES NFR BLD AUTO: 7.9 % (ref 5–12)
NEUTROPHILS NFR BLD AUTO: 3.84 10*3/MM3 (ref 1.7–7)
NEUTROPHILS NFR BLD AUTO: 61.1 % (ref 42.7–76)
NRBC BLD AUTO-RTO: 0 /100 WBC (ref 0–0.2)
PLATELET # BLD AUTO: 274 10*3/MM3 (ref 140–450)
PMV BLD AUTO: 10.1 FL (ref 6–12)
POTASSIUM SERPL-SCNC: 4.8 MMOL/L (ref 3.5–5.2)
PROT SERPL-MCNC: 7.2 G/DL (ref 6–8.5)
RBC # BLD AUTO: 5.24 10*6/MM3 (ref 4.14–5.8)
SODIUM SERPL-SCNC: 141 MMOL/L (ref 136–145)
WBC NRBC COR # BLD: 6.29 10*3/MM3 (ref 3.4–10.8)

## 2022-07-25 PROCEDURE — 85025 COMPLETE CBC W/AUTO DIFF WBC: CPT | Performed by: INTERNAL MEDICINE

## 2022-07-25 PROCEDURE — 80053 COMPREHEN METABOLIC PANEL: CPT | Performed by: INTERNAL MEDICINE

## 2022-07-25 PROCEDURE — 36415 COLL VENOUS BLD VENIPUNCTURE: CPT

## 2022-07-25 PROCEDURE — 99213 OFFICE O/P EST LOW 20 MIN: CPT | Performed by: INTERNAL MEDICINE

## 2022-07-25 RX ORDER — TADALAFIL 5 MG
TABLET ORAL
COMMUNITY
Start: 2022-05-05 | End: 2022-07-25 | Stop reason: SDUPTHER

## 2022-07-25 NOTE — PROGRESS NOTES
"Chief Complaint  Recurrent unprovoked DVT in the setting of factor V Leiden heterozygosity    Subjective        History of Present Illness  Patient returns today in follow-up continuing chronic anticoagulation with Xarelto 20 mg daily with laboratory studies to review.  Patient has continued to do quite well since his last visit.  He has not been to see his primary care physician as his PCP retired and he is in the process of obtaining a new primary care provider.  He has had no complaints, tolerates anticoagulation well with no bleeding difficulties.  He is not experiencing any chronic lower extremity edema and does not use his compression stocking very often.  He has no other complaints today.      Objective   Vital Signs:   /82   Pulse 68   Temp 97.1 °F (36.2 °C) (Temporal)   Resp 16   Ht 183 cm (72.05\")   Wt 106 kg (232 lb 12.8 oz)   SpO2 97%   BMI 31.53 kg/m²     Physical Exam  Constitutional:       Appearance: He is well-developed.   Eyes:      Conjunctiva/sclera: Conjunctivae normal.   Neck:      Thyroid: No thyromegaly.   Cardiovascular:      Rate and Rhythm: Normal rate and regular rhythm.      Heart sounds: No murmur heard.    No friction rub. No gallop.   Pulmonary:      Effort: No respiratory distress.      Breath sounds: Normal breath sounds.   Chest:   Breasts:      Right: No supraclavicular adenopathy.      Left: No supraclavicular adenopathy.       Abdominal:      General: Bowel sounds are normal. There is no distension.      Palpations: Abdomen is soft.      Tenderness: There is no abdominal tenderness.   Lymphadenopathy:      Head:      Right side of head: No submandibular adenopathy.      Cervical: No cervical adenopathy.      Upper Body:      Right upper body: No supraclavicular adenopathy.      Left upper body: No supraclavicular adenopathy.   Skin:     General: Skin is warm and dry.      Findings: No rash.   Neurological:      Mental Status: He is alert and oriented to person, " place, and time.      Cranial Nerves: No cranial nerve deficit.      Motor: No abnormal muscle tone.      Deep Tendon Reflexes: Reflexes normal.   Psychiatric:         Behavior: Behavior normal.        Patient was examined today, unchanged from above    Result Review : Reviewed CBC, CMP from today.       Assessment and Plan   1. 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  Patient reports anticoagulation x3 to 4 months.  · Acute left calf DVT 9/26/2018 while off anticoagulation, treated with Xarelto.  Follow-up Doppler 11/12/2018 with chronic right calf DVT, acute left calf DVT  · Hypercoagulable evaluation 11/5/2018 with factor V Leiden heterozygosity, negative prothrombin gene mutation, protein C activity 112%, protein S free 104%, protein S activity 78%, negative anticardiolipin antibody panel, negative beta-2 GP 1 antibody panel, negative lupus anticoagulant.  · Due to unprovoked recurrent DVT in the setting of factor V Leiden heterozygosity, recommended long-term anticoagulation with Xarelto.  · The patient returns today doing well continuing on Xarelto at full dose 20 mg daily.  He has not experienced any bleeding issues, has no symptoms of chronic postphlebitic syndrome.  We did discuss the potential to decrease his Xarelto dose in the chronic setting to 10 mg daily.  He did have an unprovoked recurrence of DVT however it was distal involving only the calf.  He does have underlying factor V Leiden heterozygosity which is a persistent risk factor.  Patient would like to at least attempt a trial of lower dose Xarelto and this does seem reasonable at this timeframe for ongoing anticoagulation.  We do intend long-term therapy in this situation.  I did send a prescription today for Xarelto 10 mg daily.  Patient return in 6 months for follow-up.  2. Prostate cancer  · Status post prostatectomy in 2008  · Patient continues  routine follow-up on an annual basis with urology, last PSA on 12/13/2021 was 0.  3. Health maintenance/PCP  · Patient's primary care provider has retired and he has not had routine labs checked in almost 2 years including hemoglobin A1c, lipids, etc.  He is in the process of establishing a new primary care physician and I encouraged him to move forward with this.        Plan:  1. Continue long-term anticoagulation with Xarelto.  We will however decrease his dose from 20 mg daily down to 10 mg daily for long-term treatment.  New prescription sent today.  2. Patient will establish new primary care physician  3. MD visit in 6 months with CBC, CMP

## 2023-01-19 RX ORDER — RIVAROXABAN 10 MG/1
TABLET, FILM COATED ORAL
Qty: 90 TABLET | Refills: 1 | Status: SHIPPED | OUTPATIENT
Start: 2023-01-19

## 2023-01-27 NOTE — PROGRESS NOTES
"Chief Complaint  Recurrent unprovoked DVT in the setting of factor V Leiden heterozygosity    Subjective        History of Present Illness  Patient returns today for 6-month interval follow-up visit continuing on chronic anticoagulation with Xarelto.  At his last visit on 7/25/2022 we did decrease his dose from 20 mg down to 10 mg daily as a maintenance dose, intending for long-term treatment.  Patient has tolerated Xarelto very well, no bleeding complications.  He did see urology on 12/12/2022 and had PSA from 12/5/2022 that was negative.  He has still not established care with a new primary care physician.  He has no complaints today, remains very active.      Objective   Vital Signs:   /77   Pulse 64   Temp 97.1 °F (36.2 °C) (Temporal)   Resp 16   Ht 183 cm (72.05\")   Wt 108 kg (237 lb 12.8 oz)   SpO2 96%   BMI 32.21 kg/m²     Physical Exam  Constitutional:       Appearance: He is well-developed.   Eyes:      Conjunctiva/sclera: Conjunctivae normal.   Neck:      Thyroid: No thyromegaly.   Cardiovascular:      Rate and Rhythm: Normal rate and regular rhythm.      Heart sounds: No murmur heard.    No friction rub. No gallop.   Pulmonary:      Effort: No respiratory distress.      Breath sounds: Normal breath sounds.   Abdominal:      General: Bowel sounds are normal. There is no distension.      Palpations: Abdomen is soft.      Tenderness: There is no abdominal tenderness.   Lymphadenopathy:      Head:      Right side of head: No submandibular adenopathy.      Cervical: No cervical adenopathy.      Upper Body:      Right upper body: No supraclavicular adenopathy.      Left upper body: No supraclavicular adenopathy.   Skin:     General: Skin is warm and dry.      Findings: No rash.   Neurological:      Mental Status: He is alert and oriented to person, place, and time.      Cranial Nerves: No cranial nerve deficit.      Motor: No abnormal muscle tone.      Deep Tendon Reflexes: Reflexes normal. "   Psychiatric:         Behavior: Behavior normal.        Patient was examined today, unchanged from above    Result Review : Reviewed CBC, CMP from today.  Reviewed urology records.       Assessment and Plan   1. 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  Patient reports anticoagulation x3 to 4 months.  · Acute left calf DVT 9/26/2018 while off anticoagulation, treated with Xarelto.  Follow-up Doppler 11/12/2018 with chronic right calf DVT, acute left calf DVT  · Hypercoagulable evaluation 11/5/2018 with factor V Leiden heterozygosity, negative prothrombin gene mutation, protein C activity 112%, protein S free 104%, protein S activity 78%, negative anticardiolipin antibody panel, negative beta-2 GP 1 antibody panel, negative lupus anticoagulant.  · Due to unprovoked recurrent DVT in the setting of factor V Leiden heterozygosity, recommended long-term anticoagulation with Xarelto.  · Recommended long-term anticoagulation however patient felt to be a candidate to decrease from 20 mg down to 10 mg Xarelto daily.  Dose decreased on 7/25/2022.  · Patient returns today in follow-up continuing on chronic anticoagulation with reduced dose Xarelto at 10 mg daily for maintenance therapy.  He is tolerating this well with no bleeding difficulties.  There has been no clinical evidence of recurrent thrombosis.  He is still not established a new primary care physician and we will assist him in doing this.  For now until he establishes care with a new PCP we will monitor him on a 6-month basis as we continue to prescribe Xarelto.  2. Prostate cancer  · Status post prostatectomy in 2008  · Patient continues routine follow-up on an annual basis with urology, last PSA on 12/13/2021 was 0.  3. Health maintenance/PCP  · Patient's primary care provider has retired and he has not had routine labs checked in quite some time including hemoglobin  A1c, lipids, etc. we did discuss this at the last visit however he has had some trouble finding a physician with openings.  We will assist him with this today.        Plan:  1. Continue Xarelto 10 mg daily intending long-term treatment.    2. We will assist patient in establishing a new primary care physician  3. MD visit in 6 months with CBC, CMP

## 2023-01-30 ENCOUNTER — LAB (OUTPATIENT)
Dept: OTHER | Facility: HOSPITAL | Age: 63
End: 2023-01-30
Payer: COMMERCIAL

## 2023-01-30 ENCOUNTER — OFFICE VISIT (OUTPATIENT)
Dept: ONCOLOGY | Facility: CLINIC | Age: 63
End: 2023-01-30
Payer: COMMERCIAL

## 2023-01-30 VITALS
HEIGHT: 72 IN | RESPIRATION RATE: 16 BRPM | HEART RATE: 64 BPM | OXYGEN SATURATION: 96 % | SYSTOLIC BLOOD PRESSURE: 129 MMHG | TEMPERATURE: 97.1 F | BODY MASS INDEX: 32.21 KG/M2 | DIASTOLIC BLOOD PRESSURE: 77 MMHG | WEIGHT: 237.8 LBS

## 2023-01-30 DIAGNOSIS — I82.409 RECURRENT DEEP VEIN THROMBOSIS (DVT): ICD-10-CM

## 2023-01-30 DIAGNOSIS — D68.51 FACTOR V LEIDEN MUTATION: Primary | ICD-10-CM

## 2023-01-30 DIAGNOSIS — D68.51 FACTOR V LEIDEN MUTATION: ICD-10-CM

## 2023-01-30 DIAGNOSIS — Z76.89 ENCOUNTER TO ESTABLISH CARE: ICD-10-CM

## 2023-01-30 LAB
ALBUMIN SERPL-MCNC: 4.7 G/DL (ref 3.5–5.2)
ALBUMIN/GLOB SERPL: 1.6 G/DL
ALP SERPL-CCNC: 64 U/L (ref 39–117)
ALT SERPL W P-5'-P-CCNC: 16 U/L (ref 1–41)
ANION GAP SERPL CALCULATED.3IONS-SCNC: 7.6 MMOL/L (ref 5–15)
AST SERPL-CCNC: 21 U/L (ref 1–40)
BASOPHILS # BLD AUTO: 0.04 10*3/MM3 (ref 0–0.2)
BASOPHILS NFR BLD AUTO: 0.7 % (ref 0–1.5)
BILIRUB SERPL-MCNC: 0.5 MG/DL (ref 0–1.2)
BUN SERPL-MCNC: 20 MG/DL (ref 8–23)
BUN/CREAT SERPL: 19.8 (ref 7–25)
CALCIUM SPEC-SCNC: 9.6 MG/DL (ref 8.6–10.5)
CHLORIDE SERPL-SCNC: 102 MMOL/L (ref 98–107)
CO2 SERPL-SCNC: 28.4 MMOL/L (ref 22–29)
CREAT SERPL-MCNC: 1.01 MG/DL (ref 0.76–1.27)
DEPRECATED RDW RBC AUTO: 42.1 FL (ref 37–54)
EGFRCR SERPLBLD CKD-EPI 2021: 84.6 ML/MIN/1.73
EOSINOPHIL # BLD AUTO: 0.07 10*3/MM3 (ref 0–0.4)
EOSINOPHIL NFR BLD AUTO: 1.2 % (ref 0.3–6.2)
ERYTHROCYTE [DISTWIDTH] IN BLOOD BY AUTOMATED COUNT: 12.7 % (ref 12.3–15.4)
GLOBULIN UR ELPH-MCNC: 2.9 GM/DL
GLUCOSE SERPL-MCNC: 112 MG/DL (ref 65–99)
HCT VFR BLD AUTO: 48.7 % (ref 37.5–51)
HGB BLD-MCNC: 15.6 G/DL (ref 13–17.7)
IMM GRANULOCYTES # BLD AUTO: 0.01 10*3/MM3 (ref 0–0.05)
IMM GRANULOCYTES NFR BLD AUTO: 0.2 % (ref 0–0.5)
LYMPHOCYTES # BLD AUTO: 1.67 10*3/MM3 (ref 0.7–3.1)
LYMPHOCYTES NFR BLD AUTO: 28.1 % (ref 19.6–45.3)
MCH RBC QN AUTO: 28.9 PG (ref 26.6–33)
MCHC RBC AUTO-ENTMCNC: 32 G/DL (ref 31.5–35.7)
MCV RBC AUTO: 90.4 FL (ref 79–97)
MONOCYTES # BLD AUTO: 0.58 10*3/MM3 (ref 0.1–0.9)
MONOCYTES NFR BLD AUTO: 9.8 % (ref 5–12)
NEUTROPHILS NFR BLD AUTO: 3.57 10*3/MM3 (ref 1.7–7)
NEUTROPHILS NFR BLD AUTO: 60 % (ref 42.7–76)
NRBC BLD AUTO-RTO: 0 /100 WBC (ref 0–0.2)
PLATELET # BLD AUTO: 278 10*3/MM3 (ref 140–450)
PMV BLD AUTO: 9.7 FL (ref 6–12)
POTASSIUM SERPL-SCNC: 4.3 MMOL/L (ref 3.5–5.2)
PROT SERPL-MCNC: 7.6 G/DL (ref 6–8.5)
RBC # BLD AUTO: 5.39 10*6/MM3 (ref 4.14–5.8)
SODIUM SERPL-SCNC: 138 MMOL/L (ref 136–145)
WBC NRBC COR # BLD: 5.94 10*3/MM3 (ref 3.4–10.8)

## 2023-01-30 PROCEDURE — 80053 COMPREHEN METABOLIC PANEL: CPT | Performed by: INTERNAL MEDICINE

## 2023-01-30 PROCEDURE — 36415 COLL VENOUS BLD VENIPUNCTURE: CPT

## 2023-01-30 PROCEDURE — 85025 COMPLETE CBC W/AUTO DIFF WBC: CPT | Performed by: INTERNAL MEDICINE

## 2023-01-30 PROCEDURE — 99214 OFFICE O/P EST MOD 30 MIN: CPT | Performed by: INTERNAL MEDICINE

## 2023-01-30 RX ORDER — TADALAFIL 5 MG
TABLET ORAL
COMMUNITY
Start: 2022-11-01

## 2023-02-01 ENCOUNTER — TELEPHONE (OUTPATIENT)
Dept: FAMILY MEDICINE CLINIC | Facility: CLINIC | Age: 63
End: 2023-02-01

## 2023-02-09 ENCOUNTER — TELEPHONE (OUTPATIENT)
Dept: FAMILY MEDICINE CLINIC | Facility: CLINIC | Age: 63
End: 2023-02-09
Payer: COMMERCIAL

## 2023-02-09 NOTE — TELEPHONE ENCOUNTER
Caller: Ck Arreguin    Relationship to patient: Self    Best call back number: [phone]    Type of visit: NEW PATIENT     Additional notes: PATIENT WAS REFERRED TO DR. DOZIER FROM DR. SALAZAR, WITH [facility]. PATIENT IS REQUESTING A CALLBACK TO SCHEDULE APPOINTMENT WITH DR. DOZIER.

## 2023-05-12 ENCOUNTER — OFFICE VISIT (OUTPATIENT)
Dept: FAMILY MEDICINE CLINIC | Facility: CLINIC | Age: 63
End: 2023-05-12

## 2023-05-12 VITALS
SYSTOLIC BLOOD PRESSURE: 140 MMHG | RESPIRATION RATE: 18 BRPM | TEMPERATURE: 97.7 F | WEIGHT: 233 LBS | HEIGHT: 72 IN | HEART RATE: 62 BPM | BODY MASS INDEX: 31.56 KG/M2 | DIASTOLIC BLOOD PRESSURE: 88 MMHG | OXYGEN SATURATION: 96 %

## 2023-05-12 DIAGNOSIS — Z12.5 SCREENING FOR PROSTATE CANCER: ICD-10-CM

## 2023-05-12 DIAGNOSIS — Z12.11 SCREEN FOR COLON CANCER: ICD-10-CM

## 2023-05-12 DIAGNOSIS — R73.09 ELEVATED HEMOGLOBIN A1C: ICD-10-CM

## 2023-05-12 DIAGNOSIS — Z00.00 ANNUAL PHYSICAL EXAM: Primary | ICD-10-CM

## 2023-05-12 PROBLEM — C61 PROSTATE CANCER: Status: ACTIVE | Noted: 2023-05-12

## 2023-05-12 PROCEDURE — 99396 PREV VISIT EST AGE 40-64: CPT | Performed by: FAMILY MEDICINE

## 2023-05-12 NOTE — PROGRESS NOTES
"Chief Complaint  No chief complaint on file.      Subjective    History of Present Illness        Ck rAreguin presents to [facility] for   History of Present Illness   Ck Arreguin is a 62 y.o. male here for his annual physical with me. Ck is here for coordination of medical care, to discuss health maintenance, disease prevention as well as to followup on medical problems. Patient has been followed by me since 05/12/2023. Patient's last CPE was about a year ago. Activity level is minimal. Exercises 1 per week. Appetite is good. Feels well with few complaints. Energy level is good. Sleeps fairly well.     Objective   Vital Signs:   Visit Vitals  /88   Pulse 62   Temp 97.7 °F (36.5 °C)   Resp 18   Ht 183 cm (72.05\")   Wt 106 kg (233 lb)   SpO2 96%   BMI 31.56 kg/m²       BMI is >= 30 and <35. (Class 1 Obesity). The following options were offered after discussion;: exercise counseling/recommendations and nutrition counseling/recommendations     Physical Exam  Vitals reviewed.   Constitutional:       Appearance: He is well-developed.   HENT:      Head: Normocephalic and atraumatic.      Nose: Nose normal.   Eyes:      General: Lids are normal.      Conjunctiva/sclera: Conjunctivae normal.      Pupils: Pupils are equal, round, and reactive to light.   Cardiovascular:      Rate and Rhythm: Normal rate and regular rhythm.      Pulses: Normal pulses.      Heart sounds: Normal heart sounds.   Pulmonary:      Effort: Pulmonary effort is normal. No respiratory distress.      Breath sounds: Normal breath sounds.   Abdominal:      General: Bowel sounds are normal.      Palpations: Abdomen is soft.   Musculoskeletal:         General: Normal range of motion.      Cervical back: Normal range of motion and neck supple.   Skin:     General: Skin is warm and dry.      Capillary Refill: Capillary refill takes less than 2 seconds.   Neurological:      Mental Status: He is alert and oriented " to person, place, and time.   Psychiatric:         Behavior: Behavior normal.         Thought Content: Thought content normal.         Judgment: Judgment normal.          Result Review :                    Assessment and Plan      Diagnoses and all orders for this visit:    1. Annual physical exam (Primary)  Assessment & Plan:  Discussed injury prevention, diet and exercise, safe sexual practices, and screening for common diseases. Encouraged use of sunscreen and seatbelts. Avoidance of tobacco encouraged. Limitation or avoidance of alcohol encouraged. Recommend yearly dental and eye exams. Also discussed monitoring of blood pressure, lipids.    Orders:  -     CBC & Differential  -     Comprehensive Metabolic Panel  -     Lipid Panel With / Chol / HDL Ratio  -     TSH  -     Urinalysis With Microscopic - Urine, Clean Catch    2. Screen for colon cancer  -     Cologuard - Stool, Per Rectum; Future    3. Screening for prostate cancer  -     PSA Screen    4. Elevated hemoglobin A1c  -     Hemoglobin A1c             Follow Up   No follow-ups on file.  Patient was given instructions and counseling regarding his condition or for health maintenance advice. Please see specific information pulled into the AVS if appropriate.

## 2023-05-20 LAB
ALBUMIN SERPL-MCNC: 4.6 G/DL (ref 3.5–5.2)
ALBUMIN/GLOB SERPL: 2.1 G/DL
ALP SERPL-CCNC: 56 U/L (ref 39–117)
ALT SERPL-CCNC: 15 U/L (ref 1–41)
AST SERPL-CCNC: 17 U/L (ref 1–40)
BASOPHILS # BLD AUTO: 0.01 10*3/MM3 (ref 0–0.2)
BASOPHILS NFR BLD AUTO: 0.2 % (ref 0–1.5)
BILIRUB SERPL-MCNC: 0.5 MG/DL (ref 0–1.2)
BUN SERPL-MCNC: 17 MG/DL (ref 8–23)
BUN/CREAT SERPL: 18.3 (ref 7–25)
CALCIUM SERPL-MCNC: 9.6 MG/DL (ref 8.6–10.5)
CHLORIDE SERPL-SCNC: 103 MMOL/L (ref 98–107)
CHOLEST SERPL-MCNC: 170 MG/DL (ref 0–200)
CHOLEST/HDLC SERPL: 3.21 {RATIO}
CO2 SERPL-SCNC: 29 MMOL/L (ref 22–29)
CREAT SERPL-MCNC: 0.93 MG/DL (ref 0.76–1.27)
EGFRCR SERPLBLD CKD-EPI 2021: 93.4 ML/MIN/1.73
EOSINOPHIL # BLD AUTO: 0.09 10*3/MM3 (ref 0–0.4)
EOSINOPHIL NFR BLD AUTO: 2.1 % (ref 0.3–6.2)
ERYTHROCYTE [DISTWIDTH] IN BLOOD BY AUTOMATED COUNT: 12.2 % (ref 12.3–15.4)
GLOBULIN SER CALC-MCNC: 2.2 GM/DL
GLUCOSE SERPL-MCNC: 116 MG/DL (ref 65–99)
HBA1C MFR BLD: 6.3 % (ref 4.8–5.6)
HCT VFR BLD AUTO: 45.9 % (ref 37.5–51)
HDLC SERPL-MCNC: 53 MG/DL (ref 40–60)
HGB BLD-MCNC: 15.3 G/DL (ref 13–17.7)
IMM GRANULOCYTES # BLD AUTO: 0.01 10*3/MM3 (ref 0–0.05)
IMM GRANULOCYTES NFR BLD AUTO: 0.2 % (ref 0–0.5)
LDLC SERPL CALC-MCNC: 103 MG/DL (ref 0–100)
LYMPHOCYTES # BLD AUTO: 1.35 10*3/MM3 (ref 0.7–3.1)
LYMPHOCYTES NFR BLD AUTO: 31.3 % (ref 19.6–45.3)
MCH RBC QN AUTO: 29.3 PG (ref 26.6–33)
MCHC RBC AUTO-ENTMCNC: 33.3 G/DL (ref 31.5–35.7)
MCV RBC AUTO: 87.9 FL (ref 79–97)
MONOCYTES # BLD AUTO: 0.52 10*3/MM3 (ref 0.1–0.9)
MONOCYTES NFR BLD AUTO: 12 % (ref 5–12)
NEUTROPHILS # BLD AUTO: 2.34 10*3/MM3 (ref 1.7–7)
NEUTROPHILS NFR BLD AUTO: 54.2 % (ref 42.7–76)
NRBC BLD AUTO-RTO: 0 /100 WBC (ref 0–0.2)
PLATELET # BLD AUTO: 243 10*3/MM3 (ref 140–450)
POTASSIUM SERPL-SCNC: 4.3 MMOL/L (ref 3.5–5.2)
PROT SERPL-MCNC: 6.8 G/DL (ref 6–8.5)
PSA SERPL-MCNC: <0.014 NG/ML (ref 0–4)
RBC # BLD AUTO: 5.22 10*6/MM3 (ref 4.14–5.8)
SODIUM SERPL-SCNC: 139 MMOL/L (ref 136–145)
TRIGL SERPL-MCNC: 74 MG/DL (ref 0–150)
TSH SERPL DL<=0.005 MIU/L-ACNC: 3 UIU/ML (ref 0.27–4.2)
VLDLC SERPL CALC-MCNC: 14 MG/DL (ref 5–40)
WBC # BLD AUTO: 4.32 10*3/MM3 (ref 3.4–10.8)

## 2023-06-04 PROBLEM — Z00.00 ANNUAL PHYSICAL EXAM: Status: ACTIVE | Noted: 2023-06-04

## 2023-07-27 RX ORDER — RIVAROXABAN 10 MG/1
TABLET, FILM COATED ORAL
Qty: 90 TABLET | Refills: 1 | Status: SHIPPED | OUTPATIENT
Start: 2023-07-27

## 2023-08-11 NOTE — PROGRESS NOTES
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  Patient has tolerated this well, no bleeding complications.  He did undergo an arthroscopic knee surgery on 7/11/2023, held Xarelto 48 hours prior to the procedure and resume the day after with no complications.  He is not aware of any additional upcoming procedures.  He was able to establish care with a new primary care physician Dr. Tyson.  He does report some chronic intermittent slight swelling in his lower legs.  He does not wear compression stockings routinely.      Objective   Vital Signs:   /71   Pulse 58   Temp 97.5 øF (36.4 øC) (Temporal)   Resp 18   Ht 183 cm (72.05\")   Wt 105 kg (230 lb 12.8 oz)   SpO2 99%   BMI 31.26 kg/mý     Physical Exam  Constitutional:       Appearance: He is well-developed.   Eyes:      Conjunctiva/sclera: Conjunctivae normal.   Neck:      Thyroid: No thyromegaly.   Cardiovascular:      Rate and Rhythm: Normal rate and regular rhythm.      Heart sounds: No murmur heard.    No friction rub. No gallop.   Pulmonary:      Effort: No respiratory distress.      Breath sounds: Normal breath sounds.   Abdominal:      General: Bowel sounds are normal. There is no distension.      Palpations: Abdomen is soft.      Tenderness: There is no abdominal tenderness.   Musculoskeletal:         General: Swelling present.      Comments: Trace bilateral lower extremity edema   Lymphadenopathy:      Head:      Right side of head: No submandibular adenopathy.      Cervical: No cervical adenopathy.      Upper Body:      Right upper body: No supraclavicular adenopathy.      Left upper body: No supraclavicular adenopathy.   Skin:     General: Skin is warm and dry.     "  Findings: No rash.   Neurological:      Mental Status: He is alert and oriented to person, place, and time.      Cranial Nerves: No cranial nerve deficit.      Motor: No abnormal muscle tone.      Deep Tendon Reflexes: Reflexes normal.   Psychiatric:         Behavior: Behavior normal.          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  Patient reports anticoagulation x3 to 4 months.  Acute left calf DVT 9/26/2018 while off anticoagulation, treated with Xarelto.  Follow-up Doppler 11/12/2018 with chronic right calf DVT, acute left calf DVT  Hypercoagulable evaluation 11/5/2018 with factor V Leiden heterozygosity, negative prothrombin gene mutation, protein C activity 112%, protein S free 104%, protein S activity 78%, negative anticardiolipin antibody panel, negative beta-2 GP 1 antibody panel, negative lupus anticoagulant.  Due to unprovoked recurrent DVT in the setting of factor V Leiden heterozygosity, recommended long-term anticoagulation with Xarelto.  Recommended long-term anticoagulation however patient felt to be a candidate to decrease from 20 mg down to 10 mg Xarelto daily.  Dose decreased on 7/25/2022.  Patient returns today in follow-up continuing on chronic anticoagulation with reduced dose Xarelto at 10 mg daily for maintenance therapy.  Patient is tolerating anticoagulation well, no bleeding complications.  He did undergo a left knee arthroscopic surgery on 7/11/2023, held Xarelto 48 hours prior to the procedure, resumed the day after and had no complications.  He is having some slight edema in his lower extremities related to venous stasis.  We discussed elevation and use of compression stockings and we are providing him with a new prescription today for Jobst stockings, encouraged him to wear these when he is up on his feet for longer  periods of time.  We will see him back in follow-up in 6 months.    *Prostate cancer  Status post prostatectomy in 2008  Patient continues routine follow-up on an annual basis with urology, PSA has been undetectable    *Health maintenance/PCP  Patient did establish care with a new PCP Dr. Tyson in May 2023.        Plan:  Continue Xarelto 10 mg daily intending long-term treatment.    New prescription provided today for bilateral lower extremity Jobst stockings knee-high 20-30 mmHg.  Patient encouraged to wear compression stockings when he is up on his feet for a longer period of time.  MD visit in 6 months with CBC, CMP

## 2023-08-14 ENCOUNTER — OFFICE VISIT (OUTPATIENT)
Dept: ONCOLOGY | Facility: CLINIC | Age: 63
End: 2023-08-14
Payer: COMMERCIAL

## 2023-08-14 ENCOUNTER — LAB (OUTPATIENT)
Dept: OTHER | Facility: HOSPITAL | Age: 63
End: 2023-08-14
Payer: COMMERCIAL

## 2023-08-14 VITALS
WEIGHT: 230.8 LBS | HEART RATE: 58 BPM | DIASTOLIC BLOOD PRESSURE: 71 MMHG | OXYGEN SATURATION: 99 % | TEMPERATURE: 97.5 F | SYSTOLIC BLOOD PRESSURE: 136 MMHG | RESPIRATION RATE: 18 BRPM | HEIGHT: 72 IN | BODY MASS INDEX: 31.26 KG/M2

## 2023-08-14 DIAGNOSIS — I82.409 RECURRENT DEEP VEIN THROMBOSIS (DVT): Primary | ICD-10-CM

## 2023-08-14 DIAGNOSIS — D68.51 FACTOR V LEIDEN MUTATION: ICD-10-CM

## 2023-08-14 DIAGNOSIS — I82.409 RECURRENT DEEP VEIN THROMBOSIS (DVT): ICD-10-CM

## 2023-08-14 LAB
ALBUMIN SERPL-MCNC: 4.7 G/DL (ref 3.5–5.2)
ALBUMIN/GLOB SERPL: 1.7 G/DL
ALP SERPL-CCNC: 70 U/L (ref 39–117)
ALT SERPL W P-5'-P-CCNC: 12 U/L (ref 1–41)
ANION GAP SERPL CALCULATED.3IONS-SCNC: 12.7 MMOL/L (ref 5–15)
AST SERPL-CCNC: 18 U/L (ref 1–40)
BASOPHILS # BLD AUTO: 0.03 10*3/MM3 (ref 0–0.2)
BASOPHILS NFR BLD AUTO: 0.5 % (ref 0–1.5)
BILIRUB SERPL-MCNC: 0.5 MG/DL (ref 0–1.2)
BUN SERPL-MCNC: 21 MG/DL (ref 8–23)
BUN/CREAT SERPL: 20.4 (ref 7–25)
CALCIUM SPEC-SCNC: 9.6 MG/DL (ref 8.6–10.5)
CHLORIDE SERPL-SCNC: 101 MMOL/L (ref 98–107)
CO2 SERPL-SCNC: 26.3 MMOL/L (ref 22–29)
CREAT SERPL-MCNC: 1.03 MG/DL (ref 0.76–1.27)
DEPRECATED RDW RBC AUTO: 41.7 FL (ref 37–54)
EGFRCR SERPLBLD CKD-EPI 2021: 82.6 ML/MIN/1.73
EOSINOPHIL # BLD AUTO: 0.06 10*3/MM3 (ref 0–0.4)
EOSINOPHIL NFR BLD AUTO: 0.9 % (ref 0.3–6.2)
ERYTHROCYTE [DISTWIDTH] IN BLOOD BY AUTOMATED COUNT: 12.5 % (ref 12.3–15.4)
GLOBULIN UR ELPH-MCNC: 2.8 GM/DL
GLUCOSE SERPL-MCNC: 138 MG/DL (ref 65–99)
HCT VFR BLD AUTO: 46.9 % (ref 37.5–51)
HGB BLD-MCNC: 15.1 G/DL (ref 13–17.7)
IMM GRANULOCYTES # BLD AUTO: 0.02 10*3/MM3 (ref 0–0.05)
IMM GRANULOCYTES NFR BLD AUTO: 0.3 % (ref 0–0.5)
LYMPHOCYTES # BLD AUTO: 1.6 10*3/MM3 (ref 0.7–3.1)
LYMPHOCYTES NFR BLD AUTO: 24.7 % (ref 19.6–45.3)
MCH RBC QN AUTO: 29.1 PG (ref 26.6–33)
MCHC RBC AUTO-ENTMCNC: 32.2 G/DL (ref 31.5–35.7)
MCV RBC AUTO: 90.4 FL (ref 79–97)
MONOCYTES # BLD AUTO: 0.64 10*3/MM3 (ref 0.1–0.9)
MONOCYTES NFR BLD AUTO: 9.9 % (ref 5–12)
NEUTROPHILS NFR BLD AUTO: 4.14 10*3/MM3 (ref 1.7–7)
NEUTROPHILS NFR BLD AUTO: 63.7 % (ref 42.7–76)
NRBC BLD AUTO-RTO: 0 /100 WBC (ref 0–0.2)
PLATELET # BLD AUTO: 258 10*3/MM3 (ref 140–450)
PMV BLD AUTO: 9.9 FL (ref 6–12)
POTASSIUM SERPL-SCNC: 4.1 MMOL/L (ref 3.5–5.2)
PROT SERPL-MCNC: 7.5 G/DL (ref 6–8.5)
RBC # BLD AUTO: 5.19 10*6/MM3 (ref 4.14–5.8)
SODIUM SERPL-SCNC: 140 MMOL/L (ref 136–145)
WBC NRBC COR # BLD: 6.49 10*3/MM3 (ref 3.4–10.8)

## 2023-08-14 PROCEDURE — 80053 COMPREHEN METABOLIC PANEL: CPT | Performed by: INTERNAL MEDICINE

## 2023-08-14 PROCEDURE — 85025 COMPLETE CBC W/AUTO DIFF WBC: CPT | Performed by: INTERNAL MEDICINE

## 2023-08-14 PROCEDURE — 36415 COLL VENOUS BLD VENIPUNCTURE: CPT

## 2023-08-14 PROCEDURE — 99214 OFFICE O/P EST MOD 30 MIN: CPT | Performed by: INTERNAL MEDICINE

## 2023-08-14 NOTE — LETTER
"August 14, 2023     Garcia Tyson Sr., MD  [address]  [address]  [place] [id number]    Patient: Ck Arreguin   YOB: 1960   Date of Visit: 8/14/2023     Dear Garcia Tyson Sr., MD:       Thank you for referring Ck Arreguin to me for evaluation. Below are the relevant portions of my assessment and plan of care.    If you have questions, please do not hesitate to call me. I look forward to following Ck along with you.         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  Patient has tolerated this well, no bleeding complications.  He did undergo an arthroscopic knee surgery on 7/11/2023, held Xarelto 48 hours prior to the procedure and resume the day after with no complications.  He is not aware of any additional upcoming procedures.  He was able to establish care with a new primary care physician Dr. Tyson.  He does report some chronic intermittent slight swelling in his lower legs.  He does not wear compression stockings routinely.      Objective   Vital Signs:   /71   Pulse 58   Temp 97.5 øF (36.4 øC) (Temporal)   Resp 18   Ht 183 cm (72.05\")   Wt 105 kg (230 lb 12.8 oz)   SpO2 99%   BMI 31.26 kg/mý     Physical Exam  Constitutional:       Appearance: He is well-developed.   Eyes:      Conjunctiva/sclera: Conjunctivae normal.   Neck:      Thyroid: No thyromegaly.   Cardiovascular:      Rate and Rhythm: Normal rate and regular rhythm.      Heart sounds: No murmur heard.    No friction rub. No gallop.   Pulmonary:      Effort: No respiratory distress.      Breath sounds: " Normal breath sounds.   Abdominal:      General: Bowel sounds are normal. There is no distension.      Palpations: Abdomen is soft.      Tenderness: There is no abdominal tenderness.   Musculoskeletal:         General: Swelling present.      Comments: Trace bilateral lower extremity edema   Lymphadenopathy:      Head:      Right side of head: No submandibular adenopathy.      Cervical: No cervical adenopathy.      Upper Body:      Right upper body: No supraclavicular adenopathy.      Left upper body: No supraclavicular adenopathy.   Skin:     General: Skin is warm and dry.      Findings: No rash.   Neurological:      Mental Status: He is alert and oriented to person, place, and time.      Cranial Nerves: No cranial nerve deficit.      Motor: No abnormal muscle tone.      Deep Tendon Reflexes: Reflexes normal.   Psychiatric:         Behavior: Behavior normal.          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  Patient reports anticoagulation x3 to 4 months.  Acute left calf DVT 9/26/2018 while off anticoagulation, treated with Xarelto.  Follow-up Doppler 11/12/2018 with chronic right calf DVT, acute left calf DVT  Hypercoagulable evaluation 11/5/2018 with factor V Leiden heterozygosity, negative prothrombin gene mutation, protein C activity 112%, protein S free 104%, protein S activity 78%, negative anticardiolipin antibody panel, negative beta-2 GP 1 antibody panel, negative lupus anticoagulant.  Due to unprovoked recurrent DVT in the setting of factor V Leiden heterozygosity, recommended long-term anticoagulation with Xarelto.  Recommended long-term anticoagulation however patient felt to be a candidate to decrease from 20 mg down to 10 mg Xarelto daily.  Dose decreased on 7/25/2022.  Patient returns today in follow-up continuing on chronic  anticoagulation with reduced dose Xarelto at 10 mg daily for maintenance therapy.  Patient is tolerating anticoagulation well, no bleeding complications.  He did undergo a left knee arthroscopic surgery on 7/11/2023, held Xarelto 48 hours prior to the procedure, resumed the day after and had no complications.  He is having some slight edema in his lower extremities related to venous stasis.  We discussed elevation and use of compression stockings and we are providing him with a new prescription today for Jobst stockings, encouraged him to wear these when he is up on his feet for longer periods of time.  We will see him back in follow-up in 6 months.    *Prostate cancer  Status post prostatectomy in 2008  Patient continues routine follow-up on an annual basis with urology, PSA has been undetectable    *Health maintenance/PCP  Patient did establish care with a new PCP Dr. Tyson in May 2023.        Plan:  Continue Xarelto 10 mg daily intending long-term treatment.    New prescription provided today for bilateral lower extremity Jobst stockings knee-high 20-30 mmHg.  Patient encouraged to wear compression stockings when he is up on his feet for a longer period of time.  MD visit in 6 months with CBC, CMP

## 2023-09-13 ENCOUNTER — TRANSCRIBE ORDERS (OUTPATIENT)
Dept: PHYSICAL THERAPY | Facility: CLINIC | Age: 63
End: 2023-09-13
Payer: COMMERCIAL

## 2023-09-13 DIAGNOSIS — M25.552 LEFT HIP PAIN: Primary | ICD-10-CM

## 2023-09-19 ENCOUNTER — TREATMENT (OUTPATIENT)
Dept: PHYSICAL THERAPY | Facility: CLINIC | Age: 63
End: 2023-09-19
Payer: COMMERCIAL

## 2023-09-19 DIAGNOSIS — M25.552 PAIN OF LEFT HIP: Primary | ICD-10-CM

## 2023-09-19 DIAGNOSIS — M54.50 BILATERAL LOW BACK PAIN, UNSPECIFIED CHRONICITY, UNSPECIFIED WHETHER SCIATICA PRESENT: ICD-10-CM

## 2023-09-19 DIAGNOSIS — Z74.09 IMPAIRED FUNCTIONAL MOBILITY, BALANCE, GAIT, AND ENDURANCE: ICD-10-CM

## 2023-09-19 NOTE — PROGRESS NOTES
Physical Therapy Initial Evaluation and Plan of Care    [address] [place] [address]  [phone]  Patient: Ck Arreguin   : 1960  Diagnosis/ICD-10 Code:  Pain of left hip [M25.552]  Referring practitioner: Derian Mack MD  Date of Initial Visit: 2023  Today's Date: 2023  Patient seen for 1 session         Visit Diagnoses:    ICD-10-CM ICD-9-CM   1. Pain of left hip  M25.552 719.45   2. Bilateral low back pain, unspecified chronicity, unspecified whether sciatica present  M54.50 724.2   3. Impaired functional mobility, balance, gait, and endurance  Z74.09 V49.89         Subjective Evaluation    History of Present Illness  Mechanism of injury: R knee scope in July for meniscus tear.  Left hip and low back started hurting about a month ago.  R knee doing fine. Pain on left side starts in back and then goes around to front of thigh but not below knee. Prior Dx of lumbar DDD and R pirif syndrome with some PT which helped.  Prostate CA 2008 - Ca free now.       Patient Occupation: manager - sit a lot Pain  Current pain rating: 3  At worst pain ratin  Location: low back, lat left hip and ant thigh  Quality: dull ache  Relieving factors: change in position, heat and ice  Aggravating factors: prolonged positioning and sleeping  Progression: improved    Social Support  Lives in: multiple-level home    Diagnostic Tests  MRI studies: abnormal (severe DDD with left lumbar stenosis)    Treatments  Previous treatment: physical therapy (PT for lumbar)  Current treatment: physical therapy  Patient Goals  Patient goals for therapy: decreased pain and increased motion           Objective          Postural Observations    Additional Postural Observation Details  Symmetrical but with increased thor kyphosis and dec lumbar lordosis    Neurological Testing     Sensation     Lumbar   Left   Intact: light touch    Right   Intact: light touch    Active Range of Motion     Lumbar   Flexion:  WFL  Extension: WFL  Left lateral flexion: WFL  Right lateral flexion: WFL  Left rotation: WFL  Right rotation: WFL  Left Hip   External rotation (90/90): 25 degrees   Internal rotation (90/90): 22 degrees     Right Hip   External rotation (90/90): 47 degrees   Internal rotation (90/90): 25 degrees     Additional Active Range of Motion Details  Tight all motions but no inc in sxs    Strength/Myotome Testing     Lumbar     Right   Normal strength    Left Hip   Planes of Motion   Flexion: 5  Abduction: 4+  Adduction: 5  External rotation: 4-  Internal rotation: 5    Left Knee   Flexion: 5  Extension: 4+    Left Ankle/Foot   Dorsiflexion: 4+  Great toe extension: 4+    Tests     Lumbar     Left   Negative passive SLR and quadrant.     Left Hip   Negative scour.     Additional Tests Details  + DEBBIE L for extreme tightness; negative PA glides for distal sxs    Lumbar Flexibility Comments:   Very tight pirif, also HS and hip flexors    Ambulation     Comments   Moderate antalgia LLE with c/o LBP        Assessment & Plan       Assessment  Impairments: abnormal gait, abnormal or restricted ROM, impaired physical strength and pain with function   Functional limitations: sleeping, walking and uncomfortable because of pain (Prolonged positioning)  Assessment details: Ck Arreguin is 62 y.o. male who presents today to Physical Therapy for recent worsening of chronic LBP and left hip following meniscus surgery on R knee.  Gait is moderately antalgic and also presents with notable limitations in left hip flexibility and also with LLE weakness.  Prior lumbar MRI indicates moderate-severe DDD/DJD. Patient would benefit from skilled physical therapy to address functional limitations and impairments to improve gait and quality of life.  Prognosis: good  Prognosis details: Good response to PT in past    Goals  Plan Goals: STGs (3 wks)    1. Patient demonstrates tolerance for and compliance with initial HEP and  precautions/restrictions  2. Patient reports pain decreased to 4/10 for improved ADLs  3. Patient reports decreased sxs into LLE by 50%  4. Review body mechanics with ADLs/work tasks  5. Decreased antalgia with ambulation    LTGs (8 wks or D/C)    1. Patient demonstrates independence with HEP, posturing and body mechanics for prevention  2. Reports decreased sleep disturbance to allow  6 hrs or greater  3. Patient reports decreased sxs into LLE by %  4.  Improved flexibility of LLE and strength increased by 1/2 grade at all deficits  5. Patient demonstrates ability to sit for 60 min and stand/walk 30 min to allow return to ADLs/work without restriction    Plan  Therapy options: will be seen for skilled therapy services  Planned modality interventions: cryotherapy, thermotherapy (hydrocollator packs) and electrical stimulation/Russian stimulation  Planned therapy interventions: manual therapy, soft tissue mobilization, stretching, therapeutic activities, strengthening, home exercise program, body mechanics training, neuromuscular re-education, abdominal trunk stabilization and functional ROM exercises  Frequency: 2x week  Duration in weeks: 8  Treatment plan discussed with: patient      History # of Personal Factors and/or Comorbidities: MODERATE (1-2)  Examination of Body System(s): # of elements: LOW (1-2)  Clinical Presentation: STABLE   Clinical Decision Making: LOW       Timed:         Manual Therapy:    0     mins  25641;     Therapeutic Exercise:    13     mins  24635;     Neuromuscular Jay:    0    mins  53290;    Therapeutic Activity:     15     mins  10874;     Gait Trainin     mins  04180;     Ultrasound:     0     mins  66964;    Ionto                               0    mins   30798  Self Care                       0     mins   12668      Un-Timed:  Electrical Stimulation:    0     mins  55723 ( );  Dry Needling     0     mins self-pay  Traction     0     mins 92403  Low Eval     18      Mins  14305  Mod Eval     0     Mins  52429  High Eval                       0     Mins  17049  Canalith Repos    0     mins 88472      Timed Treatment:   28   mins   Total Treatment:     46   mins          PT: Alaina Sen PT     License Number: [id number]  Electronically signed by Alaina Sen PT, 09/19/23, 11:19 AM EDT    Certification Period: 9/19/2023 thru 12/17/2023  I certify that the therapy services are furnished while this patient is under my care.  The services outlined above are required by this patient, and will be reviewed every 90 days.         Physician Signature:__________________________________________________    PHYSICIAN: Derian Mack MD  NPI: [id number]                                      DATE:      Please sign and return via fax to [email or web address] . Thank you, [facility].

## 2023-09-29 ENCOUNTER — TREATMENT (OUTPATIENT)
Dept: PHYSICAL THERAPY | Facility: CLINIC | Age: 63
End: 2023-09-29
Payer: COMMERCIAL

## 2023-09-29 DIAGNOSIS — M54.50 BILATERAL LOW BACK PAIN, UNSPECIFIED CHRONICITY, UNSPECIFIED WHETHER SCIATICA PRESENT: ICD-10-CM

## 2023-09-29 DIAGNOSIS — Z74.09 IMPAIRED FUNCTIONAL MOBILITY, BALANCE, GAIT, AND ENDURANCE: ICD-10-CM

## 2023-09-29 DIAGNOSIS — M25.552 PAIN OF LEFT HIP: Primary | ICD-10-CM

## 2023-09-29 PROCEDURE — 97110 THERAPEUTIC EXERCISES: CPT | Performed by: PHYSICAL THERAPIST

## 2023-09-29 PROCEDURE — 97140 MANUAL THERAPY 1/> REGIONS: CPT | Performed by: PHYSICAL THERAPIST

## 2023-09-29 NOTE — PROGRESS NOTES
Physical Therapy Daily Treatment Note  [facility]  [address], [address]  [place] [address]    Patient: Ck Arreguin   : 1960  Referring practitioner: Derian Mack MD  Today's Date: 2023  Patient seen for 2 sessions    Visit Diagnoses:    ICD-10-CM ICD-9-CM   1. Pain of left hip  M25.552 719.45   2. Bilateral low back pain, unspecified chronicity, unspecified whether sciatica present  M54.50 724.2   3. Impaired functional mobility, balance, gait, and endurance  Z74.09 V49.89       Subjective   Ck Arreguin reports: that his back and hip are getting better but still symptomatic.  Left hip feels limited in motion.  Left posterior hip and anterior thigh much less frequent.        Objective   Left hip restricted in all planes of motion except for adduction    See Exercise, Manual, and Modality Logs for complete treatment.     Patient Education: new stretches for ITB and hamstrings    Assessment/Plan  Patient with some decrease in pain since last appt.  Very tight left hip mobility which improved with manual therapy and self stretches.  Will cont to work mobility then add in more stablization activities     Progress strengthening /stabilization /functional activity           Timed:  Manual Therapy:    25     mins  59520;  Therapeutic Exercise:    15     mins  27857;     Neuromuscular Jay:    0    mins  66544;    Therapeutic Activity:     0     mins  90994;     Ultrasound:      0     mins  96359;    Iontophoresis              __0_   mins  Dry Needling               _____   mins        Untimed:  Electrical Stimulation:     0    mins  51191 ( );  Mechanical Traction:             mins  89274;   Paraffin                       _____  mins     Timed Treatment:   40   mins   Total Treatment:     45   mins    Kayleen Johnson PT  [place] License # [id number]  Physical Therapist    Electronically signed by Kayleen Johnson PT, 23, 8:36 AM EDT

## 2023-10-04 ENCOUNTER — TREATMENT (OUTPATIENT)
Dept: PHYSICAL THERAPY | Facility: CLINIC | Age: 63
End: 2023-10-04
Payer: COMMERCIAL

## 2023-10-04 DIAGNOSIS — M25.552 PAIN OF LEFT HIP: Primary | ICD-10-CM

## 2023-10-04 DIAGNOSIS — M54.50 BILATERAL LOW BACK PAIN, UNSPECIFIED CHRONICITY, UNSPECIFIED WHETHER SCIATICA PRESENT: ICD-10-CM

## 2023-10-04 DIAGNOSIS — Z74.09 IMPAIRED FUNCTIONAL MOBILITY, BALANCE, GAIT, AND ENDURANCE: ICD-10-CM

## 2023-10-04 NOTE — PROGRESS NOTES
Physical Therapy Daily Treatment Note  [facility] Therapy [facility]  [address], [address]  [place] [address]    Patient: Ck Arreguin   : 1960  Referring practitioner: Derian Mack MD  Today's Date: 10/4/2023  Patient seen for 3 sessions    Visit Diagnoses:    ICD-10-CM ICD-9-CM   1. Pain of left hip  M25.552 719.45   2. Bilateral low back pain, unspecified chronicity, unspecified whether sciatica present  M54.50 724.2   3. Impaired functional mobility, balance, gait, and endurance  Z74.09 V49.89       Subjective   Ck Arreguin reports: that he is feeling quite a bit better.  States that he is less stiff and has less pain in the left hip.  Occasional lower back pain with yard work.      Objective   Level pelvis    Tenderness in left posterior hip    See Exercise, Manual, and Modality Logs for complete treatment.     Patient Education:new pallof press and hip flexor stretches.  OK to use inversion table - discussed angles and time    Assessment/Plan  Ck fox is improving as his pain has decreased and his mobility has increased.  Able to do more yard work with less pain.  No signs of abnormal neural iinvolvement    Progress strengthening /stabilization /functional activity           Timed:  Manual Therapy:    10     mins  86634;  Therapeutic Exercise:    15/20     mins  08354;     Neuromuscular Jay:    0    mins  49450;    Therapeutic Activity:     10     mins  49344;     Ultrasound:      0     mins  43318;    Iontophoresis              __0_   mins  Dry Needling               _____   mins        Untimed:  Electrical Stimulation:     0    mins  10003 ( );  Mechanical Traction:             mins  30174;   Paraffin                       _____  mins     Timed Treatment:   35   mins   Total Treatment:     40   mins    Kayleen Johnson PT  [place] License # [id number]  Physical Therapist    Electronically signed by Kayleen Johnson PT, 10/04/23, 8:00 AM EDT

## 2023-10-06 ENCOUNTER — TREATMENT (OUTPATIENT)
Dept: PHYSICAL THERAPY | Facility: CLINIC | Age: 63
End: 2023-10-06
Payer: COMMERCIAL

## 2023-10-06 DIAGNOSIS — M25.552 PAIN OF LEFT HIP: Primary | ICD-10-CM

## 2023-10-06 DIAGNOSIS — Z74.09 IMPAIRED FUNCTIONAL MOBILITY, BALANCE, GAIT, AND ENDURANCE: ICD-10-CM

## 2023-10-06 DIAGNOSIS — M54.50 BILATERAL LOW BACK PAIN, UNSPECIFIED CHRONICITY, UNSPECIFIED WHETHER SCIATICA PRESENT: ICD-10-CM

## 2023-10-06 NOTE — PROGRESS NOTES
Physical Therapy Daily Treatment Note  [facility] Therapy [facility]  [address], [address]  [place] [address]    Patient: Ck Arreguin   : 1960  Referring practitioner: Derian Mack MD  Today's Date: 10/6/2023  Patient seen for 4 sessions    Visit Diagnoses:    ICD-10-CM ICD-9-CM   1. Pain of left hip  M25.552 719.45   2. Bilateral low back pain, unspecified chronicity, unspecified whether sciatica present  M54.50 724.2   3. Impaired functional mobility, balance, gait, and endurance  Z74.09 V49.89       Subjective   Ck Arreguin reports: that he continues to feel better.  Is 50% back to normal.  Still very tight without flexibility.  Has been a few months since he felt normal.  Feels good with the stretches.  Sleeping better and walking better now.      Objective   Presents with slightly antalgic gait pattern on the left, early heel off    See Exercise, Manual, and Modality Logs for complete treatment.     Patient Education: possible use of standing desk at home on days that he has quite a bit of desk work to do    Assessment/Plan  Very tight left hip capsule and posterior hip mm.  Responds well to manual therapy and is compliant with his HEP.  Much improved activity tolerances since starting therapy    Progress per Plan of Care           Timed:  Manual Therapy:    20     mins  13821;  Therapeutic Exercise:    10/20     mins  24211;     Neuromuscular Jay:    0    mins  39287;    Therapeutic Activity:     10     mins  51376;     Ultrasound:      0     mins  02196;    Iontophoresis              __0_   mins  Dry Needling               _____   mins        Untimed:  Electrical Stimulation:     0    mins  65215 ( );  Mechanical Traction:             mins  81057;   Paraffin                       _____  mins     Timed Treatment:   40   mins   Total Treatment:     55   mins    Kayleen Johnson, PT  [place] License # [id number]  Physical Therapist    Electronically signed by Kayleen VALENZUELA  Alex PT, 10/06/23, 1:08 PM EDT

## 2023-10-10 ENCOUNTER — TELEPHONE (OUTPATIENT)
Dept: PHYSICAL THERAPY | Facility: OTHER | Age: 63
End: 2023-10-10
Payer: COMMERCIAL

## 2023-10-10 ENCOUNTER — TREATMENT (OUTPATIENT)
Dept: PHYSICAL THERAPY | Facility: CLINIC | Age: 63
End: 2023-10-10
Payer: COMMERCIAL

## 2023-10-10 DIAGNOSIS — M54.50 BILATERAL LOW BACK PAIN, UNSPECIFIED CHRONICITY, UNSPECIFIED WHETHER SCIATICA PRESENT: ICD-10-CM

## 2023-10-10 DIAGNOSIS — M25.552 PAIN OF LEFT HIP: Primary | ICD-10-CM

## 2023-10-10 DIAGNOSIS — Z74.09 IMPAIRED FUNCTIONAL MOBILITY, BALANCE, GAIT, AND ENDURANCE: ICD-10-CM

## 2023-10-10 PROCEDURE — 97140 MANUAL THERAPY 1/> REGIONS: CPT | Performed by: PHYSICAL THERAPIST

## 2023-10-10 PROCEDURE — 97110 THERAPEUTIC EXERCISES: CPT | Performed by: PHYSICAL THERAPIST

## 2023-10-10 NOTE — PROGRESS NOTES
Physical Therapy Daily Treatment Note  [facility] Therapy [facility]  [address], [address]  [place] [address]    Patient: Ck Arreguin   : 1960  Referring practitioner: Derian Mack MD  Today's Date: 10/10/2023  Patient seen for 5 sessions    Visit Diagnoses:    ICD-10-CM ICD-9-CM   1. Pain of left hip  M25.552 719.45   2. Bilateral low back pain, unspecified chronicity, unspecified whether sciatica present  M54.50 724.2   3. Impaired functional mobility, balance, gait, and endurance  Z74.09 V49.89       Subjective   Ck Arreguin reports: that he is much more sore today as he over did it this weekend.  States that he had to stand for a long period on Saturday and did some heavy yard work on  which seemed to aggravate both the lower back and hip.  Pain 4-5/10 yesterday but down to 2/10 today.       Objective   Presents with level pelvis, slightly antalgic gait on the left    Spinal flexion 100%, extension 75% with slight central lumbar pain    Due to antalgic gait pattern we did not perfomre WB activities today    See Exercise, Manual, and Modality Logs for complete treatment.     Patient Education:added child's pose exercise to HEP    Assessment/Plan  Patient with increase in symptoms after very active weekend requiring prolonged standing and some bending as he cut down another tree.  Less pain after treatment.      Progress strengthening /stabilization /functional activity           Timed:  Manual Therapy:    25     mins  53617;  Therapeutic Exercise:    15     mins  17773;     Neuromuscular Jay:    0    mins  19949;    Therapeutic Activity:     0     mins  68575;     Ultrasound:      0     mins  36269;    Iontophoresis              __0_   mins  Dry Needling               _____   mins        Untimed:  Electrical Stimulation:     0    mins  77170 ( );  Mechanical Traction:             mins  66948;   Paraffin                       _____  mins     Timed  Treatment:   40   mins   Total Treatment:     40   mins    Kayleen Johnson PT  [place] License # [id number]  Physical Therapist    Electronically signed by Kayleen Johnson PT, 10/10/23, 11:02 AM EDT

## 2023-10-10 NOTE — TELEPHONE ENCOUNTER
Caller: Ck Arreguin    Relationship: Self    What was the call regarding: PATIENT CANCELLED APPT TODAY BECAUSE THEY CANT MAKE IT

## 2023-10-13 ENCOUNTER — TREATMENT (OUTPATIENT)
Dept: PHYSICAL THERAPY | Facility: CLINIC | Age: 63
End: 2023-10-13
Payer: COMMERCIAL

## 2023-10-13 DIAGNOSIS — M54.50 BILATERAL LOW BACK PAIN, UNSPECIFIED CHRONICITY, UNSPECIFIED WHETHER SCIATICA PRESENT: ICD-10-CM

## 2023-10-13 DIAGNOSIS — M25.552 PAIN OF LEFT HIP: Primary | ICD-10-CM

## 2023-10-13 DIAGNOSIS — Z74.09 IMPAIRED FUNCTIONAL MOBILITY, BALANCE, GAIT, AND ENDURANCE: ICD-10-CM

## 2023-10-13 NOTE — PROGRESS NOTES
Physical Therapy Daily Treatment Note  [facility] Physical Therapy [facility]  [address], [address]  [place] [address]    Patient: Ck Arreguin   : 1960  Referring practitioner: Derian Mack MD  Today's Date: 10/13/2023  Patient seen for 6 sessions    Visit Diagnoses:    ICD-10-CM ICD-9-CM   1. Pain of left hip  M25.552 719.45   2. Bilateral low back pain, unspecified chronicity, unspecified whether sciatica present  M54.50 724.2   3. Impaired functional mobility, balance, gait, and endurance  Z74.09 V49.89       Subjective   Ck Arreguin reports: that his back is feeling quite a bit better today.  Has some central back ache but no true pain.  Has not started the steroids yet.        Objective   Presents with fluid gait pattern today compared to antalgic pattern last visit    See Exercise, Manual, and Modality Logs for complete treatment.     Patient Education: cont HEP    Assessment/Plan  Patient with much improved hip flexibility today and decreased back and leg pain.  Still with some tightenss in IR.  Suspect some lumbar involvement as standing is so helpful in symptom management    Progress strengthening /stabilization /functional activity           Timed:  Manual Therapy:    13     mins  47401;  Therapeutic Exercise:    25/40     mins  67348;     Neuromuscular Jay:    0    mins  84693;    Therapeutic Activity:     10     mins  70177;     Ultrasound:      0     mins  10034;    Iontophoresis              __0_   mins  Dry Needling               _____   mins        Untimed:  Electrical Stimulation:     0    mins  03654 ( );  Mechanical Traction:             mins  46027;   Paraffin                       _____  mins     Timed Treatment:   48   mins   Total Treatment:     60   mins    Kayleen Johnson PT  [place] License # [id number]  Physical Therapist    Electronically signed by Kayleen Johnson PT, 10/13/23, 8:06 AM EDT

## 2023-10-17 ENCOUNTER — TREATMENT (OUTPATIENT)
Dept: PHYSICAL THERAPY | Facility: CLINIC | Age: 63
End: 2023-10-17
Payer: COMMERCIAL

## 2023-10-17 DIAGNOSIS — M54.50 BILATERAL LOW BACK PAIN, UNSPECIFIED CHRONICITY, UNSPECIFIED WHETHER SCIATICA PRESENT: ICD-10-CM

## 2023-10-17 DIAGNOSIS — M25.552 PAIN OF LEFT HIP: Primary | ICD-10-CM

## 2023-10-17 DIAGNOSIS — Z74.09 IMPAIRED FUNCTIONAL MOBILITY, BALANCE, GAIT, AND ENDURANCE: ICD-10-CM

## 2023-10-17 PROCEDURE — 97140 MANUAL THERAPY 1/> REGIONS: CPT | Performed by: PHYSICAL THERAPIST

## 2023-10-17 PROCEDURE — 97110 THERAPEUTIC EXERCISES: CPT | Performed by: PHYSICAL THERAPIST

## 2023-10-20 ENCOUNTER — TREATMENT (OUTPATIENT)
Dept: PHYSICAL THERAPY | Facility: CLINIC | Age: 63
End: 2023-10-20
Payer: COMMERCIAL

## 2023-10-20 DIAGNOSIS — M25.552 PAIN OF LEFT HIP: Primary | ICD-10-CM

## 2023-10-20 DIAGNOSIS — M54.50 BILATERAL LOW BACK PAIN, UNSPECIFIED CHRONICITY, UNSPECIFIED WHETHER SCIATICA PRESENT: ICD-10-CM

## 2023-10-20 DIAGNOSIS — Z74.09 IMPAIRED FUNCTIONAL MOBILITY, BALANCE, GAIT, AND ENDURANCE: ICD-10-CM

## 2023-10-20 PROCEDURE — 97110 THERAPEUTIC EXERCISES: CPT | Performed by: PHYSICAL THERAPIST

## 2023-10-20 PROCEDURE — 97140 MANUAL THERAPY 1/> REGIONS: CPT | Performed by: PHYSICAL THERAPIST

## 2023-10-20 NOTE — PROGRESS NOTES
Physical Therapy Daily Treatment Note  [facility] Physical Therapy [facility]  [address], [address]  [place] [address]    Patient: Ck Arreguin   : 1960  Referring practitioner: Derian Mack MD  Today's Date: 10/20/2023  Patient seen for 8 sessions    Visit Diagnoses:    ICD-10-CM ICD-9-CM   1. Pain of left hip  M25.552 719.45   2. Bilateral low back pain, unspecified chronicity, unspecified whether sciatica present  M54.50 724.2   3. Impaired functional mobility, balance, gait, and endurance  Z74.09 V49.89       Subjective   Ck Arreguin reports: that he is feeling quite a biot better.  Has been able to sit for longer periods of time driving without pain.  Hip symptoms are resolving.  Still with some central lower back strain. No leg pain or tire hip pain.  Did shovel some rock yesterday with minimal soreness afterwards      Objective   Presents with normal gait pattern    Minimal increase in pain with PA glide of L4 spinous process    See Exercise, Manual, and Modality Logs for complete treatment.     Patient Education: discussed use of his home inversion table.  Increase time and decrease angle for more benefit    Assessment/Plan  Patient with much less leg and hip pain despite increased activity at home.  Has some central spinal pain which is relieved with spinal decompression.  Has Inversion table at home and has been instructed to use it     Anticipate DC next Visit           Timed:  Manual Therapy:    25     mins  77406;  Therapeutic Exercise:    10     mins  40412;     Neuromuscular Jay:    0    mins  65883;    Therapeutic Activity:     0     mins  23470;     Ultrasound:      0     mins  85398;    Iontophoresis              __0_   mins  Dry Needling               _____   mins        Untimed:  Electrical Stimulation:     0    mins  56450 ( );  Mechanical Traction:             mins  88337;   Paraffin                       _____  mins     Timed Treatment:   35   mins    Total Treatment:     40   mins    Kayleen Johnson, PT  [place] License # [id number]  Physical Therapist    Electronically signed by Kayleen Johnson, PT, 10/20/23, 11:01 AM EDT

## 2023-10-24 ENCOUNTER — TREATMENT (OUTPATIENT)
Dept: PHYSICAL THERAPY | Facility: CLINIC | Age: 63
End: 2023-10-24
Payer: COMMERCIAL

## 2023-10-24 DIAGNOSIS — M54.50 BILATERAL LOW BACK PAIN, UNSPECIFIED CHRONICITY, UNSPECIFIED WHETHER SCIATICA PRESENT: ICD-10-CM

## 2023-10-24 DIAGNOSIS — M25.552 PAIN OF LEFT HIP: Primary | ICD-10-CM

## 2023-10-24 DIAGNOSIS — Z74.09 IMPAIRED FUNCTIONAL MOBILITY, BALANCE, GAIT, AND ENDURANCE: ICD-10-CM

## 2023-10-24 NOTE — PROGRESS NOTES
Physical Therapy Daily Treatment Note  [facility] Therapy - [facility]  [address], [address]  [place] [address]    Patient: Ck Arreguin   : 1960  Referring practitioner: Derian Mack MD  Today's Date: 10/24/2023  Patient seen for 9 sessions    Visit Diagnoses:    ICD-10-CM ICD-9-CM   1. Pain of left hip  M25.552 719.45   2. Bilateral low back pain, unspecified chronicity, unspecified whether sciatica present  M54.50 724.2   3. Impaired functional mobility, balance, gait, and endurance  Z74.09 V49.89       Subjective   Ck Arreguin reports: that he continues to feel better.  Was able to shovel quite a bit of rock this weekend and had no pain with it.  No central pain in the back or lateral hip pain.        Objective   Spinal AROM - full in all planes except for flexion and extension which were both limited by tightness not pain    Palpation - no palpable tenderness in the lumbar or right hip region    See Exercise, Manual, and Modality Logs for complete treatment.     Patient Education: cont stretches    Assessment/Plan    Patient has demonstrated significant  improvement since the initiation of therapy.  The pain has  nearly resolved.  The motion has increased.  The activity tolerances have increased to desired levels. I feel that the patient would benefit from continuing his HEP but stopping formal therapy.  As all goals have been met will DC PT    DC PT to HEP           Timed:  Manual Therapy:    15     mins  80486;  Therapeutic Exercise:    10/20     mins  86279;     Neuromuscular Jay:    0    mins  23472;    Therapeutic Activity:     10     mins  76920;     Ultrasound:      0     mins  16641;    Iontophoresis              __0_   mins  Dry Needling               _____   mins        Untimed:  Electrical Stimulation:     0    mins  56849 ( );  Mechanical Traction:             mins  15594;   Paraffin                       _____  mins     Timed Treatment:   35   mins    Total Treatment:     50   mins    Kayleen Johnson, PT  [place] License # [id number]  Physical Therapist    Electronically signed by Kayleen Johnson, PT, 10/24/23, 4:38 PM EDT

## 2024-01-23 ENCOUNTER — TELEPHONE (OUTPATIENT)
Dept: FAMILY MEDICINE CLINIC | Facility: CLINIC | Age: 64
End: 2024-01-23
Payer: COMMERCIAL

## 2024-01-23 DIAGNOSIS — Z13.6 ENCOUNTER FOR SCREENING FOR VASCULAR DISEASE: Primary | ICD-10-CM

## 2024-01-23 NOTE — TELEPHONE ENCOUNTER
Caller: Xi Arreguin    Relationship: Emergency Contact    Best call back number: [phone]     What orders are you requesting (i.e. lab or imaging): VASCULAR SCREENING    In what timeframe would the patient need to come in: ASAP    Where will you receive your lab/imaging services: [facility]    Additional notes: PLEASE ADVISE-PATIENT WAS ADVISED THEY HAVE TO GET AN ORDER FOR THIS NOW.

## 2024-01-31 RX ORDER — RIVAROXABAN 10 MG/1
TABLET, FILM COATED ORAL
Qty: 90 TABLET | Refills: 1 | Status: SHIPPED | OUTPATIENT
Start: 2024-01-31

## 2024-02-12 ENCOUNTER — OFFICE VISIT (OUTPATIENT)
Dept: ONCOLOGY | Facility: CLINIC | Age: 64
End: 2024-02-12
Payer: COMMERCIAL

## 2024-02-12 ENCOUNTER — LAB (OUTPATIENT)
Dept: OTHER | Facility: HOSPITAL | Age: 64
End: 2024-02-12
Payer: COMMERCIAL

## 2024-02-12 VITALS
OXYGEN SATURATION: 96 % | HEART RATE: 75 BPM | WEIGHT: 233.5 LBS | RESPIRATION RATE: 16 BRPM | TEMPERATURE: 98.4 F | SYSTOLIC BLOOD PRESSURE: 110 MMHG | HEIGHT: 72 IN | BODY MASS INDEX: 31.63 KG/M2 | DIASTOLIC BLOOD PRESSURE: 64 MMHG

## 2024-02-12 DIAGNOSIS — I82.409 RECURRENT DEEP VEIN THROMBOSIS (DVT): ICD-10-CM

## 2024-02-12 DIAGNOSIS — I82.409 RECURRENT DEEP VEIN THROMBOSIS (DVT): Primary | ICD-10-CM

## 2024-02-12 LAB
ALBUMIN SERPL-MCNC: 4.4 G/DL (ref 3.5–5.2)
ALBUMIN/GLOB SERPL: 1.6 G/DL
ALP SERPL-CCNC: 67 U/L (ref 39–117)
ALT SERPL W P-5'-P-CCNC: 12 U/L (ref 1–41)
ANION GAP SERPL CALCULATED.3IONS-SCNC: 8.1 MMOL/L (ref 5–15)
AST SERPL-CCNC: 17 U/L (ref 1–40)
BASOPHILS # BLD AUTO: 0.04 10*3/MM3 (ref 0–0.2)
BASOPHILS NFR BLD AUTO: 0.7 % (ref 0–1.5)
BILIRUB SERPL-MCNC: 0.5 MG/DL (ref 0–1.2)
BUN SERPL-MCNC: 20 MG/DL (ref 8–23)
BUN/CREAT SERPL: 20 (ref 7–25)
CALCIUM SPEC-SCNC: 9.4 MG/DL (ref 8.6–10.5)
CHLORIDE SERPL-SCNC: 101 MMOL/L (ref 98–107)
CO2 SERPL-SCNC: 28.9 MMOL/L (ref 22–29)
CREAT SERPL-MCNC: 1 MG/DL (ref 0.76–1.27)
DEPRECATED RDW RBC AUTO: 39.1 FL (ref 37–54)
EGFRCR SERPLBLD CKD-EPI 2021: 85.1 ML/MIN/1.73
EOSINOPHIL # BLD AUTO: 0.08 10*3/MM3 (ref 0–0.4)
EOSINOPHIL NFR BLD AUTO: 1.3 % (ref 0.3–6.2)
ERYTHROCYTE [DISTWIDTH] IN BLOOD BY AUTOMATED COUNT: 12 % (ref 12.3–15.4)
GLOBULIN UR ELPH-MCNC: 2.8 GM/DL
GLUCOSE SERPL-MCNC: 115 MG/DL (ref 65–99)
HCT VFR BLD AUTO: 45.6 % (ref 37.5–51)
HGB BLD-MCNC: 14.8 G/DL (ref 13–17.7)
IMM GRANULOCYTES # BLD AUTO: 0.01 10*3/MM3 (ref 0–0.05)
IMM GRANULOCYTES NFR BLD AUTO: 0.2 % (ref 0–0.5)
LYMPHOCYTES # BLD AUTO: 1.5 10*3/MM3 (ref 0.7–3.1)
LYMPHOCYTES NFR BLD AUTO: 24.5 % (ref 19.6–45.3)
MCH RBC QN AUTO: 29 PG (ref 26.6–33)
MCHC RBC AUTO-ENTMCNC: 32.5 G/DL (ref 31.5–35.7)
MCV RBC AUTO: 89.4 FL (ref 79–97)
MONOCYTES # BLD AUTO: 0.58 10*3/MM3 (ref 0.1–0.9)
MONOCYTES NFR BLD AUTO: 9.5 % (ref 5–12)
NEUTROPHILS NFR BLD AUTO: 3.91 10*3/MM3 (ref 1.7–7)
NEUTROPHILS NFR BLD AUTO: 63.8 % (ref 42.7–76)
NRBC BLD AUTO-RTO: 0 /100 WBC (ref 0–0.2)
PLATELET # BLD AUTO: 252 10*3/MM3 (ref 140–450)
PMV BLD AUTO: 9.8 FL (ref 6–12)
POTASSIUM SERPL-SCNC: 4.2 MMOL/L (ref 3.5–5.2)
PROT SERPL-MCNC: 7.2 G/DL (ref 6–8.5)
RBC # BLD AUTO: 5.1 10*6/MM3 (ref 4.14–5.8)
SODIUM SERPL-SCNC: 138 MMOL/L (ref 136–145)
WBC NRBC COR # BLD AUTO: 6.12 10*3/MM3 (ref 3.4–10.8)

## 2024-02-12 PROCEDURE — 85025 COMPLETE CBC W/AUTO DIFF WBC: CPT | Performed by: INTERNAL MEDICINE

## 2024-02-12 PROCEDURE — 80053 COMPREHEN METABOLIC PANEL: CPT | Performed by: INTERNAL MEDICINE

## 2024-02-12 PROCEDURE — 99214 OFFICE O/P EST MOD 30 MIN: CPT | Performed by: INTERNAL MEDICINE

## 2024-02-12 PROCEDURE — 36415 COLL VENOUS BLD VENIPUNCTURE: CPT

## 2024-08-05 RX ORDER — RIVAROXABAN 10 MG/1
TABLET, FILM COATED ORAL
Qty: 90 TABLET | Refills: 1 | Status: SHIPPED | OUTPATIENT
Start: 2024-08-05

## 2024-08-15 ENCOUNTER — OFFICE VISIT (OUTPATIENT)
Dept: FAMILY MEDICINE CLINIC | Facility: CLINIC | Age: 64
End: 2024-08-15
Payer: COMMERCIAL

## 2024-08-15 VITALS
DIASTOLIC BLOOD PRESSURE: 64 MMHG | WEIGHT: 232 LBS | OXYGEN SATURATION: 97 % | SYSTOLIC BLOOD PRESSURE: 130 MMHG | HEIGHT: 72 IN | BODY MASS INDEX: 31.42 KG/M2 | HEART RATE: 56 BPM | RESPIRATION RATE: 17 BRPM

## 2024-08-15 DIAGNOSIS — K21.9 GASTROESOPHAGEAL REFLUX DISEASE WITHOUT ESOPHAGITIS: Primary | ICD-10-CM

## 2024-08-15 PROCEDURE — 99213 OFFICE O/P EST LOW 20 MIN: CPT | Performed by: FAMILY MEDICINE

## 2024-08-15 RX ORDER — OMEPRAZOLE 40 MG/1
40 CAPSULE, DELAYED RELEASE ORAL DAILY
Qty: 30 CAPSULE | Refills: 3 | Status: SHIPPED | OUTPATIENT
Start: 2024-08-15

## 2024-08-15 NOTE — PROGRESS NOTES
Chief Complaint  Chief Complaint   Patient presents with    Abdominal Pain     Pt c/o stomach pain x 1 day        Subjective    History of Present Illness        Ck Arreguin presents to [facility] for   History of Present Illness  The patient is a 63-year-old male who presents for evaluation of stomach pain. He is accompanied by an adult female.    Yesterday, he experienced gallbladder discomfort following a meal of chicken, French fries, and green beans. The pain, described as a feeling of fullness and pressure, began in the afternoon. He managed the discomfort with Tums, which provided some relief. Currently, he reports a slight lingering sensation of light pressure. His lunch today, consisting of soup, did not cause any discomfort.    He has a history of similar episodes, with one occurring five years ago that resulted in an emergency room visit due to vomiting. Another episode occurred in April 2024, again accompanied by vomiting. He notes that vomiting seems to alleviate his symptoms. The first episode was triggered by consuming a large quantity of chips and dip, leading to bloating. He also experienced bloating yesterday. During these episodes, he feels significant pressure and discomfort, to the point where he cannot lie down or sit comfortably. Walking around seems to help, and vomiting provides relief.    He reports no acid reflux but admits to having difficulty eating and sleeping during these episodes. He typically goes to bed between 10 PM and midnight.  Abdominal Pain  This is a recurrent problem. The current episode started more than 1 year ago. The onset quality is gradual. The problem occurs intermittently. The most recent episode lasted 4 hours. The problem has been improving. The pain is located in the epigastric region. The pain is at a severity of 6/10. The quality of the pain is a sensation of fullness. The abdominal pain does not radiate. Associated symptoms  "include belching. Pertinent negatives include no arthralgias, constipation, diarrhea, dysuria, fever, flatus, frequency, hematochezia, hematuria, melena, myalgias, nausea, vomiting or weight loss. The pain is aggravated by certain positions.      History of Present Illness      Objective   Vital Signs:   Visit Vitals  /64   Pulse 56   Resp 17   Ht 183 cm (72.05\")   Wt 105 kg (232 lb)   SpO2 97%   BMI 31.42 kg/m²                Physical Exam  Vitals reviewed.   Constitutional:       Appearance: He is well-developed.   HENT:      Head: Normocephalic.      Right Ear: External ear normal.      Left Ear: External ear normal.      Nose: Nose normal.   Eyes:      Conjunctiva/sclera: Conjunctivae normal.   Cardiovascular:      Rate and Rhythm: Normal rate and regular rhythm.   Pulmonary:      Effort: Pulmonary effort is normal.      Breath sounds: Normal breath sounds.   Musculoskeletal:         General: Normal range of motion.      Cervical back: Normal range of motion and neck supple.   Skin:     General: Skin is warm and dry.      Capillary Refill: Capillary refill takes less than 2 seconds.   Neurological:      Mental Status: He is alert and oriented to person, place, and time.        Physical Exam           Result Review :  Results                            Assessment and Plan      Diagnoses and all orders for this visit:    1. Gastroesophageal reflux disease without esophagitis (Primary)  Assessment & Plan:  The symptoms, including bloating and relief after vomiting, suggest acid reflux rather than a gallbladder issue. The pain is located in the center, which is more indicative of epigastric pain related to acid reflux. Prilosec 40 mg was prescribed to be taken in the morning. He was advised to avoid eating late at night and to avoid trigger foods. A referral to a gastroenterologist was made for further evaluation.    Orders:  -     omeprazole (priLOSEC) 40 MG capsule; Take 1 capsule by mouth Daily.  Dispense: " 30 capsule; Refill: 3  -     Ambulatory Referral to Gastroenterology       Assessment & Plan             Follow Up   No follow-ups on file.  Patient was given instructions and counseling regarding his condition or for health maintenance advice. Please see specific information pulled into the AVS if appropriate.     Patient or patient representative verbalized consent for the use of Ambient Listening during the visit with  Garcia Tyson Sr, MD for chart documentation. 9/1/2024  14:59 EDT  Answers submitted by the patient for this visit:  Primary Reason for Visit (Submitted on 8/15/2024)  What is the primary reason for your visit?: Abdominal Pain

## 2024-09-01 NOTE — ASSESSMENT & PLAN NOTE
The symptoms, including bloating and relief after vomiting, suggest acid reflux rather than a gallbladder issue. The pain is located in the center, which is more indicative of epigastric pain related to acid reflux. Prilosec 40 mg was prescribed to be taken in the morning. He was advised to avoid eating late at night and to avoid trigger foods. A referral to a gastroenterologist was made for further evaluation.

## 2024-10-27 NOTE — PROGRESS NOTES
MILTON doppler completed. Prelim is pos for acute lt calf vein thrombus called to Dr. Bee at 1800. Then Dr. Bee wanted to speak to pt, transferred call to pt's room then pt was released.   Beni Mancia PharmD  10/27/2024  09:14 EDT

## 2024-11-10 DIAGNOSIS — K21.9 GASTROESOPHAGEAL REFLUX DISEASE WITHOUT ESOPHAGITIS: ICD-10-CM

## 2024-11-11 DIAGNOSIS — K21.9 GASTROESOPHAGEAL REFLUX DISEASE WITHOUT ESOPHAGITIS: ICD-10-CM

## 2024-11-11 RX ORDER — OMEPRAZOLE 40 MG/1
40 CAPSULE, DELAYED RELEASE ORAL DAILY
Qty: 90 CAPSULE | Refills: 1 | Status: CANCELLED | OUTPATIENT
Start: 2024-11-11

## 2024-11-11 RX ORDER — OMEPRAZOLE 40 MG/1
40 CAPSULE, DELAYED RELEASE ORAL DAILY
Qty: 90 CAPSULE | Refills: 1 | Status: SHIPPED | OUTPATIENT
Start: 2024-11-11

## 2024-11-11 NOTE — TELEPHONE ENCOUNTER
Caller: Ck Arreguin    Relationship: Self    Best call back number: [phone]     Requested Prescriptions:   Requested Prescriptions     Pending Prescriptions Disp Refills    omeprazole (priLOSEC) 40 MG capsule 90 capsule 1     Sig: Take 1 capsule by mouth Daily.        Pharmacy where request should be sent: [id number] [address] AT [facility] [phone] [facility] [phone] FX     Last office visit with prescribing clinician: 8/15/2024   Last telemedicine visit with prescribing clinician: Visit date not found   Next office visit with prescribing clinician: Visit date not found         Does the patient have less than a 3 day supply:  [x] Yes  [] No    Would you like a call back once the refill request has been completed: [] Yes [] No    If the office needs to give you a call back, can they leave a voicemail: [] Yes [] No    Krish Ornelas Rep   11/11/24 14:07 EST

## 2025-02-03 RX ORDER — RIVAROXABAN 10 MG/1
TABLET, FILM COATED ORAL
Qty: 90 TABLET | Refills: 1 | Status: SHIPPED | OUTPATIENT
Start: 2025-02-03

## 2025-03-06 ENCOUNTER — TRANSCRIBE ORDERS (OUTPATIENT)
Dept: PHYSICAL THERAPY | Facility: CLINIC | Age: 65
End: 2025-03-06
Payer: COMMERCIAL

## 2025-03-06 DIAGNOSIS — M25.561 RIGHT KNEE PAIN, UNSPECIFIED CHRONICITY: Primary | ICD-10-CM

## 2025-03-07 ENCOUNTER — TELEPHONE (OUTPATIENT)
Dept: ONCOLOGY | Facility: CLINIC | Age: 65
End: 2025-03-07
Payer: COMMERCIAL

## 2025-03-07 NOTE — TELEPHONE ENCOUNTER
Caller: Ck Arreguin    Relationship to patient: Self    Best call back number: [phone]     Chief complaint: PT NEEDS TO R/S    Type of visit: LAB AND FOLLOW UP     Requested date: FIRST AVAILABLE. WOULD LIKE TO GET IN NEXT WEEK BECAUSE [place] ALL NEXT W[place].      If rescheduling, when is the original appointment: 10/07/25           .”

## 2025-03-10 ENCOUNTER — TREATMENT (OUTPATIENT)
Dept: PHYSICAL THERAPY | Facility: CLINIC | Age: 65
End: 2025-03-10
Payer: COMMERCIAL

## 2025-03-10 DIAGNOSIS — S83.281D TEAR OF LATERAL MENISCUS OF RIGHT KNEE, UNSPECIFIED TEAR TYPE, UNSPECIFIED WHETHER OLD OR CURRENT TEAR, SUBSEQUENT ENCOUNTER: ICD-10-CM

## 2025-03-10 DIAGNOSIS — S83.241D TEAR OF MEDIAL MENISCUS OF RIGHT KNEE, UNSPECIFIED TEAR TYPE, UNSPECIFIED WHETHER OLD OR CURRENT TEAR, SUBSEQUENT ENCOUNTER: ICD-10-CM

## 2025-03-10 DIAGNOSIS — M94.261 CHONDROMALACIA OF KNEE, RIGHT: ICD-10-CM

## 2025-03-10 DIAGNOSIS — M25.561 RIGHT KNEE PAIN, UNSPECIFIED CHRONICITY: Primary | ICD-10-CM

## 2025-03-10 PROCEDURE — 97110 THERAPEUTIC EXERCISES: CPT | Performed by: PHYSICAL THERAPIST

## 2025-03-10 PROCEDURE — 97161 PT EVAL LOW COMPLEX 20 MIN: CPT | Performed by: PHYSICAL THERAPIST

## 2025-03-10 NOTE — PROGRESS NOTES
Physical Therapy Initial Evaluation and Plan of Care      Patient: Ck Arreguin   : 1960  Diagnosis/ICD-10 Code:  Right knee pain, unspecified chronicity [M25.561]  Referring practitioner: Derian Mack MD  Date of Initial Visit: 3/10/2025  Today's Date: 3/10/2025  Patient seen for 1 sessions         Visit Diagnoses:     ICD-10-CM ICD-9-CM   1. Right knee pain, unspecified chronicity  M25.561 719.46   2. Tear of lateral meniscus of right knee, unspecified tear type, unspecified whether old or current tear, subsequent encounter  S83.281D V58.89     836.1   3. Tear of medial meniscus of right knee, unspecified tear type, unspecified whether old or current tear, subsequent encounter  S83.241D V58.89     836.0   4. Chondromalacia of knee, right  M94.261 717.7       Subjective Questionnaire: LEFS: 76    Subjective Evaluation    History of Present Illness    Subjective comment: Pt is 63 y/o M who presents to therapy c/o R knee pain. He states he previously tore lateral meniscus and underwent partial menisectomy in 2023. He reports he began having pain and popping in fall  and subsequent MRI revealed partial tear of medial meniscus, cleavage tear of lateral meniscus, and tricompartmental chondromalacia. He reports pain and swelling with difficulty walking, standing, and navigating stairs as well as occasionally with dressing lower body. Denies N/T.Pain  Current pain ratin  At best pain ratin  At worst pain ratin  Location: R anterior knee  Quality: dull ache  Exacerbated by: walking on uneven surfaces.    Treatments  Previous treatment: medication  Patient Goals  Patient goals for therapy: decreased pain, increased strength and increased motion           Objective          Palpation   Left   No palpable tenderness to the lateral gastrocnemius and medial gastrocnemius.     Right   No palpable tenderness to the lateral gastrocnemius and medial gastrocnemius.     Tenderness     Right Knee   No  tenderness in the inferior patella, lateral joint line, lateral patella, medial joint line, medial patella and popliteal fossa.     Neurological Testing     Sensation     Lumbar   Left   Intact: light touch    Right   Intact: light touch    Active Range of Motion   Left Knee   Flexion: 134 degrees   Extension: 0 degrees     Right Knee   Flexion: 129 degrees with pain  Extensor la degrees     Patellar Mobility   Left Knee Patellar tendons within functional limits include the medial. Hypomobile in the left lateral, left superior and left inferior patellar tendon(s).     Right Knee Patellar tendons within functional limits include the medial, lateral, superior and inferior.     Strength/Myotome Testing     Left Hip   Planes of Motion   Flexion: 4+  Extension: 4-  Abduction: 4    Right Hip   Planes of Motion   Flexion: 4+  Extension: 4-  Abduction: 4-    Left Knee   Flexion: 5  Extension: 5    Right Knee   Flexion: 4+  Extension: 4+    Left Ankle/Foot   Dorsiflexion: 5    Right Ankle/Foot   Dorsiflexion: 5    Left Hip Flexibility Comments:   Piriformis mod limited  Psoas mod limited    Right Hip Flexibility Comments:   Piriformis mod limited  Psoas mod limited    Left Knee Flexibility Comments:   HS and gastroc mildly limited    Right Knee Flexibility Comments:   HS mod limited  Gastroc mildly limited    Swelling     Left Knee Girth Measurement (cm)   Joint line: 39.2 cm    Right Knee Girth Measurement (cm)   Joint line: 40.5 cm    Ambulation     Observational Gait   Walking speed and stride length within functional limits.     Additional Observational Gait Details  Early heel rise B, increased knee flex on L, decreased knee ext on R        Patient Education: Educated pt on HEP and POC.    Assessment & Plan       Assessment  Impairments: abnormal gait, abnormal or restricted ROM, activity intolerance, impaired physical strength, lacks appropriate home exercise program, pain with function and weight-bearing  intolerance   Functional limitations: lifting, walking, uncomfortable because of pain and standing   Assessment details: Pt is 64 y.o. male who presents to therapy c/o R knee pain. He previously tore R lateral meniscus and underwent partial menisectomy in July 2023. He reports he new onset of pain and popping in fall 2024. MRI of R knee on 11/12/24 revealed partial tear of medial meniscus, horizontal cleavage tear of lateral meniscus, and tricompartmental chondromalacia. He reports pain and swelling with difficulty walking, standing, and navigating stairs as well as occasionally with dressing lower body.  PMH significant for arthritis, prostate CA, and DVT. LEFS score 76. Deficits observed in gait, BLE flexibility, BLE strength, and R knee AROM. Pt requires skilled therapy addressing deficits in order to return to PLOF. Educated pt on HEP and POC, pt verbalized understanding. Per pt, he has a tripped to [place] planned beginning on 5/13/25 and would like to be ready to walk long distances by that time.  Prognosis: good    Goals  Plan Goals: STG 4 Weeks:    Pt will demonstrate understanding of initial HEP without need for cueing.    Pt will rate worst pain 2/10 or less for ease with standing and walking.    Pt will demonstrate increased flexibility of B HS, piriformis, and psoas to mildly limited or better for ease with dressing.    Pt will demonstrate increased R knee ext AROM to full ext for improvement in gait mechanics.      LTG by Discharge:    Pt will be independent with HEP for return to PLOF with decreased symptoms.    Pt will demonstrate increased strength of B hip ext and abd to 4+/5 or greater for improvement in stair navigation and greater stability with ADLs.    Pt will ambulate community distances with gait mechanics WNL without pain for return to PLOF.    LEFS score will improve to 80 or better for improvement in function with ADLs.    Plan  Therapy options: will be seen for skilled therapy  services  Planned modality interventions: dry needling, TENS, thermotherapy (hydrocollator packs), ultrasound, cryotherapy and electrical stimulation/Russian stimulation  Planned therapy interventions: manual therapy, balance/weight-bearing training, flexibility, functional ROM exercises, gait training, home exercise program, joint mobilization, neuromuscular re-education, soft tissue mobilization, spinal/joint mobilization, strengthening, stretching and therapeutic activities  Frequency: 2x week  Duration in weeks: 12  Treatment plan discussed with: patient        History # of Personal Factors and/or Comorbidities: MODERATE (1-2)  Examination of Body System(s): # of elements: LOW (1-2)  Clinical Presentation: STABLE   Clinical Decision Making: LOW       Timed:         Manual Therapy:         mins  20885;     Therapeutic Exercise:    15     mins  07733;     Neuromuscular Jay:        mins  39392;    Therapeutic Activity:          mins  68156;     Gait Training:           mins  10775;     Ultrasound:          mins  80110;    Ionto                                   mins   60924  Self Care                            mins   40080      Un-Timed:  Electrical Stimulation:         mins  59426 ( );  Traction          mins 87433  Low Eval     30     Mins  87390  Mod Eval          Mins  35854  High Eval                            Mins  65063        Timed Treatment:   15   mins   Total Treatment:     45   mins      PT SIGNATURE: Stephanie Amaya PT   Physical Therapist  [place] License #: [id number]  [place] License #: [id number]    DATE TREATMENT INITIATED: 3/10/2025    Initial Certification  Certification Period: 3/10/2025 thru 6/7/2025  I certify that the therapy services are furnished while this patient is under my care.  The services outlined above are required by this patient, and will be reviewed every 90 days.      Physician Signature: _________________________  PHYSICIAN: Derian Mack MD   NPI: [id number]                                              DATE: _____________________________________    Please sign and return via fax to [phone]. Thank you, [facility].

## 2025-03-10 NOTE — TELEPHONE ENCOUNTER
Lvm with pt and advised that Dr. Bae doesn't have anything available this week but I could get him in with an NP. Asked pt to call me back to get this scheduled.

## 2025-03-17 ENCOUNTER — TREATMENT (OUTPATIENT)
Dept: PHYSICAL THERAPY | Facility: CLINIC | Age: 65
End: 2025-03-17
Payer: COMMERCIAL

## 2025-03-17 DIAGNOSIS — M94.261 CHONDROMALACIA OF KNEE, RIGHT: ICD-10-CM

## 2025-03-17 DIAGNOSIS — S83.281D TEAR OF LATERAL MENISCUS OF RIGHT KNEE, UNSPECIFIED TEAR TYPE, UNSPECIFIED WHETHER OLD OR CURRENT TEAR, SUBSEQUENT ENCOUNTER: ICD-10-CM

## 2025-03-17 DIAGNOSIS — M25.561 RIGHT KNEE PAIN, UNSPECIFIED CHRONICITY: Primary | ICD-10-CM

## 2025-03-17 DIAGNOSIS — S83.241D TEAR OF MEDIAL MENISCUS OF RIGHT KNEE, UNSPECIFIED TEAR TYPE, UNSPECIFIED WHETHER OLD OR CURRENT TEAR, SUBSEQUENT ENCOUNTER: ICD-10-CM

## 2025-03-17 NOTE — PROGRESS NOTES
Physical Therapy Daily Treatment Note  [facility]  [address]  [place] [address]    Patient: Ck Arreguin   : 1960  Referring practitioner: Derian Mack MD  Today's Date: 3/17/2025  Patient seen for 2 sessions    Visit Diagnoses:    ICD-10-CM ICD-9-CM   1. Right knee pain, unspecified chronicity  M25.561 719.46   2. Tear of lateral meniscus of right knee, unspecified tear type, unspecified whether old or current tear, subsequent encounter  S83.281D V58.89     836.1   3. Tear of medial meniscus of right knee, unspecified tear type, unspecified whether old or current tear, subsequent encounter  S83.241D V58.89     836.0   4. Chondromalacia of knee, right  M94.261 717.7       Subjective   Ck Arreguin reports: that he has done well with his HEP.  States that he is able to walk on his treadmill at 2.7 mph for a mile at home without pain or swelling.        Objective   Presents with normal gait pattern    See Exercise, Manual, and Modality Logs for complete treatment.     Patient Education: Required some positoin changes for better stretches for HEP    Assessment/Plan  Patient with good compliance to his HEP.  Requirted some alterations of program for better technique.     Progress strengthening /stabilization /functional activity           Timed:  Manual Therapy:    0     mins  48592;  Therapeutic Exercise:    15/25     mins  52130;     Neuromuscular Jay:    10    mins  91763;    Therapeutic Activity:     10     mins  05916;     Ultrasound:      0     mins  64954;    Iontophoresis              __0_   mins  Dry Needling               _____   mins        Untimed:  Electrical Stimulation:     0    mins  21579 ( );  Mechanical Traction:             mins  75738;   Paraffin                       _____  mins     Timed Treatment:   35   mins   Total Treatment:     50   mins    Kayleen Johnson, PT  [place] License # [id number]  Physical  Therapist    Electronically signed by Kayleen Johnson, PT, 03/17/25, 9:28 AM EDT

## 2025-03-19 ENCOUNTER — TREATMENT (OUTPATIENT)
Dept: PHYSICAL THERAPY | Facility: CLINIC | Age: 65
End: 2025-03-19
Payer: COMMERCIAL

## 2025-03-19 DIAGNOSIS — S83.281D TEAR OF LATERAL MENISCUS OF RIGHT KNEE, UNSPECIFIED TEAR TYPE, UNSPECIFIED WHETHER OLD OR CURRENT TEAR, SUBSEQUENT ENCOUNTER: ICD-10-CM

## 2025-03-19 DIAGNOSIS — M25.561 RIGHT KNEE PAIN, UNSPECIFIED CHRONICITY: Primary | ICD-10-CM

## 2025-03-19 DIAGNOSIS — S83.241D TEAR OF MEDIAL MENISCUS OF RIGHT KNEE, UNSPECIFIED TEAR TYPE, UNSPECIFIED WHETHER OLD OR CURRENT TEAR, SUBSEQUENT ENCOUNTER: ICD-10-CM

## 2025-03-19 DIAGNOSIS — M94.261 CHONDROMALACIA OF KNEE, RIGHT: ICD-10-CM

## 2025-03-19 NOTE — PROGRESS NOTES
Physical Therapy Daily Treatment Note  [facility]  [address], [address]  [place] [address]    Patient: Ck Arreguin   : 1960  Referring practitioner: Derian Mack MD  Today's Date: 3/19/2025  Patient seen for 3 sessions    Visit Diagnoses:    ICD-10-CM ICD-9-CM   1. Right knee pain, unspecified chronicity  M25.561 719.46   2. Tear of lateral meniscus of right knee, unspecified tear type, unspecified whether old or current tear, subsequent encounter  S83.281D V58.89     836.1   3. Tear of medial meniscus of right knee, unspecified tear type, unspecified whether old or current tear, subsequent encounter  S83.241D V58.89     836.0   4. Chondromalacia of knee, right  M94.261 717.7       Subjective   Ck Arreguin reports: that he had some muscle soreness after the last session but no joint soreness.  Still feels ache deep to the patella but no sharp pain.      Objective   Tight hamstrings so did some manual stretch to them today    See Exercise, Manual, and Modality Logs for complete treatment.     Patient Education: purpose of tape, wearing patterns    Assessment/Plan  Ck had some muscle soreness with the exercises but no joint pain.  Had some relief of discomfort with tape in place today.  Very compliant with his therapy.       Progress strengthening /stabilization /functional activity           Timed:  Manual Therapy:    10     mins  58201;  Therapeutic Exercise:    15/25     mins  70633;     Neuromuscular Jay:    0    mins  58883;    Therapeutic Activity:     10     mins  55691;     Ultrasound:      0     mins  35300;    Iontophoresis              __0_   mins  Dry Needling               _____   mins        Untimed:  Electrical Stimulation:     0    mins  77920 ( );  Mechanical Traction:             mins  46389;   Paraffin                       _____  mins     Timed Treatment:   35   mins   Total Treatment:     55   mins    Kayleen Johnson  PT  [place] License # [id number]  Physical Therapist    Electronically signed by Kayleen Johnson, PT, 03/19/25, 9:09 AM EDT

## 2025-03-24 ENCOUNTER — TREATMENT (OUTPATIENT)
Dept: PHYSICAL THERAPY | Facility: CLINIC | Age: 65
End: 2025-03-24
Payer: COMMERCIAL

## 2025-03-24 DIAGNOSIS — S83.281D TEAR OF LATERAL MENISCUS OF RIGHT KNEE, UNSPECIFIED TEAR TYPE, UNSPECIFIED WHETHER OLD OR CURRENT TEAR, SUBSEQUENT ENCOUNTER: ICD-10-CM

## 2025-03-24 DIAGNOSIS — M94.261 CHONDROMALACIA OF KNEE, RIGHT: ICD-10-CM

## 2025-03-24 DIAGNOSIS — M25.561 RIGHT KNEE PAIN, UNSPECIFIED CHRONICITY: Primary | ICD-10-CM

## 2025-03-24 DIAGNOSIS — S83.241D TEAR OF MEDIAL MENISCUS OF RIGHT KNEE, UNSPECIFIED TEAR TYPE, UNSPECIFIED WHETHER OLD OR CURRENT TEAR, SUBSEQUENT ENCOUNTER: ICD-10-CM

## 2025-03-24 PROCEDURE — 97112 NEUROMUSCULAR REEDUCATION: CPT | Performed by: PHYSICAL THERAPIST

## 2025-03-24 PROCEDURE — 97110 THERAPEUTIC EXERCISES: CPT | Performed by: PHYSICAL THERAPIST

## 2025-03-24 PROCEDURE — 97140 MANUAL THERAPY 1/> REGIONS: CPT | Performed by: PHYSICAL THERAPIST

## 2025-03-25 ENCOUNTER — TELEPHONE (OUTPATIENT)
Dept: ONCOLOGY | Facility: CLINIC | Age: 65
End: 2025-03-25
Payer: COMMERCIAL

## 2025-03-25 NOTE — TELEPHONE ENCOUNTER
Caller: Ck Arreguin    Relationship to patient: Self    Best call back number: [phone]    Type of visit: LAB AND FU    Requested date: 4/3, 4/4 OR 4/14     If rescheduling, when is the original appointment: 4/7    Additional notes:PT OUT OF TOWN

## 2025-04-14 ENCOUNTER — LAB (OUTPATIENT)
Dept: OTHER | Facility: HOSPITAL | Age: 65
End: 2025-04-14
Payer: COMMERCIAL

## 2025-04-14 ENCOUNTER — OFFICE VISIT (OUTPATIENT)
Dept: ONCOLOGY | Facility: CLINIC | Age: 65
End: 2025-04-14
Payer: COMMERCIAL

## 2025-04-14 VITALS
WEIGHT: 245.5 LBS | RESPIRATION RATE: 17 BRPM | SYSTOLIC BLOOD PRESSURE: 137 MMHG | DIASTOLIC BLOOD PRESSURE: 80 MMHG | HEART RATE: 82 BPM | BODY MASS INDEX: 33.25 KG/M2 | HEIGHT: 72 IN | OXYGEN SATURATION: 97 % | TEMPERATURE: 97.2 F

## 2025-04-14 DIAGNOSIS — I82.409 RECURRENT DEEP VEIN THROMBOSIS (DVT): Primary | ICD-10-CM

## 2025-04-14 DIAGNOSIS — I82.409 RECURRENT DEEP VEIN THROMBOSIS (DVT): ICD-10-CM

## 2025-04-14 LAB
ALBUMIN SERPL-MCNC: 4.3 G/DL (ref 3.5–5.2)
ALBUMIN/GLOB SERPL: 1.7 G/DL
ALP SERPL-CCNC: 63 U/L (ref 39–117)
ALT SERPL W P-5'-P-CCNC: 13 U/L (ref 1–41)
ANION GAP SERPL CALCULATED.3IONS-SCNC: 9.9 MMOL/L (ref 5–15)
AST SERPL-CCNC: 24 U/L (ref 1–40)
BASOPHILS # BLD AUTO: 0.04 10*3/MM3 (ref 0–0.2)
BASOPHILS NFR BLD AUTO: 0.4 % (ref 0–1.5)
BILIRUB SERPL-MCNC: 0.4 MG/DL (ref 0–1.2)
BUN SERPL-MCNC: 20 MG/DL (ref 8–23)
BUN/CREAT SERPL: 21.3 (ref 7–25)
CALCIUM SPEC-SCNC: 9.3 MG/DL (ref 8.6–10.5)
CHLORIDE SERPL-SCNC: 101 MMOL/L (ref 98–107)
CO2 SERPL-SCNC: 26.1 MMOL/L (ref 22–29)
CREAT SERPL-MCNC: 0.94 MG/DL (ref 0.76–1.27)
DEPRECATED RDW RBC AUTO: 39.2 FL (ref 37–54)
EGFRCR SERPLBLD CKD-EPI 2021: 91.1 ML/MIN/1.73
EOSINOPHIL # BLD AUTO: 0.14 10*3/MM3 (ref 0–0.4)
EOSINOPHIL NFR BLD AUTO: 1.4 % (ref 0.3–6.2)
ERYTHROCYTE [DISTWIDTH] IN BLOOD BY AUTOMATED COUNT: 12.2 % (ref 12.3–15.4)
GLOBULIN UR ELPH-MCNC: 2.6 GM/DL
GLUCOSE SERPL-MCNC: 114 MG/DL (ref 65–99)
HCT VFR BLD AUTO: 41.4 % (ref 37.5–51)
HGB BLD-MCNC: 13.7 G/DL (ref 13–17.7)
IMM GRANULOCYTES # BLD AUTO: 0.11 10*3/MM3 (ref 0–0.05)
IMM GRANULOCYTES NFR BLD AUTO: 1.1 % (ref 0–0.5)
LYMPHOCYTES # BLD AUTO: 1.82 10*3/MM3 (ref 0.7–3.1)
LYMPHOCYTES NFR BLD AUTO: 18.7 % (ref 19.6–45.3)
MCH RBC QN AUTO: 29.2 PG (ref 26.6–33)
MCHC RBC AUTO-ENTMCNC: 33.1 G/DL (ref 31.5–35.7)
MCV RBC AUTO: 88.3 FL (ref 79–97)
MONOCYTES # BLD AUTO: 0.78 10*3/MM3 (ref 0.1–0.9)
MONOCYTES NFR BLD AUTO: 8 % (ref 5–12)
NEUTROPHILS NFR BLD AUTO: 6.82 10*3/MM3 (ref 1.7–7)
NEUTROPHILS NFR BLD AUTO: 70.4 % (ref 42.7–76)
NRBC BLD AUTO-RTO: 0 /100 WBC (ref 0–0.2)
PLATELET # BLD AUTO: 280 10*3/MM3 (ref 140–450)
PMV BLD AUTO: 10.4 FL (ref 6–12)
POTASSIUM SERPL-SCNC: 3.4 MMOL/L (ref 3.5–5.2)
PROT SERPL-MCNC: 6.9 G/DL (ref 6–8.5)
RBC # BLD AUTO: 4.69 10*6/MM3 (ref 4.14–5.8)
SODIUM SERPL-SCNC: 137 MMOL/L (ref 136–145)
WBC NRBC COR # BLD AUTO: 9.71 10*3/MM3 (ref 3.4–10.8)

## 2025-04-14 PROCEDURE — 36415 COLL VENOUS BLD VENIPUNCTURE: CPT

## 2025-04-14 PROCEDURE — 99214 OFFICE O/P EST MOD 30 MIN: CPT | Performed by: INTERNAL MEDICINE

## 2025-04-14 PROCEDURE — 85025 COMPLETE CBC W/AUTO DIFF WBC: CPT | Performed by: INTERNAL MEDICINE

## 2025-04-14 PROCEDURE — 80053 COMPREHEN METABOLIC PANEL: CPT | Performed by: INTERNAL MEDICINE

## 2025-04-14 RX ORDER — MELOXICAM 15 MG/1
1 TABLET ORAL DAILY
COMMUNITY
Start: 2025-03-17

## 2025-04-14 NOTE — LETTER
"April 14, 2025     Garcia Tyson Sr., MD  [address]  [address]  [facility] [address]    Patient: Ck Arreguin   YOB: 1960   Date of Visit: 4/14/2025     Dear Garcia Tyson Sr., MD:       Thank you for referring Ck Arreguin to me for evaluation. Below are the relevant portions of my assessment and plan of care.    If you have questions, please do not hesitate to call me. I look forward to following Ck along with you.         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  He was last seen here on 2/12/2024, appointments rescheduled due to his travel schedule with work primarily.  He notes that he has done reasonably well on Xarelto, has not experienced any bleeding issues.  He unfortunately reinjured his right knee around 6 to 8 months ago and has been following up with orthopedics.  There is some possibility he may require an additional surgical procedure.  He notes that the pain has improved somewhat and he is becoming more active, now walking on the treadmill.  He was prescribed meloxicam and has been taking this 15 mg daily for the past 2 to 3 months.  He does continue annual follow-up with urology, last seen on 2/20/2025 with undetectable PSA.  He is not scheduled for routine follow-up with his PCP.  He does not wear his compression stockings routinely.  He does have some chronic edema, right greater than left lower extremity      Objective  Vital Signs:   /80   Pulse 82   Temp 97.2 °F (36.2 °C) (Oral)   Resp 17   Ht 182.9 cm (72\")   Wt 111 kg (245 lb 8 oz)   SpO2 " 97%   BMI 33.30 kg/m²     Physical Exam  Constitutional:       Appearance: He is well-developed.   Eyes:      Conjunctiva/sclera: Conjunctivae normal.   Neck:      Thyroid: No thyromegaly.   Cardiovascular:      Rate and Rhythm: Normal rate and regular rhythm.      Heart sounds: No murmur heard.     No friction rub. No gallop.   Pulmonary:      Effort: No respiratory distress.      Breath sounds: Normal breath sounds.   Abdominal:      General: Bowel sounds are normal. There is no distension.      Palpations: Abdomen is soft.      Tenderness: There is no abdominal tenderness.   Musculoskeletal:      Comments: Trace bilateral lower extremity edema, right slightly greater than left with minor varicosities and venous stasis changes noted on the right   Lymphadenopathy:      Head:      Right side of head: No submandibular adenopathy.      Cervical: No cervical adenopathy.      Upper Body:      Right upper body: No supraclavicular adenopathy.      Left upper body: No supraclavicular adenopathy.   Skin:     General: Skin is warm and dry.      Findings: No rash.   Neurological:      Mental Status: He is alert and oriented to person, place, and time.      Cranial Nerves: No cranial nerve deficit.      Motor: No abnormal muscle tone.      Deep Tendon Reflexes: Reflexes normal.   Psychiatric:         Behavior: Behavior normal.            Result Review: Reviewed CBC, CMP from today.  Reviewed urology records.       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  Patient reports anticoagulation x3 to 4 months.  Acute left calf DVT 9/26/2018 while off anticoagulation, treated with Xarelto.  Follow-up Doppler 11/12/2018 with chronic right calf DVT, acute left calf DVT  Hypercoagulable evaluation 11/5/2018 with factor V Leiden heterozygosity, negative prothrombin gene mutation, protein C activity 112%, protein S free  104%, protein S activity 78%, negative anticardiolipin antibody panel, negative beta-2 GP 1 antibody panel, negative lupus anticoagulant.  Due to unprovoked recurrent DVT in the setting of factor V Leiden heterozygosity, recommended long-term anticoagulation with Xarelto.  Recommended long-term anticoagulation however patient felt to be a candidate to decrease from 20 mg down to 10 mg Xarelto daily.  Dose decreased on 7/25/2022.  Patient returns today in follow-up continuing on chronic anticoagulation with reduced dose Xarelto at 10 mg daily for maintenance therapy.  Patient continues to tolerate anticoagulation well with no bleeding complications.  He is aware to notify us if any procedures are needed in the future, particularly of an orthopedic nature, noting that he may require surgery on his right knee at some point.  He is continuing on meloxicam daily and I did caution him on continued use of meloxicam in the setting of Xarelto.  He will try to back off on the medication and try to use Tylenol instead.  He does continue with mild postphlebitic symptoms in his lower extremities, right greater than left.  He is not compliant with his compression stockings and we discussed benefit of ongoing more routine use of lower extremity compression stockings particularly when he is traveling and he expressed understanding.  He will return in 6 months for further follow-up.    *Prostate cancer  Status post prostatectomy in 2008  Patient continues routine follow-up on an annual basis with urology, PSA has been undetectable, last seen on 2/20/2025    *Health maintenance/PCP  Patient did establish care with a new PCP Dr. Tyson in May 2023.  Patient is not continuing with routine follow-up visits with his PCP.  He did undergo Cologuard testing in May 2023 which was negative but has never undergone colonoscopy.  We did discuss consideration of future colonoscopy.        Plan:  Continue Xarelto 10 mg daily intending long-term  treatment.    Continue using bilateral lower extremity Jobst stockings knee-high 20-30 mmHg.  Patient encouraged to wear compression stockings when he is up on his feet for a longer period of time or traveling.  Patient is aware to notify us if any surgical procedures are needed for management of Xarelto perioperatively.  MD visit in 6 months with CBC, CMP

## 2025-04-14 NOTE — PROGRESS NOTES
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  He was last seen here on 2/12/2024, appointments rescheduled due to his travel schedule with work primarily.  He notes that he has done reasonably well on Xarelto, has not experienced any bleeding issues.  He unfortunately reinjured his right knee around 6 to 8 months ago and has been following up with orthopedics.  There is some possibility he may require an additional surgical procedure.  He notes that the pain has improved somewhat and he is becoming more active, now walking on the treadmill.  He was prescribed meloxicam and has been taking this 15 mg daily for the past 2 to 3 months.  He does continue annual follow-up with urology, last seen on 2/20/2025 with undetectable PSA.  He is not scheduled for routine follow-up with his PCP.  He does not wear his compression stockings routinely.  He does have some chronic edema, right greater than left lower extremity      Objective   Vital Signs:   /80   Pulse 82   Temp 97.2 °F (36.2 °C) (Oral)   Resp 17   Ht 182.9 cm (72\")   Wt 111 kg (245 lb 8 oz)   SpO2 97%   BMI 33.30 kg/m²     Physical Exam  Constitutional:       Appearance: He is well-developed.   Eyes:      Conjunctiva/sclera: Conjunctivae normal.   Neck:      Thyroid: No thyromegaly.   Cardiovascular:      Rate and Rhythm: Normal rate and regular rhythm.      Heart sounds: No murmur heard.     No friction rub. No gallop.   Pulmonary:      Effort: No respiratory distress.      Breath sounds: Normal breath sounds.   Abdominal:      General: Bowel sounds are normal. There is no distension.      Palpations: Abdomen is soft.      Tenderness: There is no abdominal " tenderness.   Musculoskeletal:      Comments: Trace bilateral lower extremity edema, right slightly greater than left with minor varicosities and venous stasis changes noted on the right   Lymphadenopathy:      Head:      Right side of head: No submandibular adenopathy.      Cervical: No cervical adenopathy.      Upper Body:      Right upper body: No supraclavicular adenopathy.      Left upper body: No supraclavicular adenopathy.   Skin:     General: Skin is warm and dry.      Findings: No rash.   Neurological:      Mental Status: He is alert and oriented to person, place, and time.      Cranial Nerves: No cranial nerve deficit.      Motor: No abnormal muscle tone.      Deep Tendon Reflexes: Reflexes normal.   Psychiatric:         Behavior: Behavior normal.            Result Review : Reviewed CBC, CMP from today.  Reviewed urology records.       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  Patient reports anticoagulation x3 to 4 months.  Acute left calf DVT 9/26/2018 while off anticoagulation, treated with Xarelto.  Follow-up Doppler 11/12/2018 with chronic right calf DVT, acute left calf DVT  Hypercoagulable evaluation 11/5/2018 with factor V Leiden heterozygosity, negative prothrombin gene mutation, protein C activity 112%, protein S free 104%, protein S activity 78%, negative anticardiolipin antibody panel, negative beta-2 GP 1 antibody panel, negative lupus anticoagulant.  Due to unprovoked recurrent DVT in the setting of factor V Leiden heterozygosity, recommended long-term anticoagulation with Xarelto.  Recommended long-term anticoagulation however patient felt to be a candidate to decrease from 20 mg down to 10 mg Xarelto daily.  Dose decreased on 7/25/2022.  Patient returns today in follow-up continuing on chronic anticoagulation with reduced dose Xarelto at 10 mg daily for maintenance therapy.   Patient continues to tolerate anticoagulation well with no bleeding complications.  He is aware to notify us if any procedures are needed in the future, particularly of an orthopedic nature, noting that he may require surgery on his right knee at some point.  He is continuing on meloxicam daily and I did caution him on continued use of meloxicam in the setting of Xarelto.  He will try to back off on the medication and try to use Tylenol instead.  He does continue with mild postphlebitic symptoms in his lower extremities, right greater than left.  He is not compliant with his compression stockings and we discussed benefit of ongoing more routine use of lower extremity compression stockings particularly when he is traveling and he expressed understanding.  He will return in 6 months for further follow-up.    *Prostate cancer  Status post prostatectomy in 2008  Patient continues routine follow-up on an annual basis with urology, PSA has been undetectable, last seen on 2/20/2025    *Health maintenance/PCP  Patient did establish care with a new PCP Dr. Tyson in May 2023.  Patient is not continuing with routine follow-up visits with his PCP.  He did undergo Cologuard testing in May 2023 which was negative but has never undergone colonoscopy.  We did discuss consideration of future colonoscopy.        Plan:  Continue Xarelto 10 mg daily intending long-term treatment.    Continue using bilateral lower extremity Jobst stockings knee-high 20-30 mmHg.  Patient encouraged to wear compression stockings when he is up on his feet for a longer period of time or traveling.  Patient is aware to notify us if any surgical procedures are needed for management of Xarelto perioperatively.  MD visit in 6 months with CBC, CMP

## 2025-05-04 DIAGNOSIS — K21.9 GASTROESOPHAGEAL REFLUX DISEASE WITHOUT ESOPHAGITIS: ICD-10-CM

## 2025-05-05 RX ORDER — OMEPRAZOLE 40 MG/1
40 CAPSULE, DELAYED RELEASE ORAL DAILY
Qty: 90 CAPSULE | Refills: 1 | Status: SHIPPED | OUTPATIENT
Start: 2025-05-05

## 2025-06-20 ENCOUNTER — OFFICE VISIT (OUTPATIENT)
Dept: ORTHOPEDIC SURGERY | Facility: CLINIC | Age: 65
End: 2025-06-20
Payer: COMMERCIAL

## 2025-06-20 ENCOUNTER — PREP FOR SURGERY (OUTPATIENT)
Dept: OTHER | Facility: HOSPITAL | Age: 65
End: 2025-06-20
Payer: COMMERCIAL

## 2025-06-20 VITALS — BODY MASS INDEX: 32.44 KG/M2 | HEIGHT: 72 IN | TEMPERATURE: 97.7 F | WEIGHT: 239.5 LBS

## 2025-06-20 DIAGNOSIS — R52 PAIN: Primary | ICD-10-CM

## 2025-06-20 DIAGNOSIS — M16.11 PRIMARY OSTEOARTHRITIS OF RIGHT HIP: Primary | ICD-10-CM

## 2025-06-20 PROCEDURE — 99204 OFFICE O/P NEW MOD 45 MIN: CPT | Performed by: NURSE PRACTITIONER

## 2025-06-20 RX ORDER — CHLORHEXIDINE GLUCONATE 500 MG/1
CLOTH TOPICAL 2 TIMES DAILY
OUTPATIENT
Start: 2025-06-20

## 2025-06-20 RX ORDER — PREGABALIN 75 MG/1
150 CAPSULE ORAL ONCE
OUTPATIENT
Start: 2025-06-20 | End: 2025-06-20

## 2025-06-20 RX ORDER — MELOXICAM 15 MG/1
15 TABLET ORAL ONCE
OUTPATIENT
Start: 2025-06-20 | End: 2025-06-20

## 2025-06-20 NOTE — PROGRESS NOTES
Patient: Ck Arreguin  YOB: 1960 64 y.o. male  Medical Record Number: [id number]    Chief Complaints:   Chief Complaint   Patient presents with    Right Knee - Pain, Initial Evaluation       History of Present Illness:Ck Arreguin is a 64 y.o. male who presents as a new patient both myself as well as to the practice with complaints of right knee pain.  The patient reports he had previous right meniscus tear in March 2023, ended up having knee arthroscopy by Dr. Mack in July 2023 it did improve but then unfortunately had another tear confirmed on MRI November 2024 Dr. Mack did not recommend subsequent knee arthroscopy instead gave him a prescription for Mobic which did seem to help although he is on Xarelto so he was told he should not really been taking that.  Patient reports increased knee pain.  He also complains of bilateral hip pain that is worsened in nature as well as stiffness and groin pain.    Allergies:   Allergies   Allergen Reactions    Bactrim [Sulfamethoxazole-Trimethoprim] Rash       Medications:   Current Outpatient Medications   Medication Sig Dispense Refill    Cialis 5 MG tablet Take 1 tablet by mouth Daily As Needed.      meloxicam (MOBIC) 15 MG tablet Take 1 tablet by mouth Daily.      omeprazole (priLOSEC) 40 MG capsule TAKE 1 CAPSULE BY MOUTH EVERY DAY 90 capsule 1    Xarelto 10 MG tablet TAKE 1 TABLET BY MOUTH EVERY DAY 90 tablet 1     No current facility-administered medications for this visit.         The following portions of the patient's history were reviewed and updated as appropriate: allergies, current medications, past family history, past medical history, past social history, past surgical history and problem list.    Review of Systems:   14 point review of systems was performed. All systems negative except pertinent positives/negatives listed in HPI above    Physical Exam:   Vitals:    06/20/25 1329   Temp: 97.7 °F (36.5 °C)   Weight: 109 kg (239 lb 8 oz)  "  Height: 182.9 cm (72\")   PainSc: 1    PainLoc: Knee       General: A and O x 3, ASA, NAD    Skin clear no unusual lesions noted  Right knee patient has no appreciable effusion 116 degrees flexion neutral in extension mildly positive Edilberto negative Lockman calf soft and nontender, he does however have severe pain of bilateral hips with internal extra rotation with a positive Stinchfield positive logroll      Radiology:  Xrays 3views (ap,lateral, sunrise) right knee and subsequent x-rays 2 views of right hip were ordered and reviewed today secondary to pain patient does have moderate arthritic changes noted of the right knee he does however have bone-on-bone end-stage osteoarthritis bilateral hips right greater than left.  No comparative views available    Assessment/Plan: End-stage osteoarthritis bilateral hips right greater than left  Moderate osteoarthritis right knee  Atypical right knee pain secondary to bone-on-bone end-stage osteoarthritis right hip    Patient and I discussed options, unfortunately it appears as though his primarily presenting symptom is secondary to his bone-on-bone end-stage osteoarthritis of the right hip, we discussed options including conservative measures versus total hip replacement, patient would like to proceed with right total hip replacement anterior approach same day home health.  Risk benefits alternatives all discussed, patient would like to proceed, patient will contact Dr. Bae who has him on the Xarelto for DVT and factor V Leiden mutation to obtain clearance      Lynette Brown, APRN  6/20/2025  "

## 2025-07-01 ENCOUNTER — TELEPHONE (OUTPATIENT)
Dept: ORTHOPEDIC SURGERY | Facility: CLINIC | Age: 65
End: 2025-07-01

## 2025-07-01 NOTE — TELEPHONE ENCOUNTER
Please let patient know that I am so sorry that he is hurting so much, but unfortunately were not able to prescribe any pain medication prior to surgery, it is certainly something he could talk to his PCP about, however he can try extra strength Tylenol in the meantime

## 2025-07-01 NOTE — TELEPHONE ENCOUNTER
Caller: Xi Arreguin    Relationship: Emergency Contact    Best call back number:     What is the best time to reach you: ANYTIME    Who are you requesting to speak with (clinical staff, provider,  specific staff member): NIKKI IRENE    What was the call regarding: PATIENTS WIFE STATES HER  IS SCHEDULED FOR HIP REPLACEMENT SURGERY AND IT IS BONE ON BONE. PATIENTS WIFE STATES HE IS IN SUCH SEVERE PAIN HE IS SLEEPING MAXIMUM OF 2 HOURS A NIGHT. THE PATIENTS WIFE SAYS WITH HIM BEING ON XARELTO HE ISN'T SUPPOSED TO TAKE ANTIINFLAMMATORIES. PATIENTS WIFE IS WANTING A CALL BACK TO DISCUSS WHAT CAN BE DONE UNTIL HIS PROCEDURE TAKES PLACE TO HELP ASSIST. PLEASE CONTACT THE PATIENTS WIFE.     Is it okay if the provider responds through MyChart: CALL

## 2025-07-02 ENCOUNTER — TELEPHONE (OUTPATIENT)
Dept: ONCOLOGY | Facility: CLINIC | Age: 65
End: 2025-07-02
Payer: COMMERCIAL

## 2025-07-02 NOTE — TELEPHONE ENCOUNTER
Caller: Ck Arreguin    Relationship: Self    Best call back number: [phone]     What is the best time to reach you: ANYTIME    Who are you requesting to speak with (clinical staff, provider,  specific staff member): CLINICAL    What was the call regarding: PATIENT IS ON XARELTO. HE HAS SURGERY SCHEDULED 7/18 AND NEEDS TO KNOW WHEN TO STOP THE MEDICATION AND START IT BACK.     ALSO WANTS TO KNOW IF THERE IS SOMETHING BESIDES MELOXICAM DR SALAZAR WOULD BE OK WITH PATIENT TAKING AS HE HAD RECOMMENDED HIM NOT TO USE MELOXICAM BUT HE DOES HAVE PAIN ESPECIALLY WORSE AT NIGHT.     PLEASE CALL TO DISCUSS FURTHER

## 2025-07-03 ENCOUNTER — TELEPHONE (OUTPATIENT)
Dept: FAMILY MEDICINE CLINIC | Facility: CLINIC | Age: 65
End: 2025-07-03

## 2025-07-03 ENCOUNTER — PRE-ADMISSION TESTING (OUTPATIENT)
Dept: PREADMISSION TESTING | Facility: HOSPITAL | Age: 65
End: 2025-07-03
Payer: COMMERCIAL

## 2025-07-03 ENCOUNTER — TELEPHONE (OUTPATIENT)
Dept: ONCOLOGY | Facility: CLINIC | Age: 65
End: 2025-07-03
Payer: COMMERCIAL

## 2025-07-03 VITALS
BODY MASS INDEX: 31.92 KG/M2 | DIASTOLIC BLOOD PRESSURE: 82 MMHG | SYSTOLIC BLOOD PRESSURE: 151 MMHG | HEART RATE: 85 BPM | HEIGHT: 72 IN | TEMPERATURE: 98 F | OXYGEN SATURATION: 96 % | RESPIRATION RATE: 16 BRPM | WEIGHT: 235.7 LBS

## 2025-07-03 LAB
ANION GAP SERPL CALCULATED.3IONS-SCNC: 11.9 MMOL/L (ref 5–15)
BUN SERPL-MCNC: 18 MG/DL (ref 8–23)
BUN/CREAT SERPL: 16.5 (ref 7–25)
CALCIUM SPEC-SCNC: 9.1 MG/DL (ref 8.6–10.5)
CHLORIDE SERPL-SCNC: 104 MMOL/L (ref 98–107)
CO2 SERPL-SCNC: 24.1 MMOL/L (ref 22–29)
CREAT SERPL-MCNC: 1.09 MG/DL (ref 0.76–1.27)
DEPRECATED RDW RBC AUTO: 39.1 FL (ref 37–54)
EGFRCR SERPLBLD CKD-EPI 2021: 76.3 ML/MIN/1.73
ERYTHROCYTE [DISTWIDTH] IN BLOOD BY AUTOMATED COUNT: 12 % (ref 12.3–15.4)
GLUCOSE SERPL-MCNC: 93 MG/DL (ref 65–99)
HCT VFR BLD AUTO: 43.5 % (ref 37.5–51)
HGB BLD-MCNC: 14.4 G/DL (ref 13–17.7)
INR PPP: 1.16 (ref 0.9–1.1)
MCH RBC QN AUTO: 29.7 PG (ref 26.6–33)
MCHC RBC AUTO-ENTMCNC: 33.1 G/DL (ref 31.5–35.7)
MCV RBC AUTO: 89.7 FL (ref 79–97)
PLATELET # BLD AUTO: 248 10*3/MM3 (ref 140–450)
PMV BLD AUTO: 10.3 FL (ref 6–12)
POTASSIUM SERPL-SCNC: 3.8 MMOL/L (ref 3.5–5.2)
PROTHROMBIN TIME: 14.8 SECONDS (ref 11.7–14.2)
QT INTERVAL: 418 MS
QTC INTERVAL: 431 MS
RBC # BLD AUTO: 4.85 10*6/MM3 (ref 4.14–5.8)
SODIUM SERPL-SCNC: 140 MMOL/L (ref 136–145)
WBC NRBC COR # BLD AUTO: 5.48 10*3/MM3 (ref 3.4–10.8)

## 2025-07-03 PROCEDURE — 85027 COMPLETE CBC AUTOMATED: CPT

## 2025-07-03 PROCEDURE — 93005 ELECTROCARDIOGRAM TRACING: CPT

## 2025-07-03 PROCEDURE — 85610 PROTHROMBIN TIME: CPT | Performed by: ORTHOPAEDIC SURGERY

## 2025-07-03 PROCEDURE — 80048 BASIC METABOLIC PNL TOTAL CA: CPT

## 2025-07-03 PROCEDURE — 36415 COLL VENOUS BLD VENIPUNCTURE: CPT

## 2025-07-03 RX ORDER — ACETAMINOPHEN 500 MG
500-1000 TABLET ORAL EVERY 6 HOURS PRN
COMMUNITY

## 2025-07-03 NOTE — TELEPHONE ENCOUNTER
Call back to  Kallie to clarify what surgery he will be having.  Patient states hip replacement is planned.

## 2025-07-03 NOTE — TELEPHONE ENCOUNTER
Call to  Kallie to let him know that Dr. Bae has responded to message today, and wants to instruct him to hold Xarelto for 48 hour prior to surgery, and to resume the day after surgery.   Patient verbalized understanding and appreciation for the call back.

## 2025-07-03 NOTE — TELEPHONE ENCOUNTER
Caller: Xi Arreguin    Relationship: Emergency Contact    Best call back number:   Telephone Information:   Mobile [phone]         What is the best time to reach you: ANY    Who are you requesting to speak with (clinical staff, provider,  specific staff member): CLINICAL    Do you know the name of the person who called: N/A    What was the call regarding: PATIENT IS GOING TO BE HAVING TOTAL HIP REPLACEMENT SURGERY. HE IS IN A LOT OF PAIN AT NIGHT WHEN TRYING TO SLEEP. ASKING IF THERE IS ANYTHING HE CAN TAKE OR IF THERES A POSITION HE COULD SLEEP IN THAT WOULD HELP. SURGEON ADVISED PATIENT TO CALL Garcia Tyson Sr., MD FOR FURTHER DIRECTION.     Is it okay if the provider responds through MyChart:

## 2025-07-03 NOTE — TELEPHONE ENCOUNTER
Patient returned call, and able to let him know that Dr. Bae is out of the office today, but we will review with him and call back about the amount of time to hold Xarelto prior to procedure.  Let him know that according to Dr. Bae's notes, he had recommended that he use Tylenol instead of the Meloxicam.  Let him know if he needs something stronger, he can check with his PCP.  Patient verbalized understanding and appreciation for the call back.

## 2025-07-03 NOTE — TELEPHONE ENCOUNTER
I called and spoke with pt,  only recommends tylenol for medication since he is close to having his surgery. If he wants something stronger he would have to come in to the clinic and be seen.    Pt verbalized understanding

## 2025-07-03 NOTE — DISCHARGE INSTRUCTIONS
Take the following medications the morning of surgery: HOLD CIALAS 48 HOURS PRIOR TO SURGERY. OMEPRAZOLE      If you are on an Aspirin or a Blood Thinner please clarify with the surgeon and prescribing physician if and when you are to hold the medication or if you are to continue the medication.  If you are on prescription narcotic pain medication to control your pain you may also take that medication the morning of surgery.      General Instructions:     Do not eat solid food after midnight the night before surgery.  Clear liquids day of surgery are allowed but must be stopped at least two hours before your hospital arrival time.       Allowed clear liquids      Water, sodas, and tea or coffee with no cream or milk added.       12 to 20 ounces of a clear liquid that contains carbohydrates is recommended.  If non-diabetic, have Gatorade or Powerade.  If diabetic, have G2 or Powerade Zero.     Do not have liquids red in color.  Do not consume chicken, beef, pork or vegetable broth or bouillon cubes of any variety as they are not considered clear liquids and are not allowed.        Patients who avoid smoking, chewing tobacco and alcohol for 4 weeks prior to surgery have a reduced risk of post-operative complications.  Quit smoking as many days before surgery as you can.  Do not smoke, use chewing tobacco or drink alcohol the day of surgery.   Bring any papers given to you in the doctor’s office.  Wear clean comfortable clothes.  Do not wear contact lenses, false eyelashes or make-up.  Bring a case for your glasses.   Bring crutches or walker if applicable.  Remove all piercings.  Leave jewelry and any other valuables at home.  The Pre-Admission Testing nurse will instruct you to bring medications if unable to obtain an accurate list in Pre-Admission Testing.    Day of surgery you will need to let the preoperative nurse know the last time you took each of your medications.  To ensure a safe environment for patients and  staff, we kindly ask that children under the age of 16 not accompany patients.  If you must bring a dependent child or dependent adult please ensure a responsible adult, other than yourself, is present to supervise them.          Preventing a Surgical Site Infection:  For 2 to 3 days before surgery, avoid shaving with a razor because the razor can irritate skin and make it easier to develop an infection.    Any areas of open skin can increase the risk of a post-operative wound infection by allowing bacteria to enter and travel throughout the body.  Notify your surgeon if you have any skin wounds / rashes even if it is not near the expected surgical site.  The area will need assessed to determine if surgery should be delayed until it is healed.  The night prior to surgery shower using a fresh bar of anti-bacterial soap (such as Dial) and clean washcloth.  Sleep in a clean bed with clean clothing.  Do not allow pets to sleep with you.  Shower on the morning of surgery using a fresh bar of anti-bacterial soap (such as Dial) and clean washcloth.  Dry with a clean towel and dress in clean clothing.  Ask your surgeon if you will be receiving antibiotics prior to surgery.  Make sure you, your family, and all healthcare providers clean their hands with soap and water or an alcohol based hand  before caring for you or your wound.    Day of surgery:  Your arrival time is approximately two hours before your scheduled surgery time.  Please note if you have an early arrival time the surgery doors do not open before 5:00 AM.  Upon arrival, a Pre-op nurse and Anesthesiologist will review your health history, obtain vital signs, and answer questions you may have.  The only belongings needed at this time will be a list of your home medications and if applicable your C-PAP/BI-PAP machine.  A Pre-op nurse will start an IV and you may receive medication in preparation for surgery, including something to help you relax.      Please be aware that surgery does come with discomfort.  We want to make every effort to control your discomfort so please discuss any uncontrolled symptoms with your nurse.   Your doctor will most likely have prescribed pain medications.      If you are going home after surgery you will receive individualized written care instructions before being discharged.  A responsible adult must drive you to and from the hospital on the day of your surgery and ideally stay with you through the night.   .  Discharge prescriptions can be filled by the hospital pharmacy during regular pharmacy hours.  If you are having surgery late in the day/evening your prescription may be e-prescribed to your pharmacy.  Please verify your pharmacy hours or chose a 24 hour pharmacy to avoid not having access to your prescription because your pharmacy has closed for the day.    If you are staying overnight following surgery, you will be transported to your hospital room following the recovery period.  [facility] has all private rooms.    If you have any questions please call Pre-Admission Testing at [phone].  Deductibles and co-payments are collected on the day of service. Please be prepared to pay the required co-pay, deductible or deposit on the day of service as defined by your plan.      CHLORHEXIDINE CLOTH INSTRUCTIONS  The morning of surgery follow these instructions using the Chlorhexidine cloths you've been given.  These steps reduce bacteria on the body.  Do not use the cloths near your eyes, ears mouth, genitalia or on open wounds.  Throw the cloths away after use but do not try to flush them down a toilet.      Open and remove one cloth at a time from the package.    Leave the cloth unfolded and begin the bathing.  Massage the skin with the cloths using gentle pressure to remove bacteria.  Do not scrub harshly.   Follow the steps below with one 2% CHG cloth per area (6 total cloths).  One cloth for neck,  shoulders and chest.  One cloth for both arms, hands, fingers and underarms (do underarms last).  One cloth for the abdomen followed by groin.  One cloth for right leg and foot including between the toes.  One cloth for left leg and foot including between the toes.  The last cloth is to be used for the back of the neck, back and buttocks.    Allow the CHG to air dry 3 minutes on the skin which will give it time to work and decrease the chance of irritation.  The skin may feel sticky until it is dry.  Do not rinse with water or any other liquid or you will lose the beneficial effects of the CHG.  If mild skin irritation occurs, do rinse the skin to remove the CHG.  Report this to the nurse at time of admission.  Do not apply lotions, creams, ointments, deodorants or perfumes after using the clothes. Dress in clean clothes before coming to the hospital.    Call your surgeon immediately if you experience any of the following symptoms:  Sore Throat  Shortness of Breath or difficulty breathing  Cough  Chills  Body soreness or muscle pain  Headache  Fever  New loss of taste or smell  Do not arrive for your surgery ill.  Your procedure will need to be rescheduled to another time.  You will need to call your physician before the day of surgery to avoid any unnecessary exposure to hospital staff as well as other patients.

## 2025-07-08 ENCOUNTER — TELEPHONE (OUTPATIENT)
Dept: ORTHOPEDIC SURGERY | Facility: HOSPITAL | Age: 65
End: 2025-07-08
Payer: COMMERCIAL

## 2025-07-08 NOTE — TELEPHONE ENCOUNTER
Risk Factor yes no   Age >75  X   BMI <20 >40  X   Patient History     Chronic Pain (2 or more meds/Pain Management)  X   ETOH (more than 3 drinks Daily)  X   Uncontrolled Depression/Bipolar/Schizoaffective Disorder  X   Arrhythmias--  X   Stent placement/MI  X   DVT/PE X    Pacemaker  X   HTN (uncontrolled or requiring more than 2 medications)  X   CHF/Retained fluids/Edema  X   Stroke with Residual   X   COPD/Asthma  X   ELKIN--Non-compliant with CPAP  X   Diabetes (on insulin or more than 2 meds)         A1C:  X   BPH/Urinary retention (on medication)  X   CKD  X   Home environment and support     Current ambulation status (use of cane, walker, W/C, Multiple falls/weakness)  X   Stairs to enter and throughout home X    Lives Alone  X   Doesn't have support at home  X   Outpatient Screening Assessment    Home needs: (Walker/BSC):  Needs walker  ? Steps 2 steps one way, 12 the other way    Caregiver 24-48hrs post-discharge: Wife    Discharge Plan:   [facility]; if more therapy is needed would like [facility] with [facility]     Prescriptions: Meds to bed    Home medications:   ? Blood thinner/anti-coag therapy--Xarelto   [] BPH or diuretic--   [] BP meds--   [] Pain/Anti-inflammatories--    Pre-op Education:  Educate patient on spinal anesthesia/pain control:  ? patient verbalize understanding  Educate patient on hospital course/timeline:  ?  patient verbalize understanding    Joint Care Class:  ?  yes [] no  Notes:

## 2025-07-10 ENCOUNTER — OFFICE VISIT (OUTPATIENT)
Dept: ORTHOPEDIC SURGERY | Facility: CLINIC | Age: 65
End: 2025-07-10
Payer: COMMERCIAL

## 2025-07-10 VITALS
WEIGHT: 233 LBS | DIASTOLIC BLOOD PRESSURE: 85 MMHG | BODY MASS INDEX: 32.62 KG/M2 | TEMPERATURE: 96 F | OXYGEN SATURATION: 97 % | HEIGHT: 71 IN | HEART RATE: 94 BPM | SYSTOLIC BLOOD PRESSURE: 118 MMHG | RESPIRATION RATE: 14 BRPM

## 2025-07-10 DIAGNOSIS — M16.11 PRIMARY OSTEOARTHRITIS OF RIGHT HIP: Primary | ICD-10-CM

## 2025-07-10 NOTE — H&P
Patient: Ck Arreguin    Date of Admission: 7/18/2025    YOB: 1960    Medical Record Number: [id number]    Admitting Physician: Dr. Navarro Thomason    Reason for Admission: End Stage Right Hip OA    History of Present Illness: 64 y.o. male presents with severe end stage hip osteoarthritis which has not been responsive to the full compliment of conservative measures. Despite conservative attempts, there is still severe, constant activity limiting hip pain. Given the severity of the pain, the patient has elected to proceed with hip replacement.    Allergies:   Allergies   Allergen Reactions    Bactrim [Sulfamethoxazole-Trimethoprim] Rash         Current Medications:  Home Medications:    Current Outpatient Medications on File Prior to Visit   Medication Sig    acetaminophen (TYLENOL) 500 MG tablet Take 1-2 tablets by mouth Every 6 (Six) Hours As Needed for Mild Pain.    Cialis 5 MG tablet Take 1 tablet by mouth Daily As Needed for Erectile Dysfunction. HOLD 48 HOURS PRIOR TO SURGERY    omeprazole (priLOSEC) 40 MG capsule TAKE 1 CAPSULE BY MOUTH EVERY DAY    Xarelto 10 MG tablet TAKE 1 TABLET BY MOUTH EVERY DAY (Patient taking differently: Take 1 tablet by mouth Daily. CALL INTO DR MICHAEL)     No current facility-administered medications on file prior to visit.     PRN Meds:.    PMH:  Past Medical History:   Diagnosis Date    Anticoagulated     HISTORY OF DVT. XARELTO. SEES DR SALAZAR. HAS CALL INTO OFFICE TO CHECK WHEN IT IS TO BE STOPPED    Arthritis     OSTEO    At risk for sleep apnea     STOP BANG 6    BCC (basal cell carcinoma)     REMOVED FROM FACE    Bilateral hip pain     TO HAVE RIGHT HIP REPLACEMENT    Cancer 09/2008    Prostate-    Chronic back pain     DVT (deep venous thrombosis) 08/2016    RLE    DVT (deep venous thrombosis) 09/2018    LLE    ED (erectile dysfunction)     Gastroesophageal reflux disease without esophagitis 08/15/2024    History of foreign travel 02/2018    [place]     "History of snoring     Hyperlipidemia     PER TESTING. NEVER ANY MEDS    Lumbar degenerative disc disease 01/21/2021    Piriformis syndrome, right 01/21/2021    Tear of meniscus of knee     SURGERY TO REPAIR.        PSURGH:  Past Surgical History:   Procedure Laterality Date    KNEE ARTHROSCOPY Right     MENISCUS REPAIR    PROSTATECTOMY  2008       SocialHx:  Social History     Occupational History    Occupation:      Employer: [employer]   Tobacco Use    Smoking status: Some Days     Types: Cigars     Passive exposure: Current    Smokeless tobacco: Never    Tobacco comments:     SOCIALLY.. ABOUT 5 YEARS OR SO   Vaping Use    Vaping status: Never Used   Substance and Sexual Activity    Alcohol use: Yes     Comment: Weekly-SOCIALLY    Drug use: No    Sexual activity: Defer      Social History     Social History Narrative    Not on file       FamHx:  Family History   Problem Relation Age of Onset    No Known Problems Mother     No Known Problems Father     Cholelithiasis Sister     Malig Hyperthermia Neg Hx          Review of Systems:   A 14 point review of systems was performed, pertinent positives discussed above, all other systems are negative    Physical Exam: 64 y.o. male  Vital Signs :   Vitals:    07/10/25 1441   BP: 118/85   Pulse: 94   Resp: 14   Temp: 96 °F (35.6 °C)   TempSrc: Temporal   SpO2: 97%   Weight: 106 kg (233 lb)   Height: 180.3 cm (71\")   PainSc: 2    PainLoc: Hip  Comment: rt     General: Alert and Oriented x 3, No acute distress.  Psych: mood and affect appropriate; recent and remote memory intact  Eyes: conjunctivae clear; pupils equally round and reactive, sclerae antiicteric  CV: RRR  Resp: normal respiratory effort  Skin: no rashes or wounds; normal turgor      Labs:    Preadmission on 07/18/2025   Component Date Value Ref Range Status    Protime 07/03/2025 14.8 (H)  11.7 - 14.2 Seconds Final    INR 07/03/2025 1.16 (H)  0.90 - 1.10 Final   Pre-Admission Testing on " 07/03/2025   Component Date Value Ref Range Status    Glucose 07/03/2025 93  65 - 99 mg/dL Final    BUN 07/03/2025 18.0  8.0 - 23.0 mg/dL Final    Creatinine 07/03/2025 1.09  0.76 - 1.27 mg/dL Final    Sodium 07/03/2025 140  136 - 145 mmol/L Final    Potassium 07/03/2025 3.8  3.5 - 5.2 mmol/L Final    Chloride 07/03/2025 104  98 - 107 mmol/L Final    CO2 07/03/2025 24.1  22.0 - 29.0 mmol/L Final    Calcium 07/03/2025 9.1  8.6 - 10.5 mg/dL Final    BUN/Creatinine Ratio 07/03/2025 16.5  7.0 - 25.0 Final    Anion Gap 07/03/2025 11.9  5.0 - 15.0 mmol/L Final    eGFR 07/03/2025 76.3  >60.0 mL/min/1.73 Final    WBC 07/03/2025 5.48  3.40 - 10.80 10*3/mm3 Final    RBC 07/03/2025 4.85  4.14 - 5.80 10*6/mm3 Final    Hemoglobin 07/03/2025 14.4  13.0 - 17.7 g/dL Final    Hematocrit 07/03/2025 43.5  37.5 - 51.0 % Final    MCV 07/03/2025 89.7  79.0 - 97.0 fL Final    MCH 07/03/2025 29.7  26.6 - 33.0 pg Final    MCHC 07/03/2025 33.1  31.5 - 35.7 g/dL Final    RDW 07/03/2025 12.0 (L)  12.3 - 15.4 % Final    RDW-SD 07/03/2025 39.1  37.0 - 54.0 fl Final    MPV 07/03/2025 10.3  6.0 - 12.0 fL Final    Platelets 07/03/2025 248  140 - 450 10*3/mm3 Final    QT Interval 07/03/2025 418  ms Final    QTC Interval 07/03/2025 431  ms Final     XR Hip With or Without Pelvis 2 - 3 View Right  Result Date: 6/20/2025  Impression: Ordering physician's impression is located in the Encounter Note dated 06/20/25. X-ray performed in the DR room.     XR Knee 3 View Right  Result Date: 6/20/2025  Impression: Ordering physician's impression is located in the Encounter Note dated 06/20/25. X-ray performed in the DR room.     Xrays:  Xrays AP pelvis and a lateral of the Right hip were reviewed demonstrating  End stage hip OA with bone on bone articulation, subchondral cysts and periarticular osteophytes.    Assessment:  End-stage Right hip osteoarthritis. Conservative measures have failed.      Plan:  The plan is to proceed with Right Total Hip Replacement.  The patient voiced understanding of the risks, benefits, and alternative forms of treatment that were discussed with Dr Thomason at the time of scheduling.  Same day home health    Lynette Brown, APRN  7/10/2025

## 2025-07-10 NOTE — H&P (VIEW-ONLY)
Patient: Ck Arreguin    Date of Admission: 7/18/2025    YOB: 1960    Medical Record Number: [id number]    Admitting Physician: Dr. Navarro Thomason    Reason for Admission: End Stage Right Hip OA    History of Present Illness: 64 y.o. male presents with severe end stage hip osteoarthritis which has not been responsive to the full compliment of conservative measures. Despite conservative attempts, there is still severe, constant activity limiting hip pain. Given the severity of the pain, the patient has elected to proceed with hip replacement.    Allergies:   Allergies   Allergen Reactions    Bactrim [Sulfamethoxazole-Trimethoprim] Rash         Current Medications:  Home Medications:    Current Outpatient Medications on File Prior to Visit   Medication Sig    acetaminophen (TYLENOL) 500 MG tablet Take 1-2 tablets by mouth Every 6 (Six) Hours As Needed for Mild Pain.    Cialis 5 MG tablet Take 1 tablet by mouth Daily As Needed for Erectile Dysfunction. HOLD 48 HOURS PRIOR TO SURGERY    omeprazole (priLOSEC) 40 MG capsule TAKE 1 CAPSULE BY MOUTH EVERY DAY    Xarelto 10 MG tablet TAKE 1 TABLET BY MOUTH EVERY DAY (Patient taking differently: Take 1 tablet by mouth Daily. CALL INTO DR MICHAEL)     No current facility-administered medications on file prior to visit.     PRN Meds:.    PMH:  Past Medical History:   Diagnosis Date    Anticoagulated     HISTORY OF DVT. XARELTO. SEES DR SALAZAR. HAS CALL INTO OFFICE TO CHECK WHEN IT IS TO BE STOPPED    Arthritis     OSTEO    At risk for sleep apnea     STOP BANG 6    BCC (basal cell carcinoma)     REMOVED FROM FACE    Bilateral hip pain     TO HAVE RIGHT HIP REPLACEMENT    Cancer 09/2008    Prostate-    Chronic back pain     DVT (deep venous thrombosis) 08/2016    RLE    DVT (deep venous thrombosis) 09/2018    LLE    ED (erectile dysfunction)     Gastroesophageal reflux disease without esophagitis 08/15/2024    History of foreign travel 02/2018    [place]     "History of snoring     Hyperlipidemia     PER TESTING. NEVER ANY MEDS    Lumbar degenerative disc disease 01/21/2021    Piriformis syndrome, right 01/21/2021    Tear of meniscus of knee     SURGERY TO REPAIR.        PSURGH:  Past Surgical History:   Procedure Laterality Date    KNEE ARTHROSCOPY Right     MENISCUS REPAIR    PROSTATECTOMY  2008       SocialHx:  Social History     Occupational History    Occupation:      Employer: [employer]   Tobacco Use    Smoking status: Some Days     Types: Cigars     Passive exposure: Current    Smokeless tobacco: Never    Tobacco comments:     SOCIALLY.. ABOUT 5 YEARS OR SO   Vaping Use    Vaping status: Never Used   Substance and Sexual Activity    Alcohol use: Yes     Comment: Weekly-SOCIALLY    Drug use: No    Sexual activity: Defer      Social History     Social History Narrative    Not on file       FamHx:  Family History   Problem Relation Age of Onset    No Known Problems Mother     No Known Problems Father     Cholelithiasis Sister     Malig Hyperthermia Neg Hx          Review of Systems:   A 14 point review of systems was performed, pertinent positives discussed above, all other systems are negative    Physical Exam: 64 y.o. male  Vital Signs :   Vitals:    07/10/25 1441   BP: 118/85   Pulse: 94   Resp: 14   Temp: 96 °F (35.6 °C)   TempSrc: Temporal   SpO2: 97%   Weight: 106 kg (233 lb)   Height: 180.3 cm (71\")   PainSc: 2    PainLoc: Hip  Comment: rt     General: Alert and Oriented x 3, No acute distress.  Psych: mood and affect appropriate; recent and remote memory intact  Eyes: conjunctivae clear; pupils equally round and reactive, sclerae antiicteric  CV: RRR  Resp: normal respiratory effort  Skin: no rashes or wounds; normal turgor      Labs:    Preadmission on 07/18/2025   Component Date Value Ref Range Status    Protime 07/03/2025 14.8 (H)  11.7 - 14.2 Seconds Final    INR 07/03/2025 1.16 (H)  0.90 - 1.10 Final   Pre-Admission Testing on " 07/03/2025   Component Date Value Ref Range Status    Glucose 07/03/2025 93  65 - 99 mg/dL Final    BUN 07/03/2025 18.0  8.0 - 23.0 mg/dL Final    Creatinine 07/03/2025 1.09  0.76 - 1.27 mg/dL Final    Sodium 07/03/2025 140  136 - 145 mmol/L Final    Potassium 07/03/2025 3.8  3.5 - 5.2 mmol/L Final    Chloride 07/03/2025 104  98 - 107 mmol/L Final    CO2 07/03/2025 24.1  22.0 - 29.0 mmol/L Final    Calcium 07/03/2025 9.1  8.6 - 10.5 mg/dL Final    BUN/Creatinine Ratio 07/03/2025 16.5  7.0 - 25.0 Final    Anion Gap 07/03/2025 11.9  5.0 - 15.0 mmol/L Final    eGFR 07/03/2025 76.3  >60.0 mL/min/1.73 Final    WBC 07/03/2025 5.48  3.40 - 10.80 10*3/mm3 Final    RBC 07/03/2025 4.85  4.14 - 5.80 10*6/mm3 Final    Hemoglobin 07/03/2025 14.4  13.0 - 17.7 g/dL Final    Hematocrit 07/03/2025 43.5  37.5 - 51.0 % Final    MCV 07/03/2025 89.7  79.0 - 97.0 fL Final    MCH 07/03/2025 29.7  26.6 - 33.0 pg Final    MCHC 07/03/2025 33.1  31.5 - 35.7 g/dL Final    RDW 07/03/2025 12.0 (L)  12.3 - 15.4 % Final    RDW-SD 07/03/2025 39.1  37.0 - 54.0 fl Final    MPV 07/03/2025 10.3  6.0 - 12.0 fL Final    Platelets 07/03/2025 248  140 - 450 10*3/mm3 Final    QT Interval 07/03/2025 418  ms Final    QTC Interval 07/03/2025 431  ms Final     XR Hip With or Without Pelvis 2 - 3 View Right  Result Date: 6/20/2025  Impression: Ordering physician's impression is located in the Encounter Note dated 06/20/25. X-ray performed in the DR room.     XR Knee 3 View Right  Result Date: 6/20/2025  Impression: Ordering physician's impression is located in the Encounter Note dated 06/20/25. X-ray performed in the DR room.     Xrays:  Xrays AP pelvis and a lateral of the Right hip were reviewed demonstrating  End stage hip OA with bone on bone articulation, subchondral cysts and periarticular osteophytes.    Assessment:  End-stage Right hip osteoarthritis. Conservative measures have failed.      Plan:  The plan is to proceed with Right Total Hip Replacement.  The patient voiced understanding of the risks, benefits, and alternative forms of treatment that were discussed with Dr Thomason at the time of scheduling.  Same day home health    Lynette Brown, APRN  7/10/2025

## 2025-07-16 NOTE — DISCHARGE PLACEMENT REQUEST
"Ck Chappell (64 y.o. Male)       Date of Birth   1960    Social Security Number       Address   [address] [place] [address]    Home Phone       MRN   [id number]       Religious   None    Marital Status                               Admission Date       Admission Type   Elective    Admitting Provider   Navarro Thomason MD    Attending Provider   Navarro Thomason MD    Department, Room/Bed   [facility], --/--       Discharge Date       Discharge Disposition       Discharge Destination                                 Attending Provider: Navarro Thomason MD    Allergies: Bactrim [Sulfamethoxazole-trimethoprim]    Isolation: None   Infection: None   Code Status: Not on file    Ht: 180.3 cm (71\")   Wt: 106 kg (233 lb)    Admission Cmt: None   Principal Problem: Primary osteoarthritis of right hip [M16.11]                   Active Insurance as of 7/18/2025       Primary Coverage       Payor Plan Insurance Group Employer/Plan Group    [employer] BLUE [employer]OSS [employer] BLUE [employer] BLUE [employer] PPO [id number]       Payor Plan Address Payor Plan Phone Number Payor Plan Fax Number Effective Dates    [address] [phone]  1/1/2020 - None Entered    [facility] [id number]         Subscriber Name Subscriber Birth Date Member ID       KC CHAPPELL 1960 [id number]                     Emergency Contacts        (Rel.) Home Phone Work Phone Mobile Phone    KallieXi (Spouse) -- -- [phone]                "

## 2025-07-17 NOTE — CASE MANAGEMENT/SOCIAL WORK
Continued Stay Note  [facility]     Patient Name: Ck Arreguin  MRN: [id number]  Today's Date: 7/17/2025    Admit Date: (Not on file)    Plan: Home with [facility].  He does need a walker.   Discharge Plan       Row Name 07/17/25 1739       Plan    Plan Home with [facility].  He does need a walker.    Patient/Family in Agreement with Plan yes    Provided Post Acute Provider List? Yes    Post Acute Provider List Home Health    Delivered To Patient    Method of Delivery Telephone                   Discharge Codes    No documentation.                       Shannon Epley, RN

## 2025-07-18 ENCOUNTER — HOSPITAL ENCOUNTER (OUTPATIENT)
Facility: HOSPITAL | Age: 65
Setting detail: HOSPITAL OUTPATIENT SURGERY
Discharge: HOME HEALTH | End: 2025-07-18
Attending: ORTHOPAEDIC SURGERY | Admitting: ORTHOPAEDIC SURGERY
Payer: COMMERCIAL

## 2025-07-18 ENCOUNTER — ANESTHESIA (OUTPATIENT)
Dept: PERIOP | Facility: HOSPITAL | Age: 65
End: 2025-07-18
Payer: COMMERCIAL

## 2025-07-18 ENCOUNTER — APPOINTMENT (OUTPATIENT)
Dept: GENERAL RADIOLOGY | Facility: HOSPITAL | Age: 65
End: 2025-07-18
Payer: COMMERCIAL

## 2025-07-18 ENCOUNTER — ANESTHESIA EVENT (OUTPATIENT)
Dept: PERIOP | Facility: HOSPITAL | Age: 65
End: 2025-07-18
Payer: COMMERCIAL

## 2025-07-18 VITALS
RESPIRATION RATE: 16 BRPM | OXYGEN SATURATION: 98 % | SYSTOLIC BLOOD PRESSURE: 145 MMHG | TEMPERATURE: 97.6 F | DIASTOLIC BLOOD PRESSURE: 80 MMHG | HEART RATE: 64 BPM

## 2025-07-18 DIAGNOSIS — M16.11 PRIMARY OSTEOARTHRITIS OF RIGHT HIP: ICD-10-CM

## 2025-07-18 LAB
ABO GROUP BLD: NORMAL
BLD GP AB SCN SERPL QL: NEGATIVE
RH BLD: POSITIVE
T&S EXPIRATION DATE: NORMAL

## 2025-07-18 PROCEDURE — 25010000002 FENTANYL CITRATE (PF) 100 MCG/2ML SOLUTION

## 2025-07-18 PROCEDURE — 25810000003 LACTATED RINGERS PER 1000 ML: Performed by: STUDENT IN AN ORGANIZED HEALTH CARE EDUCATION/TRAINING PROGRAM

## 2025-07-18 PROCEDURE — 76000 FLUOROSCOPY <1 HR PHYS/QHP: CPT

## 2025-07-18 PROCEDURE — C1776 JOINT DEVICE (IMPLANTABLE): HCPCS | Performed by: ORTHOPAEDIC SURGERY

## 2025-07-18 PROCEDURE — 25010000002 KETOROLAC TROMETHAMINE PER 15 MG

## 2025-07-18 PROCEDURE — 25010000002 MORPHINE PER 10 MG: Performed by: ORTHOPAEDIC SURGERY

## 2025-07-18 PROCEDURE — 97161 PT EVAL LOW COMPLEX 20 MIN: CPT

## 2025-07-18 PROCEDURE — 25010000002 DEXAMETHASONE SODIUM PHOSPHATE 20 MG/5ML SOLUTION

## 2025-07-18 PROCEDURE — 25010000002 FENTANYL CITRATE (PF) 50 MCG/ML SOLUTION

## 2025-07-18 PROCEDURE — 27130 TOTAL HIP ARTHROPLASTY: CPT | Performed by: ORTHOPAEDIC SURGERY

## 2025-07-18 PROCEDURE — 25010000002 HYDROMORPHONE 1 MG/ML SOLUTION

## 2025-07-18 PROCEDURE — 25010000002 PROPOFOL 200 MG/20ML EMULSION

## 2025-07-18 PROCEDURE — 97530 THERAPEUTIC ACTIVITIES: CPT

## 2025-07-18 PROCEDURE — 25010000002 ACETAMINOPHEN 10 MG/ML SOLUTION

## 2025-07-18 PROCEDURE — 25010000002 LIDOCAINE PF 2% 2 % SOLUTION

## 2025-07-18 PROCEDURE — 25010000002 HYDROMORPHONE PER 4 MG

## 2025-07-18 PROCEDURE — 25010000002 VANCOMYCIN 10 G RECONSTITUTED SOLUTION: Performed by: NURSE PRACTITIONER

## 2025-07-18 PROCEDURE — 25010000002 ROPIVACAINE PER 1 MG: Performed by: ORTHOPAEDIC SURGERY

## 2025-07-18 PROCEDURE — 25010000002 EPINEPHRINE 1 MG/ML SOLUTION 30 ML VIAL: Performed by: ORTHOPAEDIC SURGERY

## 2025-07-18 PROCEDURE — 25010000002 ONDANSETRON PER 1 MG

## 2025-07-18 PROCEDURE — 86850 RBC ANTIBODY SCREEN: CPT | Performed by: NURSE PRACTITIONER

## 2025-07-18 PROCEDURE — 73501 X-RAY EXAM HIP UNI 1 VIEW: CPT

## 2025-07-18 PROCEDURE — 25010000002 MAGNESIUM SULFATE PER 500 MG OF MAGNESIUM

## 2025-07-18 PROCEDURE — 25010000002 KETOROLAC TROMETHAMINE PER 15 MG: Performed by: ORTHOPAEDIC SURGERY

## 2025-07-18 PROCEDURE — 25810000003 LACTATED RINGERS SOLUTION: Performed by: NURSE PRACTITIONER

## 2025-07-18 PROCEDURE — 25810000003 LACTATED RINGERS PER 1000 ML

## 2025-07-18 PROCEDURE — 25810000003 SODIUM CHLORIDE 0.9 % SOLUTION: Performed by: NURSE PRACTITIONER

## 2025-07-18 PROCEDURE — 25010000002 SUGAMMADEX 200 MG/2ML SOLUTION

## 2025-07-18 PROCEDURE — 86900 BLOOD TYPING SEROLOGIC ABO: CPT | Performed by: NURSE PRACTITIONER

## 2025-07-18 PROCEDURE — 86901 BLOOD TYPING SEROLOGIC RH(D): CPT | Performed by: NURSE PRACTITIONER

## 2025-07-18 PROCEDURE — 25010000002 CEFAZOLIN PER 500 MG: Performed by: NURSE PRACTITIONER

## 2025-07-18 PROCEDURE — C1713 ANCHOR/SCREW BN/BN,TIS/BN: HCPCS | Performed by: ORTHOPAEDIC SURGERY

## 2025-07-18 DEVICE — OXINIUM FEMORAL HEAD 12/14 TAPER                                    36 MM +0
Type: IMPLANTABLE DEVICE | Site: HIP | Status: FUNCTIONAL
Brand: OXINIUM

## 2025-07-18 DEVICE — KNOTLESS TISSUE CONTROL DEVICE, UNDYED UNIDIRECTIONAL (ANTIBACTERIAL) SYNTHETIC ABSORBABLE DEVICE
Type: IMPLANTABLE DEVICE | Site: HIP | Status: FUNCTIONAL
Brand: STRATAFIX

## 2025-07-18 DEVICE — R3 0 DEGREE XLPE ACETABULAR LINER                                    36MM INNER DIAMETER X OUTER DIAMETER 56MM
Type: IMPLANTABLE DEVICE | Site: HIP | Status: FUNCTIONAL
Brand: R3

## 2025-07-18 DEVICE — REFLECTION SPHERICAL HEAD SCREW 20MM
Type: IMPLANTABLE DEVICE | Site: HIP | Status: FUNCTIONAL
Brand: REFLECTION

## 2025-07-18 DEVICE — CATALYSTEM COLLARED STEM STANDARD OFFSET SIZE 4
Type: IMPLANTABLE DEVICE | Site: HIP | Status: FUNCTIONAL
Brand: CATALYSTEM

## 2025-07-18 DEVICE — KNOTLESS TISSUE CONTROL DEVICE, VIOLET UNIDIRECTIONAL (ANTIBACTERIAL) SYNTHETIC ABSORBABLE DEVICE
Type: IMPLANTABLE DEVICE | Site: HIP | Status: FUNCTIONAL
Brand: STRATAFIX

## 2025-07-18 DEVICE — R3 3 HOLE ACETABULAR SHELL 56MM
Type: IMPLANTABLE DEVICE | Site: HIP | Status: FUNCTIONAL
Brand: R3 ACETABULAR

## 2025-07-18 DEVICE — IMPLANTABLE DEVICE: Type: IMPLANTABLE DEVICE | Status: FUNCTIONAL

## 2025-07-18 RX ORDER — HYDROCODONE BITARTRATE AND ACETAMINOPHEN 7.5; 325 MG/1; MG/1
1 TABLET ORAL EVERY 4 HOURS PRN
Refills: 0 | Status: CANCELLED | OUTPATIENT
Start: 2025-07-18 | End: 2025-07-23

## 2025-07-18 RX ORDER — ATROPINE SULFATE 0.4 MG/ML
0.4 INJECTION, SOLUTION INTRAMUSCULAR; INTRAVENOUS; SUBCUTANEOUS ONCE AS NEEDED
Status: DISCONTINUED | OUTPATIENT
Start: 2025-07-18 | End: 2025-07-18 | Stop reason: HOSPADM

## 2025-07-18 RX ORDER — ONDANSETRON 2 MG/ML
INJECTION INTRAMUSCULAR; INTRAVENOUS AS NEEDED
Status: DISCONTINUED | OUTPATIENT
Start: 2025-07-18 | End: 2025-07-18 | Stop reason: SURG

## 2025-07-18 RX ORDER — VANCOMYCIN/0.9 % SOD CHLORIDE 1.5G/250ML
15 PLASTIC BAG, INJECTION (ML) INTRAVENOUS ONCE
Status: COMPLETED | OUTPATIENT
Start: 2025-07-18 | End: 2025-07-18

## 2025-07-18 RX ORDER — PROMETHAZINE HYDROCHLORIDE 25 MG/1
25 SUPPOSITORY RECTAL ONCE AS NEEDED
Status: DISCONTINUED | OUTPATIENT
Start: 2025-07-18 | End: 2025-07-18 | Stop reason: HOSPADM

## 2025-07-18 RX ORDER — ACETAMINOPHEN 325 MG/1
650 TABLET ORAL EVERY 6 HOURS PRN
Status: DISCONTINUED | OUTPATIENT
Start: 2025-07-18 | End: 2025-07-18 | Stop reason: HOSPADM

## 2025-07-18 RX ORDER — HYDROCODONE BITARTRATE AND ACETAMINOPHEN 7.5; 325 MG/1; MG/1
2 TABLET ORAL EVERY 4 HOURS PRN
Refills: 0 | Status: CANCELLED | OUTPATIENT
Start: 2025-07-18 | End: 2025-07-23

## 2025-07-18 RX ORDER — ONDANSETRON 2 MG/ML
4 INJECTION INTRAMUSCULAR; INTRAVENOUS ONCE AS NEEDED
Status: DISCONTINUED | OUTPATIENT
Start: 2025-07-18 | End: 2025-07-18 | Stop reason: HOSPADM

## 2025-07-18 RX ORDER — KETAMINE HCL IN NACL, ISO-OSM 100MG/10ML
SYRINGE (ML) INJECTION AS NEEDED
Status: DISCONTINUED | OUTPATIENT
Start: 2025-07-18 | End: 2025-07-18 | Stop reason: SURG

## 2025-07-18 RX ORDER — ONDANSETRON 4 MG/1
4 TABLET, FILM COATED ORAL EVERY 8 HOURS PRN
Qty: 10 TABLET | Refills: 0 | Status: SHIPPED | OUTPATIENT
Start: 2025-07-18

## 2025-07-18 RX ORDER — NALOXONE HCL 0.4 MG/ML
0.2 VIAL (ML) INJECTION AS NEEDED
Status: DISCONTINUED | OUTPATIENT
Start: 2025-07-18 | End: 2025-07-18 | Stop reason: HOSPADM

## 2025-07-18 RX ORDER — FLUMAZENIL 0.1 MG/ML
0.2 INJECTION INTRAVENOUS AS NEEDED
Status: DISCONTINUED | OUTPATIENT
Start: 2025-07-18 | End: 2025-07-18 | Stop reason: HOSPADM

## 2025-07-18 RX ORDER — SODIUM CHLORIDE 0.9 % (FLUSH) 0.9 %
3-10 SYRINGE (ML) INJECTION AS NEEDED
Status: DISCONTINUED | OUTPATIENT
Start: 2025-07-18 | End: 2025-07-18 | Stop reason: HOSPADM

## 2025-07-18 RX ORDER — OXYCODONE AND ACETAMINOPHEN 7.5; 325 MG/1; MG/1
1 TABLET ORAL EVERY 4 HOURS PRN
Status: DISCONTINUED | OUTPATIENT
Start: 2025-07-18 | End: 2025-07-18 | Stop reason: HOSPADM

## 2025-07-18 RX ORDER — FENTANYL CITRATE 50 UG/ML
INJECTION, SOLUTION INTRAMUSCULAR; INTRAVENOUS AS NEEDED
Status: DISCONTINUED | OUTPATIENT
Start: 2025-07-18 | End: 2025-07-18 | Stop reason: SURG

## 2025-07-18 RX ORDER — EPHEDRINE SULFATE 50 MG/ML
5 INJECTION, SOLUTION INTRAVENOUS ONCE AS NEEDED
Status: DISCONTINUED | OUTPATIENT
Start: 2025-07-18 | End: 2025-07-18 | Stop reason: HOSPADM

## 2025-07-18 RX ORDER — SODIUM CHLORIDE, SODIUM LACTATE, POTASSIUM CHLORIDE, CALCIUM CHLORIDE 600; 310; 30; 20 MG/100ML; MG/100ML; MG/100ML; MG/100ML
INJECTION, SOLUTION INTRAVENOUS CONTINUOUS PRN
Status: DISCONTINUED | OUTPATIENT
Start: 2025-07-18 | End: 2025-07-18 | Stop reason: SURG

## 2025-07-18 RX ORDER — KETOROLAC TROMETHAMINE 30 MG/ML
INJECTION, SOLUTION INTRAMUSCULAR; INTRAVENOUS AS NEEDED
Status: DISCONTINUED | OUTPATIENT
Start: 2025-07-18 | End: 2025-07-18 | Stop reason: SURG

## 2025-07-18 RX ORDER — PROMETHAZINE HYDROCHLORIDE 25 MG/1
25 TABLET ORAL ONCE AS NEEDED
Status: DISCONTINUED | OUTPATIENT
Start: 2025-07-18 | End: 2025-07-18 | Stop reason: HOSPADM

## 2025-07-18 RX ORDER — PROPOFOL 10 MG/ML
INJECTION, EMULSION INTRAVENOUS AS NEEDED
Status: DISCONTINUED | OUTPATIENT
Start: 2025-07-18 | End: 2025-07-18 | Stop reason: SURG

## 2025-07-18 RX ORDER — FENTANYL CITRATE 50 UG/ML
50 INJECTION, SOLUTION INTRAMUSCULAR; INTRAVENOUS ONCE AS NEEDED
Status: DISCONTINUED | OUTPATIENT
Start: 2025-07-18 | End: 2025-07-18 | Stop reason: HOSPADM

## 2025-07-18 RX ORDER — LABETALOL HYDROCHLORIDE 5 MG/ML
5 INJECTION, SOLUTION INTRAVENOUS
Status: DISCONTINUED | OUTPATIENT
Start: 2025-07-18 | End: 2025-07-18 | Stop reason: HOSPADM

## 2025-07-18 RX ORDER — ACETAMINOPHEN 10 MG/ML
INJECTION, SOLUTION INTRAVENOUS AS NEEDED
Status: DISCONTINUED | OUTPATIENT
Start: 2025-07-18 | End: 2025-07-18 | Stop reason: SURG

## 2025-07-18 RX ORDER — HYDROCODONE BITARTRATE AND ACETAMINOPHEN 7.5; 325 MG/1; MG/1
1-2 TABLET ORAL
Qty: 40 TABLET | Refills: 0 | Status: SHIPPED | OUTPATIENT
Start: 2025-07-18 | End: 2025-07-24 | Stop reason: SDUPTHER

## 2025-07-18 RX ORDER — ROCURONIUM BROMIDE 10 MG/ML
INJECTION, SOLUTION INTRAVENOUS AS NEEDED
Status: DISCONTINUED | OUTPATIENT
Start: 2025-07-18 | End: 2025-07-18 | Stop reason: SURG

## 2025-07-18 RX ORDER — IPRATROPIUM BROMIDE AND ALBUTEROL SULFATE 2.5; .5 MG/3ML; MG/3ML
3 SOLUTION RESPIRATORY (INHALATION) ONCE AS NEEDED
Status: DISCONTINUED | OUTPATIENT
Start: 2025-07-18 | End: 2025-07-18 | Stop reason: HOSPADM

## 2025-07-18 RX ORDER — LIDOCAINE HYDROCHLORIDE 20 MG/ML
INJECTION, SOLUTION EPIDURAL; INFILTRATION; INTRACAUDAL; PERINEURAL AS NEEDED
Status: DISCONTINUED | OUTPATIENT
Start: 2025-07-18 | End: 2025-07-18 | Stop reason: SURG

## 2025-07-18 RX ORDER — FENTANYL CITRATE 50 UG/ML
50 INJECTION, SOLUTION INTRAMUSCULAR; INTRAVENOUS
Status: DISCONTINUED | OUTPATIENT
Start: 2025-07-18 | End: 2025-07-18 | Stop reason: HOSPADM

## 2025-07-18 RX ORDER — MIDAZOLAM HYDROCHLORIDE 1 MG/ML
1 INJECTION, SOLUTION INTRAMUSCULAR; INTRAVENOUS
Status: DISCONTINUED | OUTPATIENT
Start: 2025-07-18 | End: 2025-07-18 | Stop reason: HOSPADM

## 2025-07-18 RX ORDER — MELOXICAM 15 MG/1
15 TABLET ORAL ONCE
Status: COMPLETED | OUTPATIENT
Start: 2025-07-18 | End: 2025-07-18

## 2025-07-18 RX ORDER — DEXAMETHASONE SODIUM PHOSPHATE 4 MG/ML
INJECTION, SOLUTION INTRA-ARTICULAR; INTRALESIONAL; INTRAMUSCULAR; INTRAVENOUS; SOFT TISSUE AS NEEDED
Status: DISCONTINUED | OUTPATIENT
Start: 2025-07-18 | End: 2025-07-18 | Stop reason: SURG

## 2025-07-18 RX ORDER — DROPERIDOL 2.5 MG/ML
0.62 INJECTION, SOLUTION INTRAMUSCULAR; INTRAVENOUS
Status: DISCONTINUED | OUTPATIENT
Start: 2025-07-18 | End: 2025-07-18 | Stop reason: HOSPADM

## 2025-07-18 RX ORDER — HYDROCODONE BITARTRATE AND ACETAMINOPHEN 5; 325 MG/1; MG/1
1 TABLET ORAL ONCE AS NEEDED
Status: DISCONTINUED | OUTPATIENT
Start: 2025-07-18 | End: 2025-07-18 | Stop reason: HOSPADM

## 2025-07-18 RX ORDER — MAGNESIUM SULFATE HEPTAHYDRATE 500 MG/ML
INJECTION, SOLUTION INTRAMUSCULAR; INTRAVENOUS AS NEEDED
Status: DISCONTINUED | OUTPATIENT
Start: 2025-07-18 | End: 2025-07-18 | Stop reason: SURG

## 2025-07-18 RX ORDER — SODIUM CHLORIDE 0.9 % (FLUSH) 0.9 %
3 SYRINGE (ML) INJECTION EVERY 12 HOURS SCHEDULED
Status: DISCONTINUED | OUTPATIENT
Start: 2025-07-18 | End: 2025-07-18 | Stop reason: HOSPADM

## 2025-07-18 RX ORDER — MELOXICAM 15 MG/1
15 TABLET ORAL DAILY
Qty: 14 TABLET | Refills: 0 | Status: SHIPPED | OUTPATIENT
Start: 2025-07-18

## 2025-07-18 RX ORDER — TRANEXAMIC ACID 100 MG/ML
INJECTION, SOLUTION INTRAVENOUS AS NEEDED
Status: DISCONTINUED | OUTPATIENT
Start: 2025-07-18 | End: 2025-07-18 | Stop reason: SURG

## 2025-07-18 RX ORDER — HYDROMORPHONE HYDROCHLORIDE 1 MG/ML
0.5 INJECTION, SOLUTION INTRAMUSCULAR; INTRAVENOUS; SUBCUTANEOUS
Status: DISCONTINUED | OUTPATIENT
Start: 2025-07-18 | End: 2025-07-18 | Stop reason: HOSPADM

## 2025-07-18 RX ORDER — MAGNESIUM HYDROXIDE 1200 MG/15ML
LIQUID ORAL AS NEEDED
Status: DISCONTINUED | OUTPATIENT
Start: 2025-07-18 | End: 2025-07-18 | Stop reason: HOSPADM

## 2025-07-18 RX ORDER — LIDOCAINE HYDROCHLORIDE 10 MG/ML
0.5 INJECTION, SOLUTION INFILTRATION; PERINEURAL ONCE AS NEEDED
Status: DISCONTINUED | OUTPATIENT
Start: 2025-07-18 | End: 2025-07-18 | Stop reason: HOSPADM

## 2025-07-18 RX ORDER — PANTOPRAZOLE SODIUM 40 MG/1
40 TABLET, DELAYED RELEASE ORAL DAILY
Qty: 14 TABLET | Refills: 0 | Status: SHIPPED | OUTPATIENT
Start: 2025-07-18 | End: 2025-08-01

## 2025-07-18 RX ORDER — CHLORHEXIDINE GLUCONATE 500 MG/1
CLOTH TOPICAL 2 TIMES DAILY
Status: DISCONTINUED | OUTPATIENT
Start: 2025-07-18 | End: 2025-07-18 | Stop reason: HOSPADM

## 2025-07-18 RX ORDER — PREGABALIN 75 MG/1
150 CAPSULE ORAL ONCE
Status: COMPLETED | OUTPATIENT
Start: 2025-07-18 | End: 2025-07-18

## 2025-07-18 RX ORDER — POLYETHYLENE GLYCOL 3350 17 G/17G
17 POWDER, FOR SOLUTION ORAL 2 TIMES DAILY
Qty: 238 G | Refills: 0 | Status: SHIPPED | OUTPATIENT
Start: 2025-07-18 | End: 2025-07-25

## 2025-07-18 RX ORDER — HYDROCODONE BITARTRATE AND ACETAMINOPHEN 7.5; 325 MG/1; MG/1
1 TABLET ORAL EVERY 4 HOURS PRN
Refills: 0 | Status: DISCONTINUED | OUTPATIENT
Start: 2025-07-18 | End: 2025-07-18 | Stop reason: HOSPADM

## 2025-07-18 RX ORDER — PROMETHAZINE HYDROCHLORIDE 25 MG/1
12.5 TABLET ORAL EVERY 4 HOURS PRN
Status: DISCONTINUED | OUTPATIENT
Start: 2025-07-18 | End: 2025-07-18 | Stop reason: HOSPADM

## 2025-07-18 RX ORDER — HYDRALAZINE HYDROCHLORIDE 20 MG/ML
5 INJECTION INTRAMUSCULAR; INTRAVENOUS
Status: DISCONTINUED | OUTPATIENT
Start: 2025-07-18 | End: 2025-07-18 | Stop reason: HOSPADM

## 2025-07-18 RX ORDER — ONDANSETRON 4 MG/1
4 TABLET, ORALLY DISINTEGRATING ORAL EVERY 6 HOURS PRN
Status: DISCONTINUED | OUTPATIENT
Start: 2025-07-18 | End: 2025-07-18 | Stop reason: HOSPADM

## 2025-07-18 RX ORDER — DIPHENHYDRAMINE HYDROCHLORIDE 50 MG/ML
12.5 INJECTION, SOLUTION INTRAMUSCULAR; INTRAVENOUS
Status: DISCONTINUED | OUTPATIENT
Start: 2025-07-18 | End: 2025-07-18 | Stop reason: HOSPADM

## 2025-07-18 RX ORDER — SODIUM CHLORIDE, SODIUM LACTATE, POTASSIUM CHLORIDE, CALCIUM CHLORIDE 600; 310; 30; 20 MG/100ML; MG/100ML; MG/100ML; MG/100ML
9 INJECTION, SOLUTION INTRAVENOUS CONTINUOUS
Status: DISCONTINUED | OUTPATIENT
Start: 2025-07-18 | End: 2025-07-18 | Stop reason: HOSPADM

## 2025-07-18 RX ADMIN — TRANEXAMIC ACID 1000 MG: 100 INJECTION INTRAVENOUS at 08:35

## 2025-07-18 RX ADMIN — ONDANSETRON 4 MG: 2 INJECTION, SOLUTION INTRAMUSCULAR; INTRAVENOUS at 09:48

## 2025-07-18 RX ADMIN — ACETAMINOPHEN 1000 MG: 1000 INJECTION INTRAVENOUS at 08:35

## 2025-07-18 RX ADMIN — DEXAMETHASONE SODIUM PHOSPHATE 8 MG: 4 INJECTION, SOLUTION INTRAMUSCULAR; INTRAVENOUS at 08:17

## 2025-07-18 RX ADMIN — MELOXICAM 15 MG: 15 TABLET ORAL at 07:20

## 2025-07-18 RX ADMIN — PROPOFOL 40 MG: 10 INJECTION, EMULSION INTRAVENOUS at 09:01

## 2025-07-18 RX ADMIN — MAGNESIUM SULFATE HEPTAHYDRATE 2 G: 500 INJECTION, SOLUTION INTRAMUSCULAR; INTRAVENOUS at 08:29

## 2025-07-18 RX ADMIN — FENTANYL CITRATE 50 MCG: 50 INJECTION, SOLUTION INTRAMUSCULAR; INTRAVENOUS at 09:01

## 2025-07-18 RX ADMIN — FENTANYL CITRATE 100 MCG: 50 INJECTION, SOLUTION INTRAMUSCULAR; INTRAVENOUS at 08:17

## 2025-07-18 RX ADMIN — Medication 25 MG: at 08:40

## 2025-07-18 RX ADMIN — SUGAMMADEX 200 MG: 100 INJECTION, SOLUTION INTRAVENOUS at 09:49

## 2025-07-18 RX ADMIN — HYDROMORPHONE HYDROCHLORIDE 0.5 MG: 1 INJECTION, SOLUTION INTRAMUSCULAR; INTRAVENOUS; SUBCUTANEOUS at 09:56

## 2025-07-18 RX ADMIN — SODIUM CHLORIDE, SODIUM LACTATE, POTASSIUM CHLORIDE, AND CALCIUM CHLORIDE: 600; 310; 30; 20 INJECTION, SOLUTION INTRAVENOUS at 08:17

## 2025-07-18 RX ADMIN — HYDROMORPHONE HYDROCHLORIDE 0.5 MG: 1 INJECTION, SOLUTION INTRAMUSCULAR; INTRAVENOUS; SUBCUTANEOUS at 10:03

## 2025-07-18 RX ADMIN — SODIUM CHLORIDE 1500 MG: 9 INJECTION, SOLUTION INTRAVENOUS at 07:02

## 2025-07-18 RX ADMIN — ROCURONIUM BROMIDE 100 MG: 10 INJECTION, SOLUTION INTRAVENOUS at 08:17

## 2025-07-18 RX ADMIN — SUGAMMADEX 200 MG: 100 INJECTION, SOLUTION INTRAVENOUS at 09:48

## 2025-07-18 RX ADMIN — HYDROMORPHONE HYDROCHLORIDE 0.5 MG: 1 INJECTION, SOLUTION INTRAMUSCULAR; INTRAVENOUS; SUBCUTANEOUS at 09:49

## 2025-07-18 RX ADMIN — CEFAZOLIN 2 G: 2 INJECTION, POWDER, FOR SOLUTION INTRAMUSCULAR; INTRAVENOUS at 08:08

## 2025-07-18 RX ADMIN — LIDOCAINE HYDROCHLORIDE 100 MG: 20 INJECTION, SOLUTION EPIDURAL; INFILTRATION; INTRACAUDAL; PERINEURAL at 08:17

## 2025-07-18 RX ADMIN — SODIUM CHLORIDE, POTASSIUM CHLORIDE, SODIUM LACTATE AND CALCIUM CHLORIDE 500 ML: 600; 310; 30; 20 INJECTION, SOLUTION INTRAVENOUS at 07:22

## 2025-07-18 RX ADMIN — TRANEXAMIC ACID 1000 MG: 100 INJECTION INTRAVENOUS at 09:28

## 2025-07-18 RX ADMIN — KETOROLAC TROMETHAMINE 30 MG: 30 INJECTION, SOLUTION INTRAMUSCULAR; INTRAVENOUS at 09:48

## 2025-07-18 RX ADMIN — SODIUM CHLORIDE, POTASSIUM CHLORIDE, SODIUM LACTATE AND CALCIUM CHLORIDE 9 ML/HR: 600; 310; 30; 20 INJECTION, SOLUTION INTRAVENOUS at 07:43

## 2025-07-18 RX ADMIN — SODIUM CHLORIDE, SODIUM LACTATE, POTASSIUM CHLORIDE, AND CALCIUM CHLORIDE: 600; 310; 30; 20 INJECTION, SOLUTION INTRAVENOUS at 08:51

## 2025-07-18 RX ADMIN — FENTANYL CITRATE 50 MCG: 50 INJECTION, SOLUTION INTRAMUSCULAR; INTRAVENOUS at 10:19

## 2025-07-18 RX ADMIN — HYDROCODONE BITARTRATE AND ACETAMINOPHEN 1 TABLET: 7.5; 325 TABLET ORAL at 10:03

## 2025-07-18 RX ADMIN — PROPOFOL 200 MG: 10 INJECTION, EMULSION INTRAVENOUS at 08:17

## 2025-07-18 RX ADMIN — PREGABALIN 150 MG: 75 CAPSULE ORAL at 07:20

## 2025-07-18 RX ADMIN — Medication 25 MG: at 08:35

## 2025-07-18 NOTE — PLAN OF CARE
Goal Outcome Evaluation:  Plan of Care Reviewed With: patient        Progress: improving     Pt is a 65 y/o M POD0 R Anterior BEATRIZ. Pt reports being independent at baseline without AD, owns a RW and lives with his spouse in a home with 2 GABE or a flight pending on their entrance. Today, pt was SBA for STS/transfers w/ RW and CG/SBA for ambulation of 80' w/ RW demoing typical post-op antalgic gait mechanics in a step-to pattern. No knee buckling, dizziness, LOB or SOB. Stairs were simulated via standing MIP x5 w/ RW and CGA, demoing good foot clearance, recall of sequencing and balance.  Pt performed BEATRIZ protocol ther-ex for 10 reps prior to mobility. Pt/family edu on HEP, icing, positioning, activity recs, safety and sequencing w/ gait/stairs, use of gait belt, energy conservation and falls prevention; they v/u. Pt/family report no further concerns or questions and feel ready for DC home later today. No further acute skilled PT services required. Discussed w/ RN following session. Anticipate home w/ HH PT at [facility].       Anticipated Discharge Disposition (PT): home with home health, home with assist

## 2025-07-18 NOTE — ANESTHESIA PROCEDURE NOTES
Airway  Reason: elective    Date/Time: 7/18/2025 8:22 AM  Airway not difficult    General Information and Staff    Patient location during procedure: OR  Anesthesiologist: Bong Escoto MD  CRNA/CAA: Edelmira Wing CRNA    Indications and Patient Condition  Indications for airway management: airway protection    Preoxygenated: yes    Mask difficulty assessment: 3 - difficult mask (inadequate, unstable or two providers) +/- NMBA    Final Airway Details    Final airway type: endotracheal airway      Successful airway: ETT  Cuffed: yes   Successful intubation technique: direct laryngoscopy  Adjuncts used in placement: intubating stylet  Blade: Galo  Blade size: 4  ETT size (mm): 7.5  Cormack-Lehane Classification: grade IIb - view of arytenoids or posterior of glottis only  Placement verified by: chest auscultation and capnometry   Cuff volume (mL): 8  Measured from: lips  ETT/EBT  to lips (cm): 21  Number of attempts at approach: 1  Assessment: lips, teeth, and gum same as pre-op and atraumatic intubation

## 2025-07-18 NOTE — THERAPY EVALUATION
Patient Name: Ck Arreguin  : 1960    MRN: [id number]                              Today's Date: 2025       Admit Date: 2025    Visit Dx:     ICD-10-CM ICD-9-CM   1. Primary osteoarthritis of right hip  M16.11 715.15     Patient Active Problem List   Diagnosis    DVT of popliteal vein    Factor V Leiden mutation    Recurrent deep vein thrombosis (DVT)    Lumbar degenerative disc disease    Spinal stenosis, lumbar region, without neurogenic claudication    Piriformis syndrome, right    Prostate cancer    Annual physical exam    Gastroesophageal reflux disease without esophagitis    Primary osteoarthritis of right hip     Past Medical History:   Diagnosis Date    Anticoagulated     HISTORY OF DVT. XARELTO. SEES DR SALAZAR. HAS CALL INTO OFFICE TO CHECK WHEN IT IS TO BE STOPPED    Arthritis     OSTEO    At risk for sleep apnea     STOP BANG 6    BCC (basal cell carcinoma)     REMOVED FROM FACE    Bilateral hip pain     TO HAVE RIGHT HIP REPLACEMENT    Cancer 2008    Prostate-    Chronic back pain     DVT (deep venous thrombosis) 2016    RLE    DVT (deep venous thrombosis) 2018    LLE    ED (erectile dysfunction)     Gastroesophageal reflux disease without esophagitis 08/15/2024    History of foreign travel 2018    [place]    History of snoring     Hyperlipidemia     PER TESTING. NEVER ANY MEDS    Lumbar degenerative disc disease 2021    Piriformis syndrome, right 2021    Tear of meniscus of knee     SURGERY TO REPAIR.     Past Surgical History:   Procedure Laterality Date    KNEE ARTHROSCOPY Right     MENISCUS REPAIR    PROSTATECTOMY        General Information       Row Name 25 1646          Physical Therapy Time and Intention    Document Type evaluation  -MG     Mode of Treatment individual therapy;physical therapy  -MG       Row Name 25 7128          General Information    Patient Profile Reviewed yes  -MG     Prior Level of Function independent:  No AD.  Owns RW.  -MG     Existing Precautions/Restrictions fall  -MG     Barriers to Rehab none identified  -MG       Row Name 07/18/25 1647          Living Environment    Current Living Arrangements home  -MG     People in Home spouse  -MG       Row Name 07/18/25 1647          Home Main Entrance    Number of Stairs, Main Entrance two;twelve  2 GABE or a flight pending what entrance they use.  -MG     Stair Railings, Main Entrance railings safe and in good condition;railing on right side (ascending)  -MG       Row Name 07/18/25 1647          Stairs Within Home, Primary    Number of Stairs, Within Home, Primary none  -MG       Row Name 07/18/25 1647          Cognition    Orientation Status (Cognition) oriented x 4  -MG       Row Name 07/18/25 1647          Safety Issues/Impairments Affecting Functional Mobility    Impairments Affecting Function (Mobility) balance;endurance/activity tolerance;pain;range of motion (ROM);strength  -MG     Comment, Safety Issues/Impairments (Mobility) Gait belt and non-skid socks donned.  -MG               User Key  (r) = Recorded By, (t) = Taken By, (c) = Cosigned By      Initials Name Provider Type    MG Amparo Howell, PT Physical Therapist                   Mobility       Row Name 07/18/25 1649          Sit-Stand Transfer    Sit-Stand Wirt (Transfers) standby assist;verbal cues  -MG     Assistive Device (Sit-Stand Transfers) walker, front-wheeled  -MG     Comment, (Sit-Stand Transfer) Cues for hand placement and sequencing.  -MG       Row Name 07/18/25 1649          Gait/Stairs (Locomotion)    Wirt Level (Gait) contact guard;standby assist;verbal cues  -MG     Assistive Device (Gait) walker, front-wheeled  -MG     Patient was able to Ambulate yes  -MG     Distance in Feet (Gait) 80  -MG     Deviations/Abnormal Patterns (Gait) gait speed decreased;antalgic;stride length decreased  -MG     Bilateral Gait Deviations forward flexed posture;heel strike decreased  -MG     Right  Sided Gait Deviations weight shift ability decreased  -MG     Comment, (Gait/Stairs) Step-to pattern. Cues for posture and sequencing. No overt LOB, buckling, dizziness or SOB. Stairs were simulated via standing MIP x5 w/ RW and CGA, demoing good foot clearance, recall of sequencing and balance.  -MG       Row Name 07/18/25 1649          Mobility    Extremity Weight-bearing Status right lower extremity  -MG     Right Lower Extremity (Weight-bearing Status) weight-bearing as tolerated (WBAT)  -MG               User Key  (r) = Recorded By, (t) = Taken By, (c) = Cosigned By      Initials Name Provider Type    MG Amparo Howell, PT Physical Therapist                   Obj/Interventions       Row Name 07/18/25 1650          Range of Motion Comprehensive    General Range of Motion other (see comments)  -MG     Comment, General Range of Motion LLE, R knee/ankle WFL. R hip limited by appropriate post-op pain/stiffness.  -MG       Row Name 07/18/25 1650          Strength Comprehensive (MMT)    General Manual Muscle Testing (MMT) Assessment other (see comments)  -MG     Comment, General Manual Muscle Testing (MMT) Assessment Typical generalized post-op weakness. Grossly 4+/5.  -MG       Row Name 07/18/25 1650          Motor Skills    Therapeutic Exercise other (see comments)  BEATRIZ protocol x10  -MG       Row Name 07/18/25 1650          Sensory Assessment (Somatosensory)    Sensory Assessment (Somatosensory) sensation intact  -MG               User Key  (r) = Recorded By, (t) = Taken By, (c) = Cosigned By      Initials Name Provider Type    MG Amparo Howell, PT Physical Therapist                   Goals/Plan    No documentation.                  Clinical Impression       Row Name 07/18/25 1654          Pain    Pretreatment Pain Rating 2/10  -MG     Posttreatment Pain Rating 2/10  -MG     Pain Location hip  -MG     Pain Side/Orientation right  -MG     Pain Management Interventions activity modification encouraged;exercise or  physical activity utilized;positioning techniques utilized;premedicated for activity  -MG     Response to Pain Interventions activity participation with tolerable pain  -MG       Row Name 07/18/25 8335          Plan of Care Review    Plan of Care Reviewed With patient  -MG     Progress improving  -MG     Outcome Evaluation Pt is a 65 y/o M POD0 R Anterior BEATRIZ. Pt reports being independent at baseline without AD, owns a RW and lives with his spouse in a home with 2 GABE or a flight pending on their entrance. Today, pt was SBA for STS/transfers w/ RW and CG/SBA for ambulation of 80' w/ RW demoing typical post-op antalgic gait mechanics in a step-to pattern. No knee buckling, dizziness, LOB or SOB. Stairs were simulated via standing MIP x5 w/ RW and CGA, demoing good foot clearance, recall of sequencing and balance.  Pt performed BEATRIZ protocol ther-ex for 10 reps prior to mobility. Pt/family edu on HEP, icing, positioning, activity recs, safety and sequencing w/ gait/stairs, use of gait belt, energy conservation and falls prevention; they v/u. Pt/family report no further concerns or questions and feel ready for [place] home later today. No further acute skilled PT services required. Discussed w/ RN following session. Anticipate home w/ HH PT at [place].  -MG       Row Name 07/18/25 0392          Therapy Assessment/Plan (PT)    Rehab Potential (PT) good  -MG     Criteria for Skilled Interventions Met (PT) yes;meets criteria  -MG     Therapy Frequency (PT) daily  -MG       Row Name 07/18/25 6160          Positioning and Restraints    Pre-Treatment Position sitting in chair/recliner  -MG     Post Treatment Position chair  -MG     In Chair notified nsg;reclined;call light within reach;encouraged to call for assist;with family/caregiver;legs elevated;RLE elevated  -MG               User Key  (r) = Recorded By, (t) = Taken By, (c) = Cosigned By      Initials Name Provider Type    Amparo Chou, PT Physical Therapist                    Outcome Measures       Row Name 07/18/25 1654          How much help from another person do you currently need...    Turning from your back to your side while in flat bed without using bedrails? 4  -MG     Moving from lying on back to sitting on the side of a flat bed without bedrails? 3  -MG     Moving to and from a bed to a chair (including a wheelchair)? 3  -MG     Standing up from a chair using your arms (e.g., wheelchair, bedside chair)? 3  -MG     Climbing 3-5 steps with a railing? 3  -MG     To walk in hospital room? 3  -MG     AM-PAC 6 Clicks Score (PT) 19  -MG     Highest Level of Mobility Goal Walk 10 Steps or More-6  -MG       Row Name 07/18/25 1654          Functional Assessment    Outcome Measure Options AM-PAC 6 Clicks Basic Mobility (PT)  -MG               User Key  (r) = Recorded By, (t) = Taken By, (c) = Cosigned By      Initials Name Provider Type    MG Amparo Howell, PT Physical Therapist                                 Physical Therapy Education       Title: PT OT SLP Therapies (Done)       Topic: Physical Therapy (Done)       Point: Mobility training (Done)       Learning Progress Summary            Patient Acceptance, E,TB,D, VU by MG at 7/18/2025 1655   Significant Other Acceptance, E,TB,D, VU by MG at 7/18/2025 1655                      Point: Home exercise program (Done)       Learning Progress Summary            Patient Acceptance, E,TB,D, VU by MG at 7/18/2025 1655   Significant Other Acceptance, E,TB,D, VU by MG at 7/18/2025 1655                      Point: Body mechanics (Done)       Learning Progress Summary            Patient Acceptance, E,TB,D, VU by MG at 7/18/2025 1655   Significant Other Acceptance, E,TB,D, VU by MG at 7/18/2025 1655                      Point: Precautions (Done)       Learning Progress Summary            Patient Acceptance, E,TB,D, VU by MG at 7/18/2025 1655   Significant Other Acceptance, E,TB,D, VU by MG at 7/18/2025 1655                                       User Key       Initials Effective Dates Name Provider Type Discipline    MG 05/24/22 -  Amparo Howell PT Physical Therapist PT                  PT Recommendation and Plan     Progress: improving  Outcome Evaluation: Pt is a 63 y/o M POD0 R Anterior BEATRIZ. Pt reports being independent at baseline without AD, owns a RW and lives with his spouse in a home with 2 GABE or a flight pending on their entrance. Today, pt was SBA for STS/transfers w/ RW and CG/SBA for ambulation of 80' w/ RW demoing typical post-op antalgic gait mechanics in a step-to pattern. No knee buckling, dizziness, LOB or SOB. Stairs were simulated via standing MIP x5 w/ RW and CGA, demoing good foot clearance, recall of sequencing and balance.  Pt performed BEATRIZ protocol ther-ex for 10 reps prior to mobility. Pt/family edu on HEP, icing, positioning, activity recs, safety and sequencing w/ gait/stairs, use of gait belt, energy conservation and falls prevention; they v/u. Pt/family report no further concerns or questions and feel ready for [place] home later today. No further acute skilled PT services required. Discussed w/ RN following session. Anticipate home w/ HH PT at [place].     Time Calculation:         PT Charges       Row Name 07/18/25 1655             Time Calculation    Start Time 1158  -MG      Stop Time 1220  -MG      Time Calculation (min) 22 min  -MG      PT Received On 07/18/25  -MG      PT - Next Appointment 07/19/25  -MG         Time Calculation- PT    Total Timed Code Minutes- PT 10 minute(s)  -MG                User Key  (r) = Recorded By, (t) = Taken By, (c) = Cosigned By      Initials Name Provider Type    MG Amparo Howell PT Physical Therapist                  Therapy Charges for Today       Code Description Service Date Service Provider Modifiers Qty    43267378062 HC PT EVAL LOW COMPLEXITY 2 7/18/2025 Amparo Howell, PT GP 1    90735014540 HC PT THERAPEUTIC ACT EA 15 MIN 7/18/2025 Amparo Howell, PT GP 1            PT  G-Codes  Outcome Measure Options: AM-PAC 6 Clicks Basic Mobility (PT)  AM-PAC 6 Clicks Score (PT): 19  PT Discharge Summary  Anticipated Discharge Disposition (PT): home with home health, home with assist    Amparo Howell, PT  7/18/2025

## 2025-07-18 NOTE — ANESTHESIA POSTPROCEDURE EVALUATION
Patient: Ck Arreguin    Procedure Summary       Date: 07/18/25 Room / Location: [facility] [address] [place] [address] [facility]    Anesthesia Start: 0811 Anesthesia Stop: 1000    Procedure: TOTAL HIP ARTHROPLASTY ANTERIOR WITH HANA TABLE (Right: Hip) Diagnosis:       Primary osteoarthritis of right hip      (Primary osteoarthritis of right hip [M16.11])    Surgeons: Navarro Thomason MD Provider: Bong Escoto MD    Anesthesia Type: general ASA Status: 2            Anesthesia Type: general    Vitals  Vitals Value Taken Time   /88 07/18/25 11:00   Temp 36.4 °C (97.6 °F) 07/18/25 11:00   Pulse 56 07/18/25 11:06   Resp 14 07/18/25 11:00   SpO2 98 % 07/18/25 11:06   Vitals shown include unfiled device data.        Post Anesthesia Care and Evaluation    Level of consciousness: awake and alert  Pain management: adequate    Airway patency: patent  Anesthetic complications: No anesthetic complications  PONV Status: controlled  Cardiovascular status: blood pressure returned to baseline and acceptable  Respiratory status: acceptable  Hydration status: acceptable

## 2025-07-18 NOTE — ANESTHESIA PREPROCEDURE EVALUATION
Anesthesia Evaluation     Patient summary reviewed and Nursing notes reviewed   NPO Solid Status: > 8 hours  NPO Liquid Status: > 2 hours           Airway   Mallampati: II  TM distance: >3 FB  Neck ROM: full  Dental      Pulmonary - negative pulmonary ROS   Cardiovascular     (+) DVT, hyperlipidemia  (-) past MI, CAD, dysrhythmias, angina, CHF, cardiac stents, CABG      Neuro/Psych  (+) numbness  GI/Hepatic/Renal/Endo    (+) GERD    Musculoskeletal     Abdominal    Substance History      OB/GYN          Other   arthritis,   history of cancer (h/o prostate ca)                Anesthesia Plan    ASA 2     general     intravenous induction     Anesthetic plan, risks, benefits, and alternatives have been provided, discussed and informed consent has been obtained with: patient.    CODE STATUS:

## 2025-07-18 NOTE — OP NOTE
Name: Ck Arreguin  YOB: 1960    DATE OF SURGERY: 7/18/2025    PREOPERATIVE DIAGNOSIS: Right hip end-stage osteoarthritis    POSTOPERATIVE DIAGNOSIS: Right hip end-stage osteoarthritis    PROCEDURE PERFORMED: Right anterior total hip replacement    SURGEON: Navarro Thomason M.D.    ASSISTANT: SHARYN DAVENPORT    A surgical assistant was integral in ensuring a successful outcome with this procedure.  The assistant was utilized to assist in positioning the patient, draping the patient, was used throughout the case to provide with retraction of tissues, suctioning of blood and body fluids for visualization, positioning of the extremity to allow for proper exposure so that I could perform the procedure.  Without the use of a surgical assistant during this procedure I feel that the outcome may have been compromised or would have been suboptimal or at risk for complications.    IMPLANTS: [employer] Polar stem, R3 cup:  Implant Name Type Inv. Item Serial No.  Lot No. LRB No. Used Action   DEV CONTRL TISS STRATAFIX SPIRAL MNCRYL UD 3/0 PLS 30CM - [id number] Implant DEV CONTRL TISS STRATAFIX SPIRAL MNCRYL UD 3/0 PLS 30CM  [employer]  DIV OF J AND J [id number] Right 1 Implanted   DEV WND/CLS CONTRL TISS STRATAFIX SPIRAL PDS PLS CT1 0 30CM - [id number] Implant DEV WND/CLS CONTRL TISS STRATAFIX SPIRAL PDS PLS CT1 0 30CM  [employer]  DIV OF J AND J [id number] Right 1 Implanted   LINER ACET R3 XLPE 0D [id number] - [id number] Implant LINER ACET R3 XLPE 0D [id number]  BEARD [employer]PHE[employer] [id number] Right 1 Implanted   SHLL ACET R3 3H STD 56MM - [id number] Implant SHLL ACET R3 3H STD 56MM  [employer] [id number] Right 1 Implanted   SCRW SPH HD REFLECTION 6.5X20MM - [id number] Implant SCRW SPH HD REFLECTION 6.5X20MM  BEARD [employer] NEPHEW [id number] Right 1 Implanted   SCRW SPH HD REFLECTION 6.5X20MM - [id number] Implant SCRW SPH HD REFLECTION 6.5X20MM  BEARD AND NEPHEW [id number] Right 1 Implanted   STEM  "COLAR HIP CATALYSTEM STD/OFFST SZ4 - [id number] Implant STEM COLAR HIP CATALYSTEM STD/OFFST SZ4  [employer] [id number] Right 1 Implanted   HD FEM/HIP OXINIUM TPR 12/14 36MM PLS0 - [id number] Implant HD FEM/HIP OXINIUM TPR 12/14 36MM PLS0  BEARD [employer] NEPHEW [id number] Right 1 Implanted       Estimated Blood Loss: 200cc  Specimens : none  Complications: none    DESCRIPTION OF PROCEDURE: The patient was taken to the operating room and placed in the supine position. A sequential compression device was carefully placed on the non-operative leg. Preoperative antibiotics were administered. Surgical time out was performed. After adequate induction of anesthesia, the feet were padded and placed in the [place] table boots. The patient ws then transferred onto the [place] table and positioned appropriately. C-arm image intensification was then used to take images of the pelvis and operative hip to be used for later comparison. The hip was then prepped and draped in the usual sterile fashion.   An incision was then made starting 2 cm lateral to the ASIS heading distal and lateral at approximately 30 degrees. The subcutaneous fat was then divided down to the fascia overlying the tensor fascia pieter (TFL) muscle. This was sharply divided staying a few cm lateral to the interval between the sartorius and the TFL muscle. This interval was then bluntly dissected. The circumflex vessels were then identified, cauterized, and divided. There was excellent hemostasis. Cobra retractors were then placed around the femoral neck capsule. The capsule was then divided using a \"T\" capsulotomy. The retractors were then placed intracapsular and the neck osteotomy was performed. A napkin ring neck fragment was then removed and then the head was removed using a corkscrew. There were end-stage arthritic findings.   The acetabulum was then exposed with \"number 7\" retractors. The labrum and pulvinar were excised. The starting reamer was then used to " medialize the cup and then the acetabular reaming proceeded in 2 mm increments. The cup was reamed line to line. The cup was then partially seated and c-arm was used to confirm the final cup position before final seating occurred. There was excellent position and stability of the cup.  The hip was then injected with anesthetic cocktail and the cup was anchored with 2 screws in the superior and posterior quadrants. The final liner was placed.   Attention was turned to the femur. Traction was removed from the hip and the leg was brought to the neutral position. The femoral elevator hook was placed. The leg was then moved to the external rotation, extension, and adduction position. Retractors were placed around the proximal femur and then the posterior capsule and conjoined tendon was then released. The box osteotome was then used to create the starting hole. The femur was then prepared using the rat tail broach, followed by the chili-pepper broach and then we progressively broached up the final broach which fit nicely with excellent rotational and axial stability. The hip was then reduced and c-arm images were taken which confirmed appropriate fit and position on the implants. There were no complicating factors noted, and fluoro images were taken which confirmed proper restoration of leg length and offset. The trial components were removed, the hip was copiously irrigated and the final implants were then seated. C-arm images again confirmed appropriate anatomy restoration without complicating factors noted. The final head was then placed on a clean dry taper and the hip reduced.   The hip was then copiously irrigated.  There was excellent hemostasis. We placed a one-eighth inch Hemovac drain. We closed the hip in multiple layers in standard fashion. Sterile dressings were applied. At the end of the case, the sponge and needle counts were reported as being correct. There were no known complications. The patient was  then transported to the recovery room.      Navarro Thomason M.D.  7/18/2025

## 2025-07-21 ENCOUNTER — TELEPHONE (OUTPATIENT)
Dept: PREOP | Facility: HOSPITAL | Age: 65
End: 2025-07-21
Payer: COMMERCIAL

## 2025-07-21 NOTE — TELEPHONE ENCOUNTER
Post op day 2: Phone Call     Discharge Instructions:    Ask patient about his or her discharge instructions:  Patient Confirmed Understanding    2.   What, if any, recommendations, teaching, or interventions did you provide?   Reinforced ice     Health status:    3.   Pain controlled?    Yes    4.   Recommended interventions:            No   If YES, comment Not Applicable    5.   Incision/dressing status:   Dry and intact    6.   BUDDY - Green light blinking?   Yes    7.   Difficulties urination?    No    8.   Last BM?  Date: yes  If no BM by day 3-recommend OTC suppository or fleets enema: Not Applicable    Medications:    9.   Reviewed Medications with Patient/Family/Caregiver?   Yes     10. Patient taking medications as prescribed?   Yes    11. If not taking medications as prescribed, note specific medicine(s) and reason for each:    Not Applicable    Hospital Follow Up Plan:    12. Follow up Appointment with Orthopedic surgeon:   Patient confirms follow up appointment    13. Home Care ordered at discharge?  Yes         14. Home Care started, or contact made?   Yes  If no, action taken: Not Applicable    15. DME obtained/used in home?    Yes    16. Using IS?   Not Applicable    17. Other information:    Not Applicable

## 2025-07-23 ENCOUNTER — TELEPHONE (OUTPATIENT)
Dept: ORTHOPEDIC SURGERY | Facility: CLINIC | Age: 65
End: 2025-07-23

## 2025-07-24 DIAGNOSIS — M16.11 PRIMARY OSTEOARTHRITIS OF RIGHT HIP: ICD-10-CM

## 2025-07-24 RX ORDER — HYDROCODONE BITARTRATE AND ACETAMINOPHEN 7.5; 325 MG/1; MG/1
1 TABLET ORAL
Qty: 30 TABLET | Refills: 0 | Status: SHIPPED | OUTPATIENT
Start: 2025-07-24

## 2025-07-28 RX ORDER — RIVAROXABAN 10 MG/1
10 TABLET, FILM COATED ORAL DAILY
Qty: 90 TABLET | Refills: 1 | Status: SHIPPED | OUTPATIENT
Start: 2025-07-28

## 2025-08-05 ENCOUNTER — OFFICE VISIT (OUTPATIENT)
Dept: ORTHOPEDIC SURGERY | Facility: CLINIC | Age: 65
End: 2025-08-05
Payer: COMMERCIAL

## 2025-08-05 VITALS — BODY MASS INDEX: 31.94 KG/M2 | WEIGHT: 235.8 LBS | TEMPERATURE: 98.2 F | HEIGHT: 72 IN

## 2025-08-05 DIAGNOSIS — R52 PAIN: Primary | ICD-10-CM

## 2025-08-05 PROCEDURE — 73502 X-RAY EXAM HIP UNI 2-3 VIEWS: CPT | Performed by: ORTHOPAEDIC SURGERY

## 2025-08-05 PROCEDURE — 99024 POSTOP FOLLOW-UP VISIT: CPT | Performed by: ORTHOPAEDIC SURGERY

## 2025-08-06 ENCOUNTER — TREATMENT (OUTPATIENT)
Dept: PHYSICAL THERAPY | Facility: CLINIC | Age: 65
End: 2025-08-06
Payer: COMMERCIAL

## 2025-08-06 DIAGNOSIS — Z96.641 STATUS POST TOTAL HIP REPLACEMENT, RIGHT: Primary | ICD-10-CM

## 2025-08-06 DIAGNOSIS — M25.551 RIGHT HIP PAIN: ICD-10-CM

## 2025-08-12 ENCOUNTER — TREATMENT (OUTPATIENT)
Dept: PHYSICAL THERAPY | Facility: CLINIC | Age: 65
End: 2025-08-12
Payer: COMMERCIAL

## 2025-08-12 DIAGNOSIS — Z96.641 STATUS POST TOTAL HIP REPLACEMENT, RIGHT: Primary | ICD-10-CM

## 2025-08-12 DIAGNOSIS — M25.551 RIGHT HIP PAIN: ICD-10-CM

## 2025-08-20 ENCOUNTER — TREATMENT (OUTPATIENT)
Dept: PHYSICAL THERAPY | Facility: CLINIC | Age: 65
End: 2025-08-20
Payer: COMMERCIAL

## 2025-08-20 DIAGNOSIS — M25.551 RIGHT HIP PAIN: ICD-10-CM

## 2025-08-20 DIAGNOSIS — Z96.641 STATUS POST TOTAL HIP REPLACEMENT, RIGHT: Primary | ICD-10-CM

## 2025-08-27 ENCOUNTER — TREATMENT (OUTPATIENT)
Dept: PHYSICAL THERAPY | Facility: CLINIC | Age: 65
End: 2025-08-27
Payer: COMMERCIAL

## 2025-08-27 DIAGNOSIS — Z96.641 STATUS POST TOTAL HIP REPLACEMENT, RIGHT: Primary | ICD-10-CM

## 2025-08-27 DIAGNOSIS — M25.551 RIGHT HIP PAIN: ICD-10-CM

## (undated) DEVICE — SYS SKIN EXOFIN WND CLS 4X22CM

## (undated) DEVICE — 3M™ IOBAN™ 2 ANTIMICROBIAL INCISE DRAPE 6640EZ: Brand: IOBAN™ 2

## (undated) DEVICE — GLV SURG SENSICARE PI MIC PF SZ7 LF STRL

## (undated) DEVICE — PATIENT RETURN ELECTRODE, SINGLE-USE, CONTACT QUALITY MONITORING, ADULT, WITH 9FT CORD, FOR PATIENTS WEIGING OVER 33LBS. (15KG): Brand: MEGADYNE

## (undated) DEVICE — APPL DURAPREP IODOPHOR APL 26ML

## (undated) DEVICE — SPONGE,LAP,18"X18",DLX,XR,ST,5/PK,40/PK: Brand: MEDLINE

## (undated) DEVICE — GLV SURG SENSICARE W/ALOE PF LF 7.5 STRL

## (undated) DEVICE — WEREWOLF FASTSEAL 6.0 HEMOSTASIS WAND: Brand: FASTSEAL 6.0 HEMOSTASIS WAND

## (undated) DEVICE — PREP SOL POVIDONE/IODINE BT 4OZ

## (undated) DEVICE — UNDERGLV SURG BIOGEL INDICATOR LF PF 7.5

## (undated) DEVICE — ANTIBACTERIAL UNDYED BRAIDED (POLYGLACTIN 910), SYNTHETIC ABSORBABLE SUTURE: Brand: COATED VICRYL

## (undated) DEVICE — PENCL SMOKE/EVAC MEGADYNE TELESCP 10FT

## (undated) DEVICE — SKIN PREP TRAY 4 COMPARTM TRAY: Brand: MEDLINE INDUSTRIES, INC.

## (undated) DEVICE — THE STERILE LIGHT HANDLE COVER IS USED WITH STERIS SURGICAL LIGHTING AND VISUALIZATION SYSTEMS.

## (undated) DEVICE — GLV SURG SENSICARE PI PF LF 7 GRN STRL

## (undated) DEVICE — DEV WND/CLS CONTRL TISS STRATAFIX SPIRAL PDS PLS CT1 0 30CM

## (undated) DEVICE — PK ANT HIP 40

## (undated) DEVICE — TRAP FLD MINIVAC MEGADYNE 100ML

## (undated) DEVICE — REFLECTION FLEXIBLE DRILL 25MM: Brand: REFLECTION

## (undated) DEVICE — GLV SURG BIOGEL M LTX PF 7 1/2

## (undated) DEVICE — GLV SURG SENSICARE W/ALOE PF LF 8 STRL

## (undated) DEVICE — NEEDLE, QUINCKE 22GX3.5": Brand: MEDLINE INDUSTRIES, INC.

## (undated) DEVICE — TOWEL,OR,DSP,ST,BLUE,STD,4/PK,20PK/CS: Brand: MEDLINE

## (undated) DEVICE — DECANTER BAG 9": Brand: MEDLINE INDUSTRIES, INC.

## (undated) DEVICE — 450 ML BOTTLE OF 0.05% CHLORHEXIDINE GLUCONATE IN 99.95% STERILE WATER FOR IRRIGATION, USP AND APPLICATOR.: Brand: IRRISEPT ANTIMICROBIAL WOUND LAVAGE

## (undated) DEVICE — SUT PDS 1 CT1 36IN Z347H

## (undated) DEVICE — DEV CONTRL TISS STRATAFIX SPIRAL MNCRYL UD 3/0 PLS 30CM

## (undated) DEVICE — STPLR SKIN VISISTAT WD 35CT

## (undated) DEVICE — DUAL CUT SAGITTAL BLADE

## (undated) DEVICE — SUT VIC PLS UD BR COAT ANTIB ABS CT1 SZ1 36MM 27IN VCPP40D